# Patient Record
Sex: MALE | Race: BLACK OR AFRICAN AMERICAN | Employment: UNEMPLOYED | URBAN - METROPOLITAN AREA
[De-identification: names, ages, dates, MRNs, and addresses within clinical notes are randomized per-mention and may not be internally consistent; named-entity substitution may affect disease eponyms.]

---

## 2017-12-15 ENCOUNTER — HOSPITAL ENCOUNTER (EMERGENCY)
Age: 32
Discharge: HOME | End: 2017-12-16
Attending: EMERGENCY MEDICINE
Payer: SELF-PAY

## 2017-12-15 VITALS
SYSTOLIC BLOOD PRESSURE: 121 MMHG | OXYGEN SATURATION: 100 % | RESPIRATION RATE: 16 BRPM | HEIGHT: 71 IN | WEIGHT: 170 LBS | TEMPERATURE: 97.6 F | BODY MASS INDEX: 23.8 KG/M2 | DIASTOLIC BLOOD PRESSURE: 74 MMHG | HEART RATE: 65 BPM
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
## 2017-12-15 DIAGNOSIS — M54.2 NECK PAIN: ICD-10-CM

## 2017-12-15 PROCEDURE — 99283 EMERGENCY DEPT VISIT LOW MDM: CPT

## 2017-12-16 PROCEDURE — 74011250637 HC RX REV CODE- 250/637: Performed by: EMERGENCY MEDICINE

## 2017-12-16 RX ORDER — DIAZEPAM 5 MG/1
5-10 TABLET ORAL
Qty: 15 TAB | Refills: 0 | Status: SHIPPED | OUTPATIENT
Start: 2017-12-16 | End: 2019-03-11 | Stop reason: ALTCHOICE

## 2017-12-16 RX ORDER — IBUPROFEN 600 MG/1
600 TABLET ORAL
Qty: 30 TAB | Refills: 0 | Status: SHIPPED | OUTPATIENT
Start: 2017-12-16 | End: 2019-03-11 | Stop reason: ALTCHOICE

## 2017-12-16 RX ORDER — IBUPROFEN 600 MG/1
600 TABLET ORAL
Status: COMPLETED | OUTPATIENT
Start: 2017-12-16 | End: 2017-12-16

## 2017-12-16 RX ADMIN — IBUPROFEN 600 MG: 600 TABLET, FILM COATED ORAL at 00:43
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
## 2019-03-11 ENCOUNTER — APPOINTMENT (OUTPATIENT)
Dept: CT IMAGING | Age: 34
DRG: 043 | End: 2019-03-11
Attending: EMERGENCY MEDICINE
Payer: MEDICAID

## 2019-03-11 ENCOUNTER — HOSPITAL ENCOUNTER (INPATIENT)
Age: 34
LOS: 5 days | Discharge: HOME HEALTH | DRG: 043 | End: 2019-03-16
Attending: EMERGENCY MEDICINE | Admitting: FAMILY MEDICINE
Payer: MEDICAID

## 2019-03-11 DIAGNOSIS — R27.0 ATAXIA: Primary | ICD-10-CM

## 2019-03-11 PROBLEM — G45.9 TIA (TRANSIENT ISCHEMIC ATTACK): Status: ACTIVE | Noted: 2019-03-11

## 2019-03-11 LAB
ALBUMIN SERPL-MCNC: 4.3 G/DL (ref 3.4–5)
ALBUMIN/GLOB SERPL: 1.4 {RATIO} (ref 0.8–1.7)
ALP SERPL-CCNC: 58 U/L (ref 45–117)
ALT SERPL-CCNC: 30 U/L (ref 16–61)
ANION GAP SERPL CALC-SCNC: 3 MMOL/L (ref 3–18)
AST SERPL-CCNC: 13 U/L (ref 15–37)
BASOPHILS # BLD: 0 K/UL (ref 0–0.1)
BASOPHILS NFR BLD: 0 % (ref 0–2)
BILIRUB SERPL-MCNC: 0.5 MG/DL (ref 0.2–1)
BUN SERPL-MCNC: 13 MG/DL (ref 7–18)
BUN/CREAT SERPL: 13 (ref 12–20)
CALCIUM SERPL-MCNC: 9 MG/DL (ref 8.5–10.1)
CHLORIDE SERPL-SCNC: 105 MMOL/L (ref 100–108)
CO2 SERPL-SCNC: 32 MMOL/L (ref 21–32)
CREAT SERPL-MCNC: 1.02 MG/DL (ref 0.6–1.3)
DIFFERENTIAL METHOD BLD: ABNORMAL
EOSINOPHIL # BLD: 0 K/UL (ref 0–0.4)
EOSINOPHIL NFR BLD: 0 % (ref 0–5)
ERYTHROCYTE [DISTWIDTH] IN BLOOD BY AUTOMATED COUNT: 13.5 % (ref 11.6–14.5)
GLOBULIN SER CALC-MCNC: 3.1 G/DL (ref 2–4)
GLUCOSE SERPL-MCNC: 87 MG/DL (ref 74–99)
HCT VFR BLD AUTO: 38.4 % (ref 36–48)
HGB BLD-MCNC: 12.8 G/DL (ref 13–16)
LYMPHOCYTES # BLD: 0.9 K/UL (ref 0.9–3.6)
LYMPHOCYTES NFR BLD: 14 % (ref 21–52)
MAGNESIUM SERPL-MCNC: 2.4 MG/DL (ref 1.6–2.6)
MCH RBC QN AUTO: 30.5 PG (ref 24–34)
MCHC RBC AUTO-ENTMCNC: 33.3 G/DL (ref 31–37)
MCV RBC AUTO: 91.4 FL (ref 74–97)
MONOCYTES # BLD: 0.6 K/UL (ref 0.05–1.2)
MONOCYTES NFR BLD: 9 % (ref 3–10)
NEUTS SEG # BLD: 5.3 K/UL (ref 1.8–8)
NEUTS SEG NFR BLD: 77 % (ref 40–73)
PLATELET # BLD AUTO: 223 K/UL (ref 135–420)
PMV BLD AUTO: 9.4 FL (ref 9.2–11.8)
POTASSIUM SERPL-SCNC: 4.4 MMOL/L (ref 3.5–5.5)
PROT SERPL-MCNC: 7.4 G/DL (ref 6.4–8.2)
RBC # BLD AUTO: 4.2 M/UL (ref 4.7–5.5)
SODIUM SERPL-SCNC: 140 MMOL/L (ref 136–145)
TSH SERPL DL<=0.05 MIU/L-ACNC: 1.2 UIU/ML (ref 0.36–3.74)
VIT B12 SERPL-MCNC: 450 PG/ML (ref 211–911)
WBC # BLD AUTO: 6.9 K/UL (ref 4.6–13.2)

## 2019-03-11 PROCEDURE — 83036 HEMOGLOBIN GLYCOSYLATED A1C: CPT

## 2019-03-11 PROCEDURE — 93005 ELECTROCARDIOGRAM TRACING: CPT

## 2019-03-11 PROCEDURE — 74011250636 HC RX REV CODE- 250/636: Performed by: EMERGENCY MEDICINE

## 2019-03-11 PROCEDURE — 84481 FREE ASSAY (FT-3): CPT

## 2019-03-11 PROCEDURE — 84439 ASSAY OF FREE THYROXINE: CPT

## 2019-03-11 PROCEDURE — 99285 EMERGENCY DEPT VISIT HI MDM: CPT

## 2019-03-11 PROCEDURE — 80053 COMPREHEN METABOLIC PANEL: CPT

## 2019-03-11 PROCEDURE — 96360 HYDRATION IV INFUSION INIT: CPT

## 2019-03-11 PROCEDURE — 70450 CT HEAD/BRAIN W/O DYE: CPT

## 2019-03-11 PROCEDURE — 86038 ANTINUCLEAR ANTIBODIES: CPT

## 2019-03-11 PROCEDURE — 83735 ASSAY OF MAGNESIUM: CPT

## 2019-03-11 PROCEDURE — 84443 ASSAY THYROID STIM HORMONE: CPT

## 2019-03-11 PROCEDURE — 80061 LIPID PANEL: CPT

## 2019-03-11 PROCEDURE — 86141 C-REACTIVE PROTEIN HS: CPT

## 2019-03-11 PROCEDURE — 65270000029 HC RM PRIVATE

## 2019-03-11 PROCEDURE — 82607 VITAMIN B-12: CPT

## 2019-03-11 PROCEDURE — 85652 RBC SED RATE AUTOMATED: CPT

## 2019-03-11 PROCEDURE — 85025 COMPLETE CBC W/AUTO DIFF WBC: CPT

## 2019-03-11 RX ADMIN — SODIUM CHLORIDE 1000 ML: 900 INJECTION, SOLUTION INTRAVENOUS at 18:42

## 2019-03-11 NOTE — ED TRIAGE NOTES
Patient states he has had dizziness and lightheadedness for the past week, was seen at another hospital and given medication but has had no relief from the dizziness

## 2019-03-11 NOTE — ED PROVIDER NOTES
Soraida Gonzalez is a 26 y.o. Male with c/o continued intermittent dizziness, lightheadedness and occ off balance sensation for last 2-3 weeks. No syncope, head injury, cp, sob, edema, headaches, tinnitus, diarrhea, blood in stool. Was seen at clinic locally and rx meds which he cannot remember and states they are not working. No fcs. Sx worse with standing after sitting. Denies alcohol drugs, prior cardiac issues or other sig medical issues      The history is provided by the patient and medical records. History reviewed. No pertinent past medical history. History reviewed. No pertinent surgical history. History reviewed. No pertinent family history. Social History     Socioeconomic History    Marital status: SINGLE     Spouse name: Not on file    Number of children: Not on file    Years of education: Not on file    Highest education level: Not on file   Social Needs    Financial resource strain: Not on file    Food insecurity - worry: Not on file    Food insecurity - inability: Not on file    Transportation needs - medical: Not on file   [place] needs - non-medical: Not on file   Occupational History    Not on file   Tobacco Use    Smoking status: Never Smoker    Smokeless tobacco: Never Used   Substance and Sexual Activity    Alcohol use: Yes     Comment: occ    Drug use: No    Sexual activity: Not on file   Other Topics Concern    Not on file   Social History Narrative    Not on file         ALLERGIES: Patient has no known allergies. Review of Systems   Constitutional: Negative for activity change, appetite change, diaphoresis and fever. HENT: Negative for ear pain, sinus pressure, sore throat and tinnitus. Eyes: Negative for visual disturbance. Respiratory: Negative for shortness of breath. Cardiovascular: Negative for chest pain and palpitations. Gastrointestinal: Negative for abdominal pain. Endocrine: Negative for polyuria.    Genitourinary: Negative for difficulty urinating. Musculoskeletal: Negative for gait problem. Skin: Negative for rash. Allergic/Immunologic: Negative for immunocompromised state. Neurological: Positive for dizziness and light-headedness. Negative for seizures, syncope, speech difficulty, weakness and headaches. Psychiatric/Behavioral: Negative for sleep disturbance. Vitals:    03/11/19 1837   BP: 132/82   Pulse: 80   Resp: 16   Temp: 99.2 °F (37.3 °C)   SpO2: 100%   Weight: 65.8 kg (145 lb)   Height: 5' 7\" (1.702 m)            Physical Exam   Constitutional: He is oriented to person, place, and time. Non-toxic appearance. He does not have a sickly appearance. He does not appear ill. No distress. HENT:   Head: Normocephalic and atraumatic. Right Ear: External ear normal.   Left Ear: External ear normal.   Nose: Nose normal.   Mouth/Throat: Oropharynx is clear and moist. No oropharyngeal exudate. Eyes: Conjunctivae and EOM are normal. Pupils are equal, round, and reactive to light. No scleral icterus. Neck: Normal range of motion. Cardiovascular: Normal rate, regular rhythm, normal heart sounds and intact distal pulses. Pulmonary/Chest: Effort normal and breath sounds normal. No respiratory distress. Abdominal: Soft. There is no tenderness. Musculoskeletal: Normal range of motion. He exhibits no edema. Neurological: He is alert and oriented to person, place, and time. He displays no tremor. No cranial nerve deficit (3-12) or sensory deficit. He exhibits normal muscle tone. He displays no seizure activity. Coordination and gait abnormal.   Skin: Skin is warm and dry. Capillary refill takes less than 2 seconds. He is not diaphoretic. Psychiatric: His behavior is normal.   Nursing note and vitals reviewed.        [facility]       Procedures    Vitals:  Patient Vitals for the past 12 hrs:   Temp Pulse Resp BP SpO2   03/11/19 1837 99.2 °F (37.3 °C) 80 16 132/82 100 %         Medications ordered:   Medications   sodium chloride 0.9 % bolus infusion 1,000 mL (1,000 mL IntraVENous New Bag 3/11/19 2370)         Lab findings:  Recent Results (from the past 12 hour(s))   EKG, 12 LEAD, INITIAL    Collection Time: 03/11/19  7:36 PM   Result Value Ref Range    Ventricular Rate 69 BPM    Atrial Rate 69 BPM    P-R Interval 164 ms    QRS Duration 94 ms    Q-T Interval 356 ms    QTC Calculation (Bezet) 381 ms    Calculated P Axis 56 degrees    Calculated R Axis 74 degrees    Calculated T Axis 55 degrees    Diagnosis       Normal sinus rhythm  Early repolarization  Normal ECG  No previous ECGs available     METABOLIC PANEL, COMPREHENSIVE    Collection Time: 03/11/19  7:46 PM   Result Value Ref Range    Sodium 140 136 - 145 mmol/L    Potassium 4.4 3.5 - 5.5 mmol/L    Chloride 105 100 - 108 mmol/L    CO2 32 21 - 32 mmol/L    Anion gap 3 3.0 - 18 mmol/L    Glucose 87 74 - 99 mg/dL    BUN 13 7.0 - 18 MG/DL    Creatinine 1.02 0.6 - 1.3 MG/DL    BUN/Creatinine ratio 13 12 - 20      GFR est AA >60 >60 ml/min/1.73m2    GFR est non-AA >60 >60 ml/min/1.73m2    Calcium 9.0 8.5 - 10.1 MG/DL    Bilirubin, total 0.5 0.2 - 1.0 MG/DL    ALT (SGPT) 30 16 - 61 U/L    AST (SGOT) 13 (L) 15 - 37 U/L    Alk. phosphatase 58 45 - 117 U/L    Protein, total 7.4 6.4 - 8.2 g/dL    Albumin 4.3 3.4 - 5.0 g/dL    Globulin 3.1 2.0 - 4.0 g/dL    A-G Ratio 1.4 0.8 - 1.7     CBC WITH AUTOMATED DIFF    Collection Time: 03/11/19  7:46 PM   Result Value Ref Range    WBC 6.9 4.6 - 13.2 K/uL    RBC 4.20 (L) 4.70 - 5.50 M/uL    HGB 12.8 (L) 13.0 - 16.0 g/dL    HCT 38.4 36.0 - 48.0 %    MCV 91.4 74.0 - 97.0 FL    MCH 30.5 24.0 - 34.0 PG    MCHC 33.3 31.0 - 37.0 g/dL    RDW 13.5 11.6 - 14.5 %    PLATELET 853 109 - 638 K/uL    MPV 9.4 9.2 - 11.8 FL    NEUTROPHILS 77 (H) 40 - 73 %    LYMPHOCYTES 14 (L) 21 - 52 %    MONOCYTES 9 3 - 10 %    EOSINOPHILS 0 0 - 5 %    BASOPHILS 0 0 - 2 %    ABS. NEUTROPHILS 5.3 1.8 - 8.0 K/UL    ABS. LYMPHOCYTES 0.9 0.9 - 3.6 K/UL    ABS.  MONOCYTES 0.6 0.05 - 1.2 K/UL    ABS. EOSINOPHILS 0.0 0.0 - 0.4 K/UL    ABS. BASOPHILS 0.0 0.0 - 0.1 K/UL    DF AUTOMATED     MAGNESIUM    Collection Time: 03/11/19  7:46 PM   Result Value Ref Range    Magnesium 2.4 1.6 - 2.6 mg/dL       EKG interpretation by ED Physician:  Nsr. Early repol. No acute st tw changes indicative of ischemia  Rate 69, pr 164, qtc 381  No previous      X-Ray, CT or other radiology findings or impressions:  CT HEAD WO CONT    (Results Pending)   Preliminary  read as no acute intracranial abnormality. There are multiple areas of encephalomalacia in the corona radiata of the right frontal and left frontal parietal lobes likely chronic infarcts. No prior available for comparison. Progress notes, Consult notes or additional Procedure notes:   Seen by tele-neurologist.  Patient is very ataxic upon exam.  He recommends admission with MRI in the morning along with a B12 TSH and MOO levels drawn. I have discussed with the patient and his significant other at bedside regarding need for admission and is agreeable  I have consulted Dr. Troy Mora who is on-call for hospitalist and will admit. Reevaluation of patient:   Stable       Disposition:  Diagnosis:   1.  Ataxia        Disposition: admit    Follow-up Information    None              Medication List      You have not been prescribed any medications.     '

## 2019-03-12 ENCOUNTER — APPOINTMENT (OUTPATIENT)
Dept: MRI IMAGING | Age: 34
DRG: 043 | End: 2019-03-12
Attending: FAMILY MEDICINE
Payer: MEDICAID

## 2019-03-12 ENCOUNTER — APPOINTMENT (OUTPATIENT)
Dept: VASCULAR SURGERY | Age: 34
DRG: 043 | End: 2019-03-12
Attending: NURSE PRACTITIONER
Payer: MEDICAID

## 2019-03-12 ENCOUNTER — APPOINTMENT (OUTPATIENT)
Dept: MRI IMAGING | Age: 34
DRG: 043 | End: 2019-03-12
Attending: NURSE PRACTITIONER
Payer: MEDICAID

## 2019-03-12 ENCOUNTER — APPOINTMENT (OUTPATIENT)
Dept: NON INVASIVE DIAGNOSTICS | Age: 34
DRG: 043 | End: 2019-03-12
Attending: FAMILY MEDICINE
Payer: MEDICAID

## 2019-03-12 PROBLEM — G37.9 DEMYELINATING DISEASE OF CENTRAL NERVOUS SYSTEM (HCC): Status: ACTIVE | Noted: 2019-03-12

## 2019-03-12 LAB
APTT PPP: 27.8 SEC (ref 23–36.4)
ATRIAL RATE: 69 BPM
CALCULATED P AXIS, ECG09: 56 DEGREES
CALCULATED R AXIS, ECG10: 74 DEGREES
CALCULATED T AXIS, ECG11: 55 DEGREES
CHOLEST SERPL-MCNC: 167 MG/DL
DIAGNOSIS, 93000: NORMAL
ERYTHROCYTE [SEDIMENTATION RATE] IN BLOOD: 1 MM/HR (ref 0–15)
EST. AVERAGE GLUCOSE BLD GHB EST-MCNC: NORMAL MG/DL
GLUCOSE BLD STRIP.AUTO-MCNC: 118 MG/DL (ref 70–110)
GLUCOSE BLD STRIP.AUTO-MCNC: 140 MG/DL (ref 70–110)
GLUCOSE BLD STRIP.AUTO-MCNC: 61 MG/DL (ref 70–110)
GLUCOSE BLD STRIP.AUTO-MCNC: 64 MG/DL (ref 70–110)
GLUCOSE BLD STRIP.AUTO-MCNC: 66 MG/DL (ref 70–110)
GLUCOSE BLD STRIP.AUTO-MCNC: 74 MG/DL (ref 70–110)
GLUCOSE BLD STRIP.AUTO-MCNC: 92 MG/DL (ref 70–110)
GLUCOSE BLD STRIP.AUTO-MCNC: 97 MG/DL (ref 70–110)
HBA1C MFR BLD: 4.5 % (ref 4.2–5.6)
HDLC SERPL-MCNC: 56 MG/DL (ref 40–60)
HDLC SERPL: 3 {RATIO} (ref 0–5)
INR PPP: 1 (ref 0.8–1.2)
LDLC SERPL CALC-MCNC: 99 MG/DL (ref 0–100)
LEFT CCA DIST DIAS: 24.6 CM/S
LEFT CCA DIST SYS: 123.3 CM/S
LEFT CCA MID DIAS: 21.2 CM/S
LEFT CCA MID SYS: 140.3 CM/S
LEFT CCA PROX DIAS: 23.8 CM/S
LEFT CCA PROX SYS: 153.2 CM/S
LEFT ECA DIAS: 12.5 CM/S
LEFT ECA SYS: 93.6 CM/S
LEFT ICA DIST DIAS: 35.1 CM/S
LEFT ICA DIST SYS: 89.9 CM/S
LEFT ICA MID DIAS: 27.8 CM/S
LEFT ICA MID SYS: 86.2 CM/S
LEFT ICA PROX DIAS: 20.5 CM/S
LEFT ICA PROX SYS: 82.6 CM/S
LEFT ICA/CCA SYS: 0.6
LEFT SUBCLAVIAN DIAS: 0 CM/S
LEFT SUBCLAVIAN SYS: 153.3 CM/S
LEFT VERTEBRAL DIAS: 17.4 CM/S
LEFT VERTEBRAL SYS: 67.8 CM/S
LIPID PROFILE,FLP: NORMAL
P-R INTERVAL, ECG05: 164 MS
PROTHROMBIN TIME: 12.5 SEC (ref 11.5–15.2)
Q-T INTERVAL, ECG07: 356 MS
QRS DURATION, ECG06: 94 MS
QTC CALCULATION (BEZET), ECG08: 381 MS
RIGHT CCA DIST DIAS: 19.5 CM/S
RIGHT CCA DIST SYS: 135.8 CM/S
RIGHT CCA MID DIAS: 17.3 CM/S
RIGHT CCA MID SYS: 116.1 CM/S
RIGHT CCA PROX DIAS: 26.1 CM/S
RIGHT CCA PROX SYS: 168.8 CM/S
RIGHT ECA DIAS: 16.3 CM/S
RIGHT ECA SYS: 114.6 CM/S
RIGHT ICA DIST DIAS: 26.5 CM/S
RIGHT ICA DIST SYS: 76.9 CM/S
RIGHT ICA MID DIAS: 26.5 CM/S
RIGHT ICA MID SYS: 92.8 CM/S
RIGHT ICA PROX DIAS: 21.6 CM/S
RIGHT ICA PROX SYS: 81.8 CM/S
RIGHT ICA/CCA SYS: 0.6
RIGHT SUBCLAVIAN DIAS: 0 CM/S
RIGHT SUBCLAVIAN SYS: 143 CM/S
RIGHT VERTEBRAL DIAS: 13.4 CM/S
RIGHT VERTEBRAL SYS: 66.2 CM/S
T3FREE SERPL-MCNC: 2.4 PG/ML (ref 2.18–3.98)
T4 FREE SERPL-MCNC: 0.8 NG/DL (ref 0.7–1.5)
TRIGL SERPL-MCNC: 60 MG/DL (ref ?–150)
TSH SERPL DL<=0.05 MIU/L-ACNC: 0.91 UIU/ML (ref 0.36–3.74)
VENTRICULAR RATE, ECG03: 69 BPM
VLDLC SERPL CALC-MCNC: 12 MG/DL

## 2019-03-12 PROCEDURE — 85610 PROTHROMBIN TIME: CPT

## 2019-03-12 PROCEDURE — 97116 GAIT TRAINING THERAPY: CPT

## 2019-03-12 PROCEDURE — 82164 ANGIOTENSIN I ENZYME TEST: CPT

## 2019-03-12 PROCEDURE — 65660000000 HC RM CCU STEPDOWN

## 2019-03-12 PROCEDURE — 97165 OT EVAL LOW COMPLEX 30 MIN: CPT

## 2019-03-12 PROCEDURE — 86235 NUCLEAR ANTIGEN ANTIBODY: CPT

## 2019-03-12 PROCEDURE — 97162 PT EVAL MOD COMPLEX 30 MIN: CPT

## 2019-03-12 PROCEDURE — 86618 LYME DISEASE ANTIBODY: CPT

## 2019-03-12 PROCEDURE — 86780 TREPONEMA PALLIDUM: CPT

## 2019-03-12 PROCEDURE — 87389 HIV-1 AG W/HIV-1&-2 AB AG IA: CPT

## 2019-03-12 PROCEDURE — 77030032490 HC SLV COMPR SCD KNE COVD -B

## 2019-03-12 PROCEDURE — 72156 MRI NECK SPINE W/O & W/DYE: CPT

## 2019-03-12 PROCEDURE — 74011000258 HC RX REV CODE- 258: Performed by: HOSPITALIST

## 2019-03-12 PROCEDURE — 74011250636 HC RX REV CODE- 250/636: Performed by: FAMILY MEDICINE

## 2019-03-12 PROCEDURE — 84425 ASSAY OF VITAMIN B-1: CPT

## 2019-03-12 PROCEDURE — 74011000250 HC RX REV CODE- 250: Performed by: FAMILY MEDICINE

## 2019-03-12 PROCEDURE — 74011000258 HC RX REV CODE- 258: Performed by: FAMILY MEDICINE

## 2019-03-12 PROCEDURE — 74011636320 HC RX REV CODE- 636/320: Performed by: HOSPITALIST

## 2019-03-12 PROCEDURE — 82962 GLUCOSE BLOOD TEST: CPT

## 2019-03-12 PROCEDURE — A9575 INJ GADOTERATE MEGLUMI 0.1ML: HCPCS | Performed by: HOSPITALIST

## 2019-03-12 PROCEDURE — 92610 EVALUATE SWALLOWING FUNCTION: CPT

## 2019-03-12 PROCEDURE — 74011250636 HC RX REV CODE- 250/636: Performed by: PSYCHIATRY & NEUROLOGY

## 2019-03-12 PROCEDURE — 93880 EXTRACRANIAL BILAT STUDY: CPT

## 2019-03-12 PROCEDURE — 82525 ASSAY OF COPPER: CPT

## 2019-03-12 PROCEDURE — 36415 COLL VENOUS BLD VENIPUNCTURE: CPT

## 2019-03-12 PROCEDURE — 74011250637 HC RX REV CODE- 250/637: Performed by: FAMILY MEDICINE

## 2019-03-12 PROCEDURE — 70553 MRI BRAIN STEM W/O & W/DYE: CPT

## 2019-03-12 PROCEDURE — 82175 ASSAY OF ARSENIC: CPT

## 2019-03-12 PROCEDURE — 85730 THROMBOPLASTIN TIME PARTIAL: CPT

## 2019-03-12 PROCEDURE — 72157 MRI CHEST SPINE W/O & W/DYE: CPT

## 2019-03-12 RX ORDER — AMOXICILLIN 250 MG
2 CAPSULE ORAL
Status: DISCONTINUED | OUTPATIENT
Start: 2019-03-12 | End: 2019-03-16 | Stop reason: HOSPADM

## 2019-03-12 RX ORDER — PANTOPRAZOLE SODIUM 40 MG/1
40 TABLET, DELAYED RELEASE ORAL EVERY 12 HOURS
Status: DISCONTINUED | OUTPATIENT
Start: 2019-03-12 | End: 2019-03-12

## 2019-03-12 RX ORDER — DEXTROSE MONOHYDRATE AND SODIUM CHLORIDE 5; .9 G/100ML; G/100ML
0.75 INJECTION, SOLUTION INTRAVENOUS CONTINUOUS
Status: DISCONTINUED | OUTPATIENT
Start: 2019-03-12 | End: 2019-03-14

## 2019-03-12 RX ORDER — ACETAMINOPHEN 650 MG/1
650 SUPPOSITORY RECTAL
Status: DISCONTINUED | OUTPATIENT
Start: 2019-03-12 | End: 2019-03-16 | Stop reason: HOSPADM

## 2019-03-12 RX ORDER — ATORVASTATIN CALCIUM 40 MG/1
80 TABLET, FILM COATED ORAL
Status: DISCONTINUED | OUTPATIENT
Start: 2019-03-12 | End: 2019-03-13

## 2019-03-12 RX ORDER — ALBUTEROL SULFATE 0.83 MG/ML
2.5 SOLUTION RESPIRATORY (INHALATION)
Status: DISCONTINUED | OUTPATIENT
Start: 2019-03-12 | End: 2019-03-16 | Stop reason: HOSPADM

## 2019-03-12 RX ORDER — PANTOPRAZOLE SODIUM 40 MG/1
40 GRANULE, DELAYED RELEASE ORAL EVERY 12 HOURS
Status: DISCONTINUED | OUTPATIENT
Start: 2019-03-12 | End: 2019-03-12

## 2019-03-12 RX ORDER — ONDANSETRON 2 MG/ML
2 INJECTION INTRAMUSCULAR; INTRAVENOUS
Status: DISCONTINUED | OUTPATIENT
Start: 2019-03-12 | End: 2019-03-16 | Stop reason: HOSPADM

## 2019-03-12 RX ORDER — DEXTROSE MONOHYDRATE AND SODIUM CHLORIDE 5; .45 G/100ML; G/100ML
75 INJECTION, SOLUTION INTRAVENOUS CONTINUOUS
Status: DISCONTINUED | OUTPATIENT
Start: 2019-03-12 | End: 2019-03-16

## 2019-03-12 RX ORDER — ASPIRIN 325 MG
325 TABLET ORAL DAILY
Status: DISCONTINUED | OUTPATIENT
Start: 2019-03-12 | End: 2019-03-13

## 2019-03-12 RX ORDER — ACETAMINOPHEN 325 MG/1
650 TABLET ORAL
Status: DISCONTINUED | OUTPATIENT
Start: 2019-03-12 | End: 2019-03-16 | Stop reason: HOSPADM

## 2019-03-12 RX ORDER — PANTOPRAZOLE SODIUM 40 MG/1
40 TABLET, DELAYED RELEASE ORAL EVERY 12 HOURS
Status: DISCONTINUED | OUTPATIENT
Start: 2019-03-12 | End: 2019-03-16 | Stop reason: HOSPADM

## 2019-03-12 RX ORDER — ONDANSETRON 2 MG/ML
4 INJECTION INTRAMUSCULAR; INTRAVENOUS
Status: DISCONTINUED | OUTPATIENT
Start: 2019-03-12 | End: 2019-03-12

## 2019-03-12 RX ADMIN — ASPIRIN 325 MG ORAL TABLET 325 MG: 325 PILL ORAL at 09:16

## 2019-03-12 RX ADMIN — FOLIC ACID: 5 INJECTION, SOLUTION INTRAMUSCULAR; INTRAVENOUS; SUBCUTANEOUS at 17:39

## 2019-03-12 RX ADMIN — PANTOPRAZOLE SODIUM 40 MG: 40 TABLET, DELAYED RELEASE ORAL at 09:16

## 2019-03-12 RX ADMIN — ATORVASTATIN CALCIUM 80 MG: 40 TABLET, FILM COATED ORAL at 22:15

## 2019-03-12 RX ADMIN — PANTOPRAZOLE SODIUM 40 MG: 40 TABLET, DELAYED RELEASE ORAL at 01:32

## 2019-03-12 RX ADMIN — DEXTROSE MONOHYDRATE AND SODIUM CHLORIDE 75 ML/HR: 5; .45 INJECTION, SOLUTION INTRAVENOUS at 09:06

## 2019-03-12 RX ADMIN — PANTOPRAZOLE SODIUM 40 MG: 40 TABLET, DELAYED RELEASE ORAL at 22:16

## 2019-03-12 RX ADMIN — GADOTERATE MEGLUMINE 15 ML: 376.9 INJECTION INTRAVENOUS at 16:35

## 2019-03-12 RX ADMIN — DEXTROSE MONOHYDRATE AND SODIUM CHLORIDE 0.75 ML/KG/HR: 5; .9 INJECTION, SOLUTION INTRAVENOUS at 01:30

## 2019-03-12 RX ADMIN — SODIUM CHLORIDE 1000 MG: 900 INJECTION, SOLUTION INTRAVENOUS at 22:59

## 2019-03-12 RX ADMIN — SENNOSIDES AND DOCUSATE SODIUM 2 TABLET: 8.6; 5 TABLET ORAL at 01:30

## 2019-03-12 RX ADMIN — ATORVASTATIN CALCIUM 80 MG: 40 TABLET, FILM COATED ORAL at 01:30

## 2019-03-12 NOTE — PROGRESS NOTES
Problem: Falls - Risk of  Goal: *Absence of Falls  Document Basim Fall Risk and appropriate interventions in the flowsheet.   Outcome: Progressing Towards Goal  Fall Risk Interventions:                      History of Falls Interventions: Room close to nurse's station, Investigate reason for fall, Door open when patient unattended

## 2019-03-12 NOTE — ROUTINE PROCESS
Assume care of patient from [address]. Patient received in bed awake. Patient A&Ox4, denies pain and discomfort. No distress noted. Wound care completed on sacrum. Ileostomy emptied. Incontinent care completed. Prevolon boots present bilateral. SCD's in place bilateral. Was informed that patient will be transferred to SNF tomorrow. Frequently use items within reach. Bed locked in low position. Call bell within reach and patient verbalized understanding of use for assistance and needs.

## 2019-03-12 NOTE — PROGRESS NOTES
Problem: Dysphagia (Adult)  Goal: *Acute Goals and Plan of Care (Insert Text)  Patient will:  1. Participate in training and education related to continued aspiration risk, diet recs and compensatory strategies (goal met). Outcome: Resolved/Met Date Met: 03/12/19  Speech LAnguage Pathology bedside swallow evaluation AND DISCHARGE    Patient: Soraida Gonzalez (1 y.o. male)  Date: 3/12/2019  Primary Diagnosis: TIA (transient ischemic attack) [G45.9]  Ataxia [R27.0]  Ataxia [R27.0]  TIA (transient ischemic attack) [G45.9]       Precautions: N/A     PLOF: Independent  ASSESSMENT :  Clinical beside swallow eval completed per MD orders. Pt A&Ox4. Functional communication. Intelligibility >90%. Cognitive-linguistic function appears intact. OM examination revealed oral motor structures functional for mastication and deglutition. Presented with thin liquid, puree, and solid trials. Exhibited adequate bolus cohesion, manipulation and A-P transit. Further exhibited adequate swallow timing/reflex and hyolaryngeal excursion. Pt able to manipulate and clear with 0 clinical s/s aspiration and/or oropharyngeal dysphagia. Pt safe for regular solid, thin liquid diet. 0 formal ST needs for dysphagia indicated at this time. SLP educated pt on role of speech therapist in current setting with re: speech/swallow; verbalized comprehension. SLP available for re-evaluation if indicated by MD. Results d/w RN, Sameer Tim. Thank you for this referral.   Sky Anaya, SLP     PLAN :  Recommendations and Planned Interventions:  No formal ST needs ID'd for dysphagia. Eval only. Discharge Recommendations: None for ST     SUBJECTIVE:   Patient stated OK. OBJECTIVE:   History reviewed. No pertinent past medical history. History reviewed. No pertinent surgical history.   Prior Level of Function/Home Situation: Independent  Home Situation  Home Environment: Apartment  # Steps to Enter: 0  One/Two Story Residence: One story  Living Alone: No  Support Systems: Friends \ neighbors  Patient Expects to be Discharged to[de-identified] Apartment  Current DME Used/Available at Home: None  Diet prior to admission: regular/thin  Current Diet:  Regular/thin   Cognitive and Communication Status:  Neurologic State: Alert  Orientation Level: Oriented X4  Cognition: Follows commands  Perception: Appears intact  Perseveration: No perseveration noted  Safety/Judgement: Awareness of environment  Oral Assessment:  Oral Assessment  Labial: No impairment  Dentition: Limited;Natural  Oral Hygiene: good  Lingual: No impairment  Velum: No impairment  Mandible: No impairment  P.O. Trials:  Patient Position: [facility] 80  Vocal quality prior to P.O.: No impairment  Consistency Presented: Thin liquid; Solid  How Presented: Self-fed/presented; Successive swallows;Straw     Bolus Acceptance: No impairment  Bolus Formation/Control: No impairment     Propulsion: No impairment  Oral Residue: None  Initiation of Swallow: No impairment  Laryngeal Elevation: Functional  Aspiration Signs/Symptoms: None  Pharyngeal Phase Characteristics: No impairment, issues, or problems   Effective Modifications: None  Cues for Modifications: None       Oral Phase Severity: No impairment  Pharyngeal Phase Severity : No impairment    The severity rating is based on the following outcomes:  PRETTY Noms Swallow Level 7        PAIN:  Start of Eval: 0  End of Eval: 0     After evaluation:   []            Patient left in no apparent distress sitting up in chair  [x]            Patient left in no apparent distress in bed  [x]            Call bell left within reach  [x]            Nursing notified  []            Family present  []            Caregiver present  []            Bed alarm activated      COMMUNICATION/EDUCATION:   [x]            Aspiration precautions; swallow safety; compensatory techniques. []            Patient/family have participated as able in goal setting and plan of care.   []            Patient/family agree to work toward stated goals and plan of care. []            Patient understands intent and goals of therapy; neutral about participation. []            Patient unable to participate in goal setting/plan of care; educ ongoing with interdisciplinary staff  []         Posted safety precautions in patient's room.     Thank you for this referral.  Julienne Isidro, SLP  Time Calculation: 15 mins

## 2019-03-12 NOTE — ROUTINE PROCESS
Assume care of patient from [facility]. Patient received in bed awake with significant other at bedside. Patient alert and orientated to person, place, and situation but not time, denies pain and discomfort. No distress noted. Frequently use items within reach. Bed locked in low position. Call bell within reach and patient verbalized understanding of use for assistance and needs. Dual NIH completed.

## 2019-03-12 NOTE — PROGRESS NOTES
Problem: Mobility Impaired (Adult and Pediatric)  Goal: *Acute Goals and Plan of Care (Insert Text)  Physical Therapy Goals   Initiated 3/12/2019 and to be accomplished within 7 days. 1.  Patient will complete all bed mobility with independence in order to prepare for EOB/OOB activity. 2.  Patient will perform sit <> stand with independence in order to prepare for OOB/gait activity. 3.  Patient will perform bed to chair transfers with independence in order to promote mobility and encourage seated activity to progress towards their prior level of function. 4.  Patient will ambulate 500 feet with independence using LRAD in order to prepare for safe negotiation of their environment. 5.  Patient will ascend/descend 1 steps with S handrail use with independence in order to prepare for safe exit/entry into their home environment. Outcome: Progressing Towards Goal  PHYSICAL THERAPY: Initial Assessment   INPATIENT: Self-pay: Hospital Day: 2     Patient: Quintin Osuna (26 y.o. male)    Date: 3/12/2019  Primary Diagnosis: TIA (transient ischemic attack) [G45.9]  Ataxia [R27.0]  Ataxia [R27.0]  TIA (transient ischemic attack) [G45.9]   ,     Precautions:       PLOF: Independent     ASSESSMENT :  Patient supine in bed, agreeable to participation with PT. Patient reports that he lives with family in a single story apartment with 0 CHRISTOPHER. SBA for supine > sit. Good seated balance. B LE strength 5/5. Patient presents with dysdiadochokinesia. Sensation intact to L touch, denies any numbness/tingling in B feet. Supervision for sit > stand. Good static seated balance/fair dynamic balance. Close supervision for ambulation x 220 ft. Patient ambulated with decreased arm swing on R, asymmetrical swing on R, increased trunk sway to R, path deviations, and decreased trunk rotation. Patient returned to room and left supine with all needs within reach. No c/o pain. Recommend d/c home with home health or outpatient.       Patient presents with deficits in:  Bed Mobility, Transfers, Gait and Balance    Patient will benefit from skilled intervention to address the above impairments. Patients rehabilitation potential is considered to be Good  Factors which may influence rehabilitation potential include:   []         None noted  []         Mental ability/status  [x]         Medical condition  []         Home/family situation and support systems  []         Safety awareness  []         Pain tolerance/management  []         Other:      PLAN :   Recommendations and Planned Interventions:  [x]           Bed Mobility Training             [x]    Neuromuscular Re-Education  [x]           Transfer Training                   []    Orthotic/Prosthetic Training  [x]           Gait Training                          []    Modalities  [x]           Therapeutic Exercises          []    Edema Management/Control  [x]           Therapeutic Activities            [x]    Patient and Family Training/Education  []           Other (comment):      EDUCATION:   Education:  Patient was educated on the following topics: Purpose of PT, PT POC, gait training, safety. Verbalizes understanding   Barriers to Learning/Limitations: None  Compensate with: visual, verbal, tactile, kinesthetic cues/model    Recommendations for the next treatment session: Gait and dynamic balance training. Frequency/Duration: Patient will be followed by physical therapy 1-2 times per day/4-7 days per week to address goals. Discharge Recommendations: Home Health  Further Equipment Recommendations for Discharge: N/A  Factors which may impact discharge planning: N/A     SUBJECTIVE:   Patient stated Sometimes my walking is good, sometimes its bad.     OBJECTIVE DATA SUMMARY:   History reviewed. No pertinent past medical history. History reviewed. No pertinent surgical history.       Eval Complexity: History: MEDIUM  Complexity : 1-2 comorbidities / personal factors will impact the outcome/ POC Exam:HIGH Complexity : 4+ Standardized tests and measures addressing body structure, function, activity limitation and / or participation in recreation  Presentation: MEDIUM Complexity : Evolving with changing characteristics  Clinical Decision Making:Medium Complexity Supervision/SBA for bed mobiltiy, transfers, ambulation  Overall Complexity:MEDIUM    Prior Level of Function/Home Situation:   Home Situation  Home Environment: Apartment  # Steps to Enter: 0  One/Two Story Residence: One story  Living Alone: No  Support Systems: Friends \ neighbors  Patient Expects to be Discharged to[de-identified] Apartment  Current DME Used/Available at Home: None  Critical Behavior:  Neurologic State: Alert  Orientation Level: Oriented X4  Cognition: Follows commands     Psychosocial  Patient Behaviors: Calm; Cooperative  Purposeful Interaction: Yes    Manual Muscle Testing (LE)         R     L    Hip Flexion:   525 5/5  Knee EXT:   5/5 5/5  Knee FLEX:   5/5 5/5  Ankle DF:   5/5 5/5  _________________________________________________   Tone : Normal  Sensation: Grossly intact to L touch  Range Of Motion: B LE AROM WFL    Functional Mobility:      Functional Status      Indep   (I)   Mod I   Super-vision   Min A   Mod A   Max A   Total A   Assist x2 Verbal cues Additional time Not tested   Comments   Rolling []  []  [] []    []    []  []  [] [] [] [x]    Supine to sit []  []  [x]  SBA []  []  []  []  [] [] [] []    Sit to supine []  []  [x] []  []  []  []  [] [] [] []    Sit to stand []  []  [x] []  []  []  []  [] [] [] []    Stand to sit []  []  [x] []  []  []  []  [] [] [] []    Bed to chair transfers []  []  [] []  []  []  []  [] [] [] [x]        Balance    Good   Fair   Poor   Unable   Not tested   Comments   Sitting static [x]  []  []  []  []    Sitting dynamic [x]  []  []  []  []    Standing static [x]  []  []  []  []    Standing dynamic []  [x]  []  []  []        Mobility/Gait:   Level of Assistance: Close supervision  Assistive Device: None  Distance Ambulated: 220 feet     Left Lower Extremity: FWB   Right Lower Extremity: FWB  Base of Support: center of gravity altered  Speed/Shahnaz: fluctuations  Step Length: right shortened  Swing Pattern: right asymmetrical  Stance: WFL  Gait Abnormalities: altered arm swing, decreased step clearance, path deviations, step to gait and trunk sway increase, decreased trunk rotation     Pain:  None    Vital Signs  Temp: 97.4 °F (36.3 °C)     Pulse (Heart Rate): 65     BP: 124/75     Resp Rate: 16     O2 Sat (%): 99 %    Activity Tolerance:   Good    Please refer to the flowsheet for vital signs taken during this treatment. After treatment:   []         Patient left in no apparent distress sitting up in chair  [x]         Patient left in no apparent distress in bed  [x]         Call bell left within reach  []         Nursing notified  []         Caregiver present  []         Bed alarm activated    COMMUNICATION/EDUCATION:   [x]         Fall prevention education was provided and the patient/caregiver indicated understanding. [x]         Patient/family have participated as able in goal setting and plan of care. [x]         Patient/family agree to work toward stated goals and plan of care. []         Patient understands intent and goals of therapy, but is neutral about his/her participation. []         Patient is unable to participate in goal setting and plan of care.     Thank you for this referral.  Aneta Kelsey   Time Calculation: 18 mins

## 2019-03-12 NOTE — PROGRESS NOTES
Chart reviewed and pt verified demographics, except he says his phone number is now [phone]. He lives with His girl friend and contact Jonelle Harper [phone]. He has no PCP, appt made for pt with Dr Eusebia Landaverde on 3/25/19 at 0830. He has worked with PT/OT and they recommend Home Health PT/OT evaluation and treatment. Freedom of choice obtained for [facility] and referral placed in electronic chart, and called to home health. Pt uses no DME and was independent with ADL. His girlfriend is to drive him home at [place]. He may require help with any new medication costs if ordered. Reason for Admission:   TIA                   RRAT Score:       4   . Plan for utilizing home health:      Home Health PT/OT                    Likelihood of Readmission:  low                         Transition of Care Plan:      Home with [facility]               Patient out of room for test, I placed PCP appoinment page on bedside table. Marcial Albert

## 2019-03-12 NOTE — H&P
Internal Medicine History and Physical          Subjective     HPI: Darlin Leigh is a 26 y.o. male who presented to the ED with three weeks hx of dizziness that did not respond to apparently meclizine, associated with ataxia. No reported falls. No vertigo. No CV sx. No fever or signs of infection. No apparent weakness in LE. ED evaluation revealed abnormal CT with suspicion for MS. Tele-neurology consulted. Still ataxic. Will admit for further evaluation and treatment      PMHx:  History reviewed. No pertinent past medical history. PSurgHx:  History reviewed. No pertinent surgical history. SocialHx:  Social History     Socioeconomic History    Marital status: SINGLE     Spouse name: Not on file    Number of children: Not on file    Years of education: Not on file    Highest education level: Not on file   Social Needs    Financial resource strain: Not on file    Food insecurity - worry: Not on file    Food insecurity - inability: Not on file    Transportation needs - medical: Not on file   [employer] needs - non-medical: Not on file   Occupational History    Not on file   Tobacco Use    Smoking status: Never Smoker    Smokeless tobacco: Never Used   Substance and Sexual Activity    Alcohol use: Yes     Comment: occ    Drug use: No    Sexual activity: Not on file   Other Topics Concern    Not on file   Social History Narrative    Not on file       Allergies:  No Known Allergies    FamilyHx:  History reviewed. No pertinent family history.     None       Review of Systems:  CONST: fever(-),   chills(-),   fatigue(-),   malaise(-)  SKIN: itching(-),   rash(-)  EYES: vision - no change from baseline  ENT: ear pain(-),   nasal discharge(-),   sore throat(-),   voice change(-)  RESP: SOB(-),   cough(-),   hemoptysis(-)  CV: chest pain(-),   PND(-),   orthopnea(-),   exertional dyspnea(-),   palpitations(-), syncope(-)  GI: abd pain(-),   nausea(-),   vomiting(-),   diarrhea(-), melena(-),   hematemesis(-), hematochesia(-)  : dysuria(-),   frequency(-),   urgency(-),   hematuria(-)  MS: arthralgias(-),   myalgias(-)  NEURO: speech w/o change,   tremors(-),   seizures(-),   numbness(-),   Dizziness(+)    Objective      Visit Vitals  /83 (BP 1 Location: Right arm)   Pulse 62   Temp 98.4 °F (36.9 °C)   Resp 16   Ht 5' 7\" (1.702 m)   Wt 65.8 kg (145 lb)   SpO2 100%   BMI 22.71 kg/m²       Physical Exam:  CONST: NAD,   VSS reviewed  SKIN: rashes(-),   ulcers(-)  EYES: PERRL,   EOMI,   sclerae w/o jaundice  HENT: HEENT,   normal appearing nose and ears,   normal nasal mucosa and turbinates  NECK: supple,   no LA,   trachea is midline,   thyroid w/o goiter or palpable nodules  RESP: normal respiratory effort,   wheezes(-),   rales(-),   rhochi(-),   no consolidation on percussion  CHEST: normal axillae,   retractions(-),   use of accessory muscles(-)  CV: JVD(-),   carotid bruits(-),   RRR,   gallops(-),   murmurs(-),   edema LE(-),   abd bruits(-),   peripheral pulses normal  ABD: soft, tender(-),   distended(-),   HSM(-),   BS(+) in all quadrants,   peritoneal signs(-)  MS: NROM in all extremities,  clubbing(-),   peripheral cyanosis(-)  NEURO: speech normal, tremors(-),   CN II-XII(-),   lateralizing signs(-), ataxia,   PSYCH: AOC x 3,   appropriate affect,   non-suicidal      Laboratory Studies:  CMP:   Lab Results   Component Value Date/Time     03/11/2019 07:46 PM    K 4.4 03/11/2019 07:46 PM     03/11/2019 07:46 PM    CO2 32 03/11/2019 07:46 PM    AGAP 3 03/11/2019 07:46 PM    GLU 87 03/11/2019 07:46 PM    BUN 13 03/11/2019 07:46 PM    CREA 1.02 03/11/2019 07:46 PM    GFRAA >60 03/11/2019 07:46 PM    GFRNA >60 03/11/2019 07:46 PM    CA 9.0 03/11/2019 07:46 PM    MG 2.4 03/11/2019 07:46 PM    ALB 4.3 03/11/2019 07:46 PM    TP 7.4 03/11/2019 07:46 PM    GLOB 3.1 03/11/2019 07:46 PM    AGRAT 1.4 03/11/2019 07:46 PM    SGOT 13 (L) 03/11/2019 07:46 PM    ALT 30 03/11/2019 07:46 PM     CBC:   Lab Results   Component Value Date/Time    WBC 6.9 03/11/2019 07:46 PM    HGB 12.8 (L) 03/11/2019 07:46 PM    HCT 38.4 03/11/2019 07:46 PM     03/11/2019 07:46 PM     Liver Panel:   Lab Results   Component Value Date/Time    ALB 4.3 03/11/2019 07:46 PM    TP 7.4 03/11/2019 07:46 PM    GLOB 3.1 03/11/2019 07:46 PM    AGRAT 1.4 03/11/2019 07:46 PM    SGOT 13 (L) 03/11/2019 07:46 PM    ALT 30 03/11/2019 07:46 PM    AP 58 03/11/2019 07:46 PM       Imaging Reviewed:  No results found. Assessment/Plan     Active Hospital Problems    Diagnosis Date Noted    TIA (transient ischemic attack) 03/11/2019    Ataxia 03/11/2019   TIA (transient ischemic attack)  TIA/CVA protocol  Neurology consult  Possible new onset MS vs. TIA/CVA    Ataxia  Possible new onset MS based on CT findings as suggested by radiologist  Neurology consult  No steroids until further imaging completed and neurology evaluated        - Cont acceptable home medications for chronic conditions   - DVT protocol and GI prophylaxis    I have personally reviewed all pertinent labs, films and EKGs that have officially resulted. I reviewed available electronic documentation outlining the initial presentation as well as the emergency room physician's encounter. Total time spent with patient and chart review, orders and documentation - 45min out of which more than 50% spent face-to-face with patient.     Brea Mckeon MD  3/11/2019   10:19 PM      [address] [place] [address] [facility]

## 2019-03-12 NOTE — PROGRESS NOTES
Progress Note      Patient: Merly Horner               Sex: male          DOA: 3/11/2019       YOB: 1985      Age:  26 y.o.        LOS:  LOS: 1 day             CHIEF COMPLAINT:  Dizziness and Numbness      Subjective:     Pt still c/o balance problems and dizziness. Objective:      Visit Vitals  /75 (BP 1 Location: Right arm, BP Patient Position: Sitting)   Pulse 65   Temp 97.4 °F (36.3 °C)   Resp 16   Ht 5' 10\" (1.778 m)   Wt 65.8 kg (145 lb)   SpO2 99%   BMI 20.81 kg/m²       Physical Exam:  Physical Exam   Constitutional: He is oriented to person, place, and time and well-developed, well-nourished, and in no distress. HENT:   Head: Normocephalic and atraumatic. Eyes: Conjunctivae and EOM are normal. Pupils are equal, round, and reactive to light. Neck: Normal range of motion. Cardiovascular: Normal rate, regular rhythm and normal heart sounds. Pulmonary/Chest: Effort normal and breath sounds normal.   Abdominal: Soft. Bowel sounds are normal.   Musculoskeletal: Normal range of motion. Neurological: He is alert and oriented to person, place, and time. Skin: Skin is warm and dry. Nursing note and vitals reviewed. Lab/Data Reviewed:  BMP:   Lab Results   Component Value Date/Time     03/11/2019 07:46 PM    K 4.4 03/11/2019 07:46 PM     03/11/2019 07:46 PM    CO2 32 03/11/2019 07:46 PM    AGAP 3 03/11/2019 07:46 PM    GLU 87 03/11/2019 07:46 PM    BUN 13 03/11/2019 07:46 PM    CREA 1.02 03/11/2019 07:46 PM    GFRAA >60 03/11/2019 07:46 PM    GFRNA >60 03/11/2019 07:46 PM     CBC:   Lab Results   Component Value Date/Time    WBC 6.9 03/11/2019 07:46 PM    HGB 12.8 (L) 03/11/2019 07:46 PM    HCT 38.4 03/11/2019 07:46 PM     03/11/2019 07:46 PM       Imaging Reviewed:  Ct Head Wo Cont    Result Date: 3/12/2019  CT head without contrast INDICATION: Vertigo, dizziness and lightheadedness. COMPARISON: None.  TECHNIQUE: Axial CT imaging of the head was performed without intravenous contrast. One or more dose reduction techniques were used on this CT: automated exposure control, adjustment of the mAs and/or kVp according to patient size, and iterative reconstruction techniques. The specific techniques used on this CT exam have been documented in the patient's electronic medical record. Digital Imaging and Communications in Medicine (DICOM) format image data are available to nonaffiliated external healthcare facilities or entities on a secure, media free, reciprocally searchable basis with patient authorization for at least a 12-month period after this study. _______________ FINDINGS: BRAIN AND POSTERIOR FOSSA: There is mild generalized prominence of the sulci and ventricles for age. There are nonspecific periventricular and subcortical white matter hypodensities bilaterally. There is no intracranial hemorrhage, mass effect, or midline shift. EXTRA-AXIAL SPACES AND MENINGES: There are no abnormal extra-axial fluid collections. CALVARIUM: Intact. SINUSES: Clear. OTHER: None. _______________     IMPRESSION: 1. No evidence for acute cortical infarct, hemorrhage, or mass effect. CT can be negative in acute setting. 2. Mild generalized atrophy and nonspecific white matter hypodensities. Differential considerations include demyelinating process or small vessel ischemic changes. Clinical correlation and correlation with MRI should be considered for possible multiple sclerosis. A preliminary report provided by on call radiology resident. Assessment:     Principal Problem:    TIA (transient ischemic attack) (3/11/2019)    Active Problems:    Ataxia (3/11/2019)        PLAN:  · MRI pending  · Neuro consulted  · Cont acceptable home medications for chronic conditions  · DVT protocol    I have personally reviewed all pertinent labs and films that have officially resulted over the last 24 hours.  I have personally checked for all pending labs that are awaiting final results.       Signed By: Eirck Llanes MD     March 12, 2019

## 2019-03-12 NOTE — ROUTINE PROCESS
Assumed care of patient at 1925 report received from [facility] with SBAR, Intake & Output. Received patient in bed alert & oriented and in NAD denies nausea & pain. Girlfriend at the bedside. Dual NIH performed with APRIL Carrasquillo. Call bell within reach. 26 -  Received phone call from Dr. Felicita Jefferson for LP for tomorrow Patient NPO after MN. Informed patient. 2230 - Re-inserted new PIV # 20 G to left forearm attempted x 1 with blood return, pt tolerating venipuncture well.    0100 - Patient resting quietly and no distress noted. 0325 - Pt resting quietly.

## 2019-03-12 NOTE — ED NOTES
TRANSFER - ED to INPATIENT REPORT:    SBAR report made available to receiving floor on this patient being transferred to [facility] (2100)  for routine progression of care       Admitting diagnosis TIA (transient ischemic attack) [G45.9]  Ataxia [R27.0]  Ataxia [R27.0]  TIA (transient ischemic attack) [G45.9]    Information from the following report(s) SBAR was made available to receiving floor. Lines:   Peripheral IV 03/11/19 Left Antecubital (Active)   Site Assessment Clean, dry, & intact 3/11/2019  7:50 PM   Phlebitis Assessment 0 3/11/2019  7:50 PM   Infiltration Assessment 0 3/11/2019  7:50 PM   Dressing Status Clean, dry, & intact 3/11/2019  7:50 PM        Medication list unable to confirm    Opportunity for questions and clarification was provided.       Patient is oriented to time, place, person and situation Isolation precautions for NONE  Patient is  continent and ambulatory with assist     Valuables transported with patient     Patient transported with:   Registered Nurse    MAP (Monitor): 87 =Monitored (most recent)  Vitals w/ MEWS Score (last day)     Date/Time MEWS Score Pulse Resp Temp BP Level of Consciousness SpO2    03/11/19 2210  1  62  16  98.4 °F (36.9 °C)  107/83  Alert  100 %    03/11/19 2208  --  --  --  --  --  --  100 %    03/11/19 2207  --  --  --  --  107/83  --  --    03/11/19 1837  1  80  16  99.2 °F (37.3 °C)  132/82  Alert  100 %

## 2019-03-12 NOTE — PROGRESS NOTES
TRANSFER - IN REPORT:    SBAR report received from APRIL Briones(name) on Anuj Rank  being received from ED(unit) for routine progression of care      Report consisted of patients Situation, Background, Assessment and   Recommendations(SBAR). Information from the following report(s) SBAR, Kardex, Intake/Output, MAR and Recent Results was reviewed with the receiving nurse. Opportunity for questions and clarification was provided. Assessment completed upon patients arrival to unit and care. Received patient on stretcher with nurse accompanied to floor. Alert and oriented X 4. Denies pain or discomfort at present. Bed locked in lowest position. Call light within reach and understand to use for assistance and needs. 0300 Patient declined flu vaccine. 0500 Patient appears forgetful at time with delay responses. Reoriented patient at intervals. 0750 Bedside and Verbal shift change report given to Marion Hernandez RN (oncoming nurse) by Shannan Sifuentes RN (offgoing nurse). Report given with SBAR, Kardex, Intake/Output, MAR and Recent Results.

## 2019-03-12 NOTE — ROUTINE PROCESS
[phone]- Patient off floor to radiology for MRI. Patient awake and alert, no distress noted    1748- Patient back on floor. Patient awake and alert, 5 ps addressed, no distress noted.

## 2019-03-12 NOTE — PROGRESS NOTES
conducted an initial consultation and Spiritual Assessment for Joanne Sky, who is a 26 y. o.,male. Patients Primary Language is: [place]. According to the patients EMR Yarsani Affiliation is: No Sikh. The reason the Patient came to the hospital is:   Patient Active Problem List    Diagnosis Date Noted    TIA (transient ischemic attack) 03/11/2019    Ataxia 03/11/2019        The  provided the following Interventions:  Initiated a relationship of care and support with patient in room 2114 this morning  Listened empathically as patient talked about his being here and how much better he feels this morning. Patient said that he was miserable yeterday with the constant bathroom issues  Provided information about Spiritual Care Services. Offered prayer and assurance of continued prayers on patients behalf. The following outcomes were achieved:  Patient shared limited information about his medical narrative and spiritual journey/beliefs. Patient processed feeling about current hospitalization. Patient expressed gratitude for pastoral care visit. Assessment:  Patient does not have any Sabianism/cultural needs that will affect patients preferences in health care. There are no further spiritual or Sabianism issues which require Spiritual Care Services interventions at this time. Plan:  Chaplains will continue to follow and will provide pastoral care on an as needed/requested basis    . Robbie Jarrett   Spiritual Care   [phone]

## 2019-03-12 NOTE — PROGRESS NOTES
MRIs reviewed with Neuro-radiology. Very suggestive of active MS . Has old and new active lesions. Will set him for LP in the morning to check for routine testing and MS panel. Will start Solumedrol 1 gm IV every day x 5. He would eventually need aggressive disease modulating therapy with an MS center. D/w with his night RN and also I called him on the phone explained with him the findings and the plan for LP in the morning and Solumedrol to speed up the recovery from his current attack,.

## 2019-03-12 NOTE — PROGRESS NOTES
Problem: Falls - Risk of  Goal: *Absence of Falls  Document Basim Fall Risk and appropriate interventions in the flowsheet.   Outcome: Progressing Towards Goal  Fall Risk Interventions:  Mobility Interventions: Communicate number of staff needed for ambulation/transfer, Bed/chair exit alarm, Patient to call before getting OOB                   History of Falls Interventions: Bed/chair exit alarm, Investigate reason for fall, Room close to nurse's station, Door open when patient unattended

## 2019-03-12 NOTE — PROGRESS NOTES
NUTRITION  Patient/Family Education Record    FACTORS THAT MAY INFLUENCE PATIENTS ABILITY TO LEARN:   []   Language barrier    []   Cultural needs   []   Motivation    []   Cognitive limitation    []   Physical   []   Education   []   Physiological factors   []   Hearing/vision/speaking impairment   []   Anabaptism beliefs    []   Financial limitations    []  Other:   [x]   No barriers limiting ability to learn     Person Instructed:   [x]   Patient   []   Family   []  Other     Preference for Learning:   [x]   Verbal   [x]   Written   []  Demonstration     Patient educated on:   [] Cardiac/heart healthy diet  [] 2gm Sodium diet  [] Vitamin K regulated diet (coumadin)  [] Weight loss/portion control  [] High protein  [x] Other:General healthful diet    Goal:  Patient will demonstrate understanding of modified diet by maintaining  a general healthful diet    Outcome:   [x]  Patient verbalized understanding of education and willing to comply with recommendations.   []  Patient declined education  []  Patient needs follow up education; scheduled date for follow up:  [x]  Written information provided  []  RD contact information provided    [facility]

## 2019-03-12 NOTE — CONSULTS
Neurology Consult Note      Patient: Suzanna Baird MRN: [id number]  SSN: [id number]    YOB: 1985  Age: 26 y.o. Sex: male      Subjective:      Suzanna Baird is a 26 y.o.  male who is being seen for dizziness and ataxia. He had abrupt onset 2-3 weeks ago of sensation of spinning and difficulty walking. There was no progression of symptoms and denies illness prior to onset. Denies any past medical history but admits that he does not go to the doctor unless there is a problem. He is a  and around worksite dust and chemicals. History reviewed. No pertinent past medical history. History reviewed. No pertinent surgical history. History reviewed. No pertinent family history.   Social History     Tobacco Use    Smoking status: Never Smoker    Smokeless tobacco: Never Used   Substance Use Topics    Alcohol use: Yes     Comment: occ      Current Facility-Administered Medications   Medication Dose Route Frequency Provider Last Rate Last Dose    dextrose 5% and 0.9% NaCl infusion  0.75 mL/kg/hr IntraVENous CONTINUOUS Vince Benson MD 49.4 mL/hr at 03/12/19 0130 0.75 mL/kg/hr at 03/12/19 0130    ondansetron (ZOFRAN) injection 2 mg  2 mg IntraVENous Q6H PRN Vince Benson MD        aspirin tablet 325 mg  325 mg Oral DAILY Vince Benson MD   325 mg at 03/12/19 0916    atorvastatin (LIPITOR) tablet 80 mg  80 mg Oral QHS Vince Benson MD   80 mg at 03/12/19 0130    acetaminophen (TYLENOL) tablet 650 mg  650 mg Oral Q4H PRN Vince Benson MD        acetaminophen (TYLENOL) suppository 650 mg  650 mg Rectal Q4H PRN Vince Benson MD        senna-docusate (PERICOLACE) 8.6-50 mg per tablet 2 Tab  2 Tab Oral QHS Vince Benson MD   2 Tab at 03/12/19 0130    albuterol (PROVENTIL VENTOLIN) nebulizer solution 2.5 mg  2.5 mg Nebulization Q4H PRN Vince Benson MD        folic acid (FOLVITE) 1 mg, thiamine (B-1) 100 mg in 0.9% sodium chloride 50 mL ivpb IntraVENous ACD Vince Benson MD        acetaminophen (TYLENOL) tablet 650 mg  650 mg Oral Q4H PRN Vince Benson MD        pantoprazole (PROTONIX) tablet 40 mg  40 mg Oral Q12H Vince Benson MD   40 mg at 03/12/19 0916    dextrose 5 % - 0.45% NaCl infusion  75 mL/hr IntraVENous CONTINUOUS Katty Mathur MD 75 mL/hr at 03/12/19 0906 75 mL/hr at 03/12/19 0906        No Known Allergies    Labs:    Recent Results (from the past 24 hour(s))   EKG, 12 LEAD, INITIAL    Collection Time: 03/11/19  7:36 PM   Result Value Ref Range    Ventricular Rate 69 BPM    Atrial Rate 69 BPM    P-R Interval 164 ms    QRS Duration 94 ms    Q-T Interval 356 ms    QTC Calculation (Bezet) 381 ms    Calculated P Axis 56 degrees    Calculated R Axis 74 degrees    Calculated T Axis 55 degrees    Diagnosis       Normal sinus rhythm  Early repolarization  Normal ECG  No previous ECGs available  Confirmed by Oswaldo Briseno ([id number]) on 3/12/2019 70:77:59 AM     METABOLIC PANEL, COMPREHENSIVE    Collection Time: 03/11/19  7:46 PM   Result Value Ref Range    Sodium 140 136 - 145 mmol/L    Potassium 4.4 3.5 - 5.5 mmol/L    Chloride 105 100 - 108 mmol/L    CO2 32 21 - 32 mmol/L    Anion gap 3 3.0 - 18 mmol/L    Glucose 87 74 - 99 mg/dL    BUN 13 7.0 - 18 MG/DL    Creatinine 1.02 0.6 - 1.3 MG/DL    BUN/Creatinine ratio 13 12 - 20      GFR est AA >60 >60 ml/min/1.73m2    GFR est non-AA >60 >60 ml/min/1.73m2    Calcium 9.0 8.5 - 10.1 MG/DL    Bilirubin, total 0.5 0.2 - 1.0 MG/DL    ALT (SGPT) 30 16 - 61 U/L    AST (SGOT) 13 (L) 15 - 37 U/L    Alk.  phosphatase 58 45 - 117 U/L    Protein, total 7.4 6.4 - 8.2 g/dL    Albumin 4.3 3.4 - 5.0 g/dL    Globulin 3.1 2.0 - 4.0 g/dL    A-G Ratio 1.4 0.8 - 1.7     CBC WITH AUTOMATED DIFF    Collection Time: 03/11/19  7:46 PM   Result Value Ref Range    WBC 6.9 4.6 - 13.2 K/uL    RBC 4.20 (L) 4.70 - 5.50 M/uL    HGB 12.8 (L) 13.0 - 16.0 g/dL    HCT 38.4 36.0 - 48.0 %    MCV 91.4 74.0 - 97.0 FL    MCH 30.5 24.0 - 34.0 PG    MCHC 33.3 31.0 - 37.0 g/dL    RDW 13.5 11.6 - 14.5 %    PLATELET 878 793 - 304 K/uL    MPV 9.4 9.2 - 11.8 FL    NEUTROPHILS 77 (H) 40 - 73 %    LYMPHOCYTES 14 (L) 21 - 52 %    MONOCYTES 9 3 - 10 %    EOSINOPHILS 0 0 - 5 %    BASOPHILS 0 0 - 2 %    ABS. NEUTROPHILS 5.3 1.8 - 8.0 K/UL    ABS. LYMPHOCYTES 0.9 0.9 - 3.6 K/UL    ABS. MONOCYTES 0.6 0.05 - 1.2 K/UL    ABS. EOSINOPHILS 0.0 0.0 - 0.4 K/UL    ABS.  BASOPHILS 0.0 0.0 - 0.1 K/UL    DF AUTOMATED     MAGNESIUM    Collection Time: 03/11/19  7:46 PM   Result Value Ref Range    Magnesium 2.4 1.6 - 2.6 mg/dL   LIPID PANEL    Collection Time: 03/11/19  7:46 PM   Result Value Ref Range    LIPID PROFILE          Cholesterol, total 167 <200 MG/DL    Triglyceride 60 <150 MG/DL    HDL Cholesterol 56 40 - 60 MG/DL    LDL, calculated 99 0 - 100 MG/DL    VLDL, calculated 12 MG/DL    CHOL/HDL Ratio 3.0 0 - 5.0     HEMOGLOBIN A1C WITH EAG    Collection Time: 03/11/19  7:46 PM   Result Value Ref Range    Hemoglobin A1c 4.5 4.2 - 5.6 %    Est. average glucose Cannot be calculated mg/dL   SED RATE (ESR)    Collection Time: 03/11/19  7:46 PM   Result Value Ref Range    Sed rate, automated 1 0 - 15 mm/hr   TSH 3RD GENERATION    Collection Time: 03/11/19  7:46 PM   Result Value Ref Range    TSH 0.91 0.36 - 3.74 uIU/mL   T3, FREE    Collection Time: 03/11/19  7:46 PM   Result Value Ref Range    Triiodothyronine (T3), free 2.4 2.18 - 3.98 PG/ML   T4, FREE    Collection Time: 03/11/19  7:46 PM   Result Value Ref Range    T4, Free 0.8 0.7 - 1.5 NG/DL   VITAMIN B12    Collection Time: 03/11/19  9:38 PM   Result Value Ref Range    Vitamin B12 450 211 - 911 pg/mL   TSH 3RD GENERATION    Collection Time: 03/11/19  9:38 PM   Result Value Ref Range    TSH 1.20 0.36 - 3.74 uIU/mL   GLUCOSE, POC    Collection Time: 03/12/19 12:45 AM   Result Value Ref Range    Glucose (POC) 64 (L) 70 - 110 mg/dL   GLUCOSE, POC    Collection Time: 03/12/19  7:58 AM   Result Value Ref Range Glucose (POC) 61 (L) 70 - 110 mg/dL   GLUCOSE, POC    Collection Time: 03/12/19  8:11 AM   Result Value Ref Range    Glucose (POC) 74 70 - 110 mg/dL   GLUCOSE, POC    Collection Time: 03/12/19  8:29 AM   Result Value Ref Range    Glucose (POC) 118 (H) 70 - 110 mg/dL   GLUCOSE, POC    Collection Time: 03/12/19 11:30 AM   Result Value Ref Range    Glucose (POC) 97 70 - 110 mg/dL       Testing:  CT Results (most recent):  Results from Hospital Encounter encounter on 03/11/19   CT HEAD WO CONT    Narrative CT head without contrast    INDICATION: Vertigo, dizziness and lightheadedness. COMPARISON: None. TECHNIQUE: Axial CT imaging of the head was performed without intravenous  contrast. One or more dose reduction techniques were used on this CT: automated  exposure control, adjustment of the mAs and/or kVp according to patient size,  and iterative reconstruction techniques. The specific techniques used on this  CT exam have been documented in the patient's electronic medical record. Digital  Imaging and Communications in Medicine (DICOM) format image data are available  to nonaffiliated external healthcare facilities or entities on a secure, media  free, reciprocally searchable basis with patient authorization for at least a  12-month period after this study. _______________    FINDINGS:    BRAIN AND POSTERIOR FOSSA: There is mild generalized prominence of the sulci and  ventricles for age. There are nonspecific periventricular and subcortical white  matter hypodensities bilaterally. There is no intracranial hemorrhage, mass  effect, or midline shift. EXTRA-AXIAL SPACES AND MENINGES: There are no abnormal extra-axial fluid  collections. CALVARIUM: Intact. SINUSES: Clear. OTHER: None.    _______________      Impression IMPRESSION:    1. No evidence for acute cortical infarct, hemorrhage, or mass effect. CT can be  negative in acute setting.     2. Mild generalized atrophy and nonspecific white matter hypodensities. Differential considerations include demyelinating process or small vessel  ischemic changes. Clinical correlation and correlation with MRI should be  considered for possible multiple sclerosis. A preliminary report provided by on call radiology resident. MRI Results (most recent):  No results found for this or any previous visit. Review of Systems:    Y  N   Constitutional: [] [x] recent weight change  [] [x] fever  [] [x] sleep difficulties   ENT/Mouth:  [] [x] hearing loss  [] [x] swallowing problems  [] [x] slurred speech   Cardiovascular:  [] [x] chest pain   [] [x] palpitations    Respiratory: [] [x] cough with swallow  [] [] shortness of breath  [] [] sleep apnea  [] [] intubated   Gastrointestinal: [] [x] abdominal pain  [] [] nausea   Genitourinary: [] [x] frequent urination  [] [] incontinence    Musculoskeletal:   [] [x] joint pain  [] [x] muscle pain   Integument:   [] [] rash/itching   Neurological:  [x] [] dizziness/vertigo  [] [] sedation  [] [] confusion  [] [] agitation/combativeness  [] [x] loss of consciousness  [] [x] numbness/tingling sensation  [] [x] tremors  [] [x] weakness in limbs  [x] [] difficulty with balance  [] [x] frequent or recurring headaches  [] [x] memory loss   [] [] comatose  [] [x] seizures   Psychiatric:   [] [] depression  [] [] hallucinations   Endocrine: [] [] excessive thirst or urination   [] [] heat or cold intolerance   Hematologic/Lymphatic: [] [] bleeding tendency  [] [] enlarged lymph nodes         Objective:     Vitals:    03/12/19 0400 03/12/19 0600 03/12/19 0800 03/12/19 0950   BP: 117/76 113/67 115/70 124/75   Pulse: 88 72 70 65   Resp: 16 14 15 16   Temp: 97.8 °F (36.6 °C) 97.5 °F (36.4 °C) 97.6 °F (36.4 °C) 97.4 °F (36.3 °C)   SpO2: 100% 100% 100% 99%   Weight:       Height:            Physical Exam:   Appearance:  In no acute distress, well developed, well nourished, cooperative   Cardiovascular: Heart is regular rate and rhythm, peripheral edema is absent. No carotid bruits heard  Neurological examination:   Mental status examination: Alert and oriented times three. Normal speech, memory and language. Normal attention and fund of knowledge. Cranial nerves:   Optic nerve: vision intact bilaterally, peripheral vision is grossly normal to confrontation. Oculomotor, Trochlear and abducens nerves: papillary response to light is brisk. Saccadic intrusions with some nystagmus to lateral gazes. Trigeminal nerve: facial sensation normal bilaterally, normal movements of the jaw. Normal corneal reflexes. Facial nerve: No facial weakness was present. Vestibuloacoustic nerve: hearing is normal bilaterally. Glossopharyngeal and Vagus nerves: Soft palate elevation is normal.   Spinal accessory nerve: shoulder shrug and head turn strength is normal.   Hypoglossal nerve: tongue movements normal.   Motor exam: Strength was 5/5 in the upper and lower extremities except 4/5 to LLE and drift with LUE. Muscle tone was normal. No atrophy. No fasciculations. Sensory: Normal pinprick, light touch, position and vibration sensation. Coordination: Good finger-to-nose and heel to shin testing. Normal rapid alternating movements. Deep tendon reflexes: 2+ in bilateral biceps, triceps, brachioradialis, patellar, ankle reflexes. No Babinski responses. Gait: Widened base.  +rhomberg. Unable to toe or heel walk. Assessment/Plan:  Does have some focal neurological deficits with localized weakness along with gait disabilities. MRI brain with and without to help r/o stroke, ADEM, MS, or other structural abnormalities.   Will also test for heavy metal.    If MRI negative may need LP for VDRL, Lyme, HIV     Hospital Problems  Never Reviewed          Codes Class Noted POA    * (Principal) TIA (transient ischemic attack) ICD-10-CM: G45.9  ICD-9-CM: 435.9  3/11/2019 Yes        Ataxia ICD-10-CM: R27.0  ICD-9-CM: 781.3  3/11/2019 Yes                Signed By: Radha Reilly NP     March 12, 2019

## 2019-03-12 NOTE — PROGRESS NOTES
Problem: Self Care Deficits Care Plan (Adult)  Goal: *Acute Goals and Plan of Care (Insert Text)  Occupational Therapy Goals  Initiated 3/12/2019 within 7 day(s). 1.  Patient will perform grooming tasks while standing with supervision and fair dynamic standing balance. 2.  Patient will perform lower body dressing with modified independence and fair+ dynamic sitting balance. 3.  Patient will perform functional task in standing for 8 minutes with supervision and good safety awareness in prep for ADLs. 4.  Patient will perform toilet transfers with supervision. 5.  Patient will perform all aspects of toileting with supervision/set-up. 6.  Patient will participate in upper extremity therapeutic exercise/activities for 8 minutes with supervision/set-up to maintain BUE strength for functional transfers and ADLs. 7.  Patient will utilize energy conservation techniques during functional activities with minimal verbal cues. Outcome: Progressing Towards Goal  [facility]  Occupational THERAPY: Initial Assessment   INPATIENT: Self-pay: Hospital Day: 2     Patient: Maurilio Bryant (26 y.o. male)    Date: 3/12/2019  Primary Diagnosis: TIA (transient ischemic attack) [G45.9]  Ataxia [R27.0]  Ataxia [R27.0]  TIA (transient ischemic attack) [G45.9]   ,  ,   Precautions: Falls  PLOF: Pt was independent with basic self care tasks and functional mobility PTA. ASSESSMENT:   Based on the objective data described below, the patient presents with impairments with regard to bed mobility, functional transfers and independence in ADLs secondary to new onset of dizziness & ataxia. Pt supine with HOB elevated on arrival, no c/o pain or dizziness. Supervision & additional time for supine-->sit. Good, static sitting balance. Fair dynamic sitting balance during LB dressing. SBA/CGA to stand with fair balance; ataxic BLE during functional mobility with subsequent unsteadiness noted.  Pt maneuvered from EOB<-->sink in prep for ADLs. Good safety awareness noted, however, pt is not at baseline with regard to functional mobility. Pt will benefit from continued therapy during acute care stay to maximize independence in functional mobility & ADLs. Pt left supine with HOB elevated and needs within reach. Recommendations for the next treatment session: ADL training   Mr. Enrike Tran will benefit from skilled intervention to address the above impairments. His rehabilitation potential is considered to be Good. EDUCATION     Education:  Patient was educated on the following topics: role of OT and POC  Barriers to Learning/Limitations: None  Compensate with: visual, verbal, tactile, kinesthetic cues/model     PLAN OF CARE:  Problems:  Decreased ROM, Decreased strength affecting function, Decreased ADL/functioning of activities and Decreased transfer abilities    Recommendations and Planned Interventions:  [x]                  Self Care Training                   [x]         Therapeutic Activities  [x]                  Functional Mobility Training    []          Cognitive Retraining  [x]                  Therapeutic Exercises            [x]          Endurance Activities  [x]                  Balance Training                     []          Neuromuscular Re-ed   []                  Visual/Perceptual Training      [x]       Home Safety Training  [x]                  Patient Education                    [x]          Family Training/Education  []                  Other (comment):    Frequency/Duration: Patient will be followed by occupational therapy 3 times a week to address goals. Discharge Recommendations: Home Health (pending progress with therapy during hospital stay)    Further Equipment Recommendations for Discharge: shower chairSUBJECTIVE:  Patient stated: \"I'm okay. \"    OBJECTIVE/TREATMENT:   History reviewed. No pertinent past medical history. History reviewed. No pertinent surgical history.      Eval Complexity: History: LOW Complexity : Brief history review ; Examination: LOW Complexity : 1-3 performance deficits relating to physical, cognitive , or psychosocial skils that result in activity limitations and / or participation restrictions ; Decision Making:MEDIUM Complexity : Patient may present with comorbidities that affect occupational performnce.  Miniml to moderate modification of tasks or assistance (eg, physical or verbal ) with assesment(s) is necessary to enable patient to complete evaluation     Prior Level of Function/Home Situation:   Fully active; able to carry on all performance without restriction  Lives with Family  none reported    Cognitive/Behavioral Status:   Neurologic State: alert   Orientation: oriented to time, place, person and situation  Cognition:   appropriate decision making, appropriate for age attention/concentration, appropriate safety awareness and following commands  follows multi-step simple commands/direction   Safety/Judgement: Awareness of environment and Fall prevention    ROM: within normal limits (BUEs)  MMT: 5/5 (BUEs)  Coordination: BUEs WFL  Hand dominance:Right    Skin: Intact (BUEs)  Edema: None noted (BUEs)  Sensation: Intact (BUEs)    Vision/Perceptual: normal      Functional Status      Indep   Mod I   Sup. /  Set- Up    SBA   CGA   Min Assist   Mod Assist   Max assist   Total Assist   Assist x2    Additional Time   NT   Comments   Rolling []  []  [x]  []  []    []    []    []  []  []  []  []     Supine to sit []  []  [x]  []  []  []  []  []  []  []  []  []     Sit to supine []  []  [x]  []  []  []  []  []  []  []  []  []     Sit to stand []  []  []  []  [x]  []  []  []  []  []  []  []     Toilet Transfer []  []  []  []  [x]  []  []  []  []  []  []  []                     Feeding []  []  [x]  []  []  []  []  []  []  []  []  []     Grooming []  []  [x]  []  []  []  []  []  []  []  []  []     Bathing  []  []  []  [x]  []  []  []  []  []  []  []  []     UB Dressing  []  []  [x]  []  []  []  []  []  [] []  []  []     LB Dressing  []  []  []  [x]  [x]  []  []  []  []  []  []  []     Toileting []  []  [x]  []  []  []  []  []  []  []  []  []         Balance    Good   Fair   Poor   Unable   Comments   Sitting static [x]  []  []  []     Sitting dynamic []  [x]  []  []     Standing static []  [x]  []  []     Standing dynamic []  [x]  []  []       Pain:   Pre treatment: 0/10  Post treatment: 0/10  Scale: numeric    Activity tolerance:  fair    COMMUNICATION/EDUCATION: Pt educated on role of OT and POC; he verbalized understanding. [x]         Fall prevention education was provided and the patient/caregiver indicated understanding. [x]         Patient/family have participated as able in goal setting and plan of care. [x]         Patient/family agree to work toward stated goals and plan of care. []         Patient understands intent and goals of therapy, but is neutral about his/her participation     The patients plan of care was also discussed with: Physical Therapist, Certified Occupational Therapy Assistant and Registered Nurse.      · After treatment position/precautions:   o Supine in bed  o Call light within reach  o RN notified     Recommendations for nursing: up with assist x1    Thank you for this referral.  Maryam Torrez MS OTR/L  Time Calculation: 11 mins

## 2019-03-12 NOTE — PROGRESS NOTES
Nutrition initial assessment/Plan of care      RECOMMENDATIONS:   1. Regular Diet  2. Monitor labs, weight and PO intake  3. RD to follow     GOALS:   1. PO intake meets >75% of protein/calorie needs by 3/19  2. Weight Maintenance (+/- 1-2 lb) by 3/19       ASSESSMENT:   Wt status is classified as normal per BMI of 20.8. Adequate PO intake. Labs noted. BG range () over the past 24 hours. Lipid panel WNL. Nutrition recommendations listed. RD to follow. Nutrition Diagnoses:   None at this time. Nutrition Risk:  [] High  [] Moderate [x]  Low    SUBJECTIVE/OBJECTIVE:    Pt admitted TIA and ataxia. Pt seen in room after lunch; observed 90% of meal consumed. Denies having any food allergies or problems chewing/swallowing and stable weight PTA; -150 lb. Reports having a good appetite and consuming 2-3 meals per day at home. Noted lipid panel and A1c WNL. Discussed recommendations for a general healthful diet; handouts provided. Will monitor. Information Obtained from:    [x] Chart Review   [x] Patient   [] Family/Caregiver   [] Nurse/Physician   [] Interdisciplinary Meeting/Rounds      Diet: Regular Diet  Medications: [x] Reviewed   IV: D5 1/2 NS @75 mL/hr (306 kcal/day)   Allergies: [x] Reviewed   Encounter Diagnoses     ICD-10-CM ICD-9-CM   1. Ataxia R27.0 781.3     History reviewed. No pertinent past medical history.    Labs:    Lab Results   Component Value Date/Time    Sodium 140 03/11/2019 07:46 PM    Potassium 4.4 03/11/2019 07:46 PM    Chloride 105 03/11/2019 07:46 PM    CO2 32 03/11/2019 07:46 PM    Anion gap 3 03/11/2019 07:46 PM    Glucose 87 03/11/2019 07:46 PM    BUN 13 03/11/2019 07:46 PM    Creatinine 1.02 03/11/2019 07:46 PM    Calcium 9.0 03/11/2019 07:46 PM    Magnesium 2.4 03/11/2019 07:46 PM    Albumin 4.3 03/11/2019 07:46 PM     Lab Results   Component Value Date/Time    Cholesterol, total 167 03/11/2019 07:46 PM    HDL Cholesterol 56 03/11/2019 07:46 PM    LDL, calculated 99 03/11/2019 07:46 PM    VLDL, calculated 12 03/11/2019 07:46 PM    Triglyceride 60 03/11/2019 07:46 PM    CHOL/HDL Ratio 3.0 03/11/2019 07:46 PM       Anthropometrics: BMI (calculated): 20.8  Last 3 Recorded Weights in this Encounter    03/11/19 1837   Weight: 65.8 kg (145 lb)      Ht Readings from Last 1 Encounters:   03/12/19 5' 10\" (1.778 m)     Weight Metrics 3/11/2019 12/15/2017 12/15/2014   Weight 145 lb 170 lb 167 lb   BMI 20.81 kg/m2 23.71 kg/m2 23.96 kg/m2       Patient Vitals for the past 100 hrs:   % Diet Eaten   03/12/19 1119 100 %   03/12/19 0822 80 %       IBW: 166 lb %IBW: 87% UBW: 145-150 lb   [] Weight Loss [] Weight Gain [x] Weight Stable    Estimated Nutrition Needs: [x] MSJ  [] Other:  Calories: 2190-8426 kcal Based on:   [x] Actual BW    Protein:   66-75 g Based on:   [x] Actual BW    Fluid:       3352-7558 ml Based on:   [x] Actual BW      [x] No Cultural, Hoahaoism or ethnic dietary need identified.     [] Cultural, Hoahaoism and ethnic food preferences identified and addressed     Wt Status:  [x] Normal (18.6 - 24.9) [] Underweight (<18.5) [] Overweight (25 - 29.9) [] Mild Obesity (30 - 34.9)  [] Moderate Obesity (35 - 39.9) [] Morbid Obesity (40+)       Nutrition Problems Identified:   [] Suboptimal PO intake   [] Food Allergies  [] Difficulty chewing/swallowing/poor dentition  [] Constipation/Diarrhea   [] Nausea/Vomiting   [x] None  [] Other:     Plan:   [] Therapeutic Diet  []  Obtained/adjusted food preferences/tolerances and/or snacks options   []  Supplements added   [] Occupational therapy following for feeding techniques  []  HS snack added   []  Modify diet texture   []  Modify diet for food allergies   [x]  Educate patient   []  Assist with menu selection   [x]  Monitor PO intake on meal rounds   [x]  Continue inpatient monitoring and intervention   []  Participated in discharge planning/Interdisciplinary rounds/Team meetings   []  Other:     Education Needs:   [] Not appropriate for teaching at this time due to:   [x] Identified and addressed    Nutrition Monitoring and Evaluation:  [x] Continue ongoing monitoring and intervention  [] Patt Du

## 2019-03-13 ENCOUNTER — APPOINTMENT (OUTPATIENT)
Dept: NON INVASIVE DIAGNOSTICS | Age: 34
DRG: 043 | End: 2019-03-13
Attending: FAMILY MEDICINE
Payer: MEDICAID

## 2019-03-13 ENCOUNTER — HOME HEALTH ADMISSION (OUTPATIENT)
Dept: HOME HEALTH SERVICES | Facility: HOME HEALTH | Age: 34
End: 2019-03-13
Payer: MEDICAID

## 2019-03-13 ENCOUNTER — APPOINTMENT (OUTPATIENT)
Dept: GENERAL RADIOLOGY | Age: 34
DRG: 043 | End: 2019-03-13
Attending: PSYCHIATRY & NEUROLOGY
Payer: MEDICAID

## 2019-03-13 LAB
APPEARANCE CSF: CLEAR
ARSENIC BLD-MCNC: 4 UG/L (ref 2–23)
COLOR CSF: COLORLESS
CRYPTOC AG CSF QL LA: NEGATIVE
GLUCOSE BLD STRIP.AUTO-MCNC: 115 MG/DL (ref 70–110)
GLUCOSE BLD STRIP.AUTO-MCNC: 122 MG/DL (ref 70–110)
GLUCOSE BLD STRIP.AUTO-MCNC: 125 MG/DL (ref 70–110)
GLUCOSE BLD STRIP.AUTO-MCNC: 138 MG/DL (ref 70–110)
GLUCOSE CSF-MCNC: 84 MG/DL (ref 40–70)
HISPANIC, LDP2T: NO
HIV 1+2 AB+HIV1 P24 AG SERPL QL IA: NONREACTIVE
HIV12 RESULT COMMENT, HHIVC: NORMAL
LEAD BLD-MCNC: NORMAL UG/DL (ref 0–4)
MERCURY BLD-MCNC: NORMAL UG/L (ref 0–14.9)
PROT CSF-MCNC: 36 MG/DL (ref 15–45)
RACE, 017371: NORMAL
RBC # CSF: 0 /CU MM
SPECIMEN SOURCE: NORMAL
T PALLIDUM AB SER QL IA: NONREACTIVE
TEST PURPOSE, LDP4T: NORMAL
TUBE # CSF: 1
TUBE # CSF: 1
TUBE # CSF: 3
WBC # CSF: 3 /CU MM

## 2019-03-13 PROCEDURE — 74011250637 HC RX REV CODE- 250/637: Performed by: FAMILY MEDICINE

## 2019-03-13 PROCEDURE — 87070 CULTURE OTHR SPECIMN AEROBIC: CPT

## 2019-03-13 PROCEDURE — 82962 GLUCOSE BLOOD TEST: CPT

## 2019-03-13 PROCEDURE — 97535 SELF CARE MNGMENT TRAINING: CPT

## 2019-03-13 PROCEDURE — 86592 SYPHILIS TEST NON-TREP QUAL: CPT

## 2019-03-13 PROCEDURE — 82945 GLUCOSE OTHER FLUID: CPT

## 2019-03-13 PROCEDURE — 84157 ASSAY OF PROTEIN OTHER: CPT

## 2019-03-13 PROCEDURE — 74011250636 HC RX REV CODE- 250/636: Performed by: HOSPITALIST

## 2019-03-13 PROCEDURE — 93306 TTE W/DOPPLER COMPLETE: CPT

## 2019-03-13 PROCEDURE — 82042 OTHER SOURCE ALBUMIN QUAN EA: CPT

## 2019-03-13 PROCEDURE — 009U3ZZ DRAINAGE OF SPINAL CANAL, PERCUTANEOUS APPROACH: ICD-10-PCS | Performed by: RADIOLOGY

## 2019-03-13 PROCEDURE — 77003 FLUOROGUIDE FOR SPINE INJECT: CPT

## 2019-03-13 PROCEDURE — 97116 GAIT TRAINING THERAPY: CPT

## 2019-03-13 PROCEDURE — 74011000250 HC RX REV CODE- 250: Performed by: HOSPITALIST

## 2019-03-13 PROCEDURE — 87327 CRYPTOCOCCUS NEOFORM AG IA: CPT

## 2019-03-13 PROCEDURE — 74011000258 HC RX REV CODE- 258: Performed by: HOSPITALIST

## 2019-03-13 PROCEDURE — 74011250636 HC RX REV CODE- 250/636: Performed by: PSYCHIATRY & NEUROLOGY

## 2019-03-13 PROCEDURE — 77030021352 HC CBL LD SYS DISP COVD -B

## 2019-03-13 PROCEDURE — 87798 DETECT AGENT NOS DNA AMP: CPT

## 2019-03-13 PROCEDURE — 89050 BODY FLUID CELL COUNT: CPT

## 2019-03-13 PROCEDURE — 65660000000 HC RM CCU STEPDOWN

## 2019-03-13 RX ORDER — FOLIC ACID 1 MG/1
1 TABLET ORAL DAILY
Status: DISCONTINUED | OUTPATIENT
Start: 2019-03-14 | End: 2019-03-16 | Stop reason: HOSPADM

## 2019-03-13 RX ORDER — ASPIRIN 325 MG/1
100 TABLET, FILM COATED ORAL DAILY
Status: DISCONTINUED | OUTPATIENT
Start: 2019-03-14 | End: 2019-03-16 | Stop reason: HOSPADM

## 2019-03-13 RX ORDER — LIDOCAINE HYDROCHLORIDE 10 MG/ML
5 INJECTION, SOLUTION EPIDURAL; INFILTRATION; INTRACAUDAL; PERINEURAL
Status: COMPLETED | OUTPATIENT
Start: 2019-03-13 | End: 2019-03-13

## 2019-03-13 RX ADMIN — ACETAMINOPHEN 650 MG: 325 TABLET, FILM COATED ORAL at 22:10

## 2019-03-13 RX ADMIN — SENNOSIDES AND DOCUSATE SODIUM 2 TABLET: 8.6; 5 TABLET ORAL at 21:50

## 2019-03-13 RX ADMIN — DEXTROSE MONOHYDRATE AND SODIUM CHLORIDE 75 ML/HR: 5; .45 INJECTION, SOLUTION INTRAVENOUS at 07:40

## 2019-03-13 RX ADMIN — DEXTROSE MONOHYDRATE AND SODIUM CHLORIDE 75 ML/HR: 5; .45 INJECTION, SOLUTION INTRAVENOUS at 21:51

## 2019-03-13 RX ADMIN — SODIUM CHLORIDE 1000 MG: 900 INJECTION, SOLUTION INTRAVENOUS at 23:00

## 2019-03-13 RX ADMIN — FOLIC ACID: 5 INJECTION, SOLUTION INTRAMUSCULAR; INTRAVENOUS; SUBCUTANEOUS at 16:55

## 2019-03-13 RX ADMIN — PANTOPRAZOLE SODIUM 40 MG: 40 TABLET, DELAYED RELEASE ORAL at 21:50

## 2019-03-13 RX ADMIN — LIDOCAINE HYDROCHLORIDE 5 ML: 10 INJECTION, SOLUTION EPIDURAL; INFILTRATION; INTRACAUDAL; PERINEURAL at 13:37

## 2019-03-13 RX ADMIN — PANTOPRAZOLE SODIUM 40 MG: 40 TABLET, DELAYED RELEASE ORAL at 14:57

## 2019-03-13 NOTE — PROGRESS NOTES
Assumed patient care from Vega Uribe [facility]. Patient is awake, alert, oriented x4. Patient denies pain. Dual [facility] done with Vega Uribe RN. Bed is locked and in lowest position and call bell is within reach. Not in acute distress.

## 2019-03-13 NOTE — ROUTINE PROCESS
Bedside and Verbal shift change report given to Divya Licona RN (oncoming nurse) by Kirk Mendiola RN (offgoing nurse). Report included the following information SBAR, Kardex, Intake/Output, MAR and Recent Results. Dual [facility] completed.

## 2019-03-13 NOTE — PROGRESS NOTES
Neurology Progress Note      Patient: Maurilio Bryant MRN: [id number]  SSN: [id number]    YOB: 1985  Age: 26 y.o. Sex: male      Subjective:   Tearful this AM.  States he does not know what is going on. States vision less blurry and feels stronger. History reviewed. No pertinent past medical history. History reviewed. No pertinent surgical history. History reviewed. No pertinent family history.   Social History     Tobacco Use    Smoking status: Never Smoker    Smokeless tobacco: Never Used   Substance Use Topics    Alcohol use: Yes     Comment: occ      Current Facility-Administered Medications   Medication Dose Route Frequency Provider Last Rate Last Dose    dextrose 5% and 0.9% NaCl infusion  0.75 mL/kg/hr IntraVENous CONTINUOUS Vince Benson MD 49.4 mL/hr at 03/12/19 0130 0.75 mL/kg/hr at 03/12/19 0130    ondansetron (ZOFRAN) injection 2 mg  2 mg IntraVENous Q6H PRN Vince Benson MD        aspirin tablet 325 mg  325 mg Oral DAILY Vince Benson MD   325 mg at 03/12/19 0916    atorvastatin (LIPITOR) tablet 80 mg  80 mg Oral QHS Vince Benson MD   80 mg at 03/12/19 2215    acetaminophen (TYLENOL) tablet 650 mg  650 mg Oral Q4H PRN Vince Benson MD        acetaminophen (TYLENOL) suppository 650 mg  650 mg Rectal Q4H PRN Vince Benson MD        senna-docusate (PERICOLACE) 8.6-50 mg per tablet 2 Tab  2 Tab Oral QHS Vince Benson MD   2 Tab at 03/12/19 0130    albuterol (PROVENTIL VENTOLIN) nebulizer solution 2.5 mg  2.5 mg Nebulization Q4H PRN Vince Benson MD        folic acid (FOLVITE) 1 mg, thiamine (B-1) 100 mg in 0.9% sodium chloride 50 mL ivpb   IntraVENous ACD Vince Benson MD        acetaminophen (TYLENOL) tablet 650 mg  650 mg Oral Q4H PRN Vince Benson MD        pantoprazole (PROTONIX) tablet 40 mg  40 mg Oral Q12H Vince Benson MD   40 mg at 03/12/19 2216    dextrose 5 % - 0.45% NaCl infusion  75 mL/hr IntraVENous CONTINUOUS Richi Poor, MD 75 mL/hr at 03/12/19 0906 75 mL/hr at 03/12/19 0906    methylPREDNISolone ((Solu-MEDROL) 1,000 mg in 0.9% sodium chloride 250 mL IVPB  1,000 mg IntraVENous Q24H Cheryl Cooper MD   1,000 mg at 03/12/19 6493        No Known Allergies    Labs:    Recent Results (from the past 24 hour(s))   GLUCOSE, POC    Collection Time: 03/12/19 11:30 AM   Result Value Ref Range    Glucose (POC) 97 70 - 110 mg/dL   PROTHROMBIN TIME + INR    Collection Time: 03/12/19  1:24 PM   Result Value Ref Range    Prothrombin time 12.5 11.5 - 15.2 sec    INR 1.0 0.8 - 1.2     PTT    Collection Time: 03/12/19  1:24 PM   Result Value Ref Range    aPTT 27.8 23.0 - 36.4 SEC   DUPLEX CAROTID BILATERAL    Collection Time: 03/12/19  2:52 PM   Result Value Ref Range    Right cca dist sys 135.8 cm/s    Right CCA dist mane 19.5 cm/s    RIGHT COMMON CAROTID ARTERY MID S 116.10 cm/s    RIGHT COMMON CAROTID ARTERY MID D 17.30 cm/s    Right CCA prox sys 168.8 cm/s    Right CCA prox mane 26.1 cm/s    Right vertebral sys 66.2 cm/s    RIGHT VERTEBRAL ARTERY D 13.40 cm/s    Right eca sys 114.6 cm/s    RIGHT EXTERNAL CAROTID ARTERY D 16.30 cm/s    Right ICA dist sys 76.9 cm/s    Right ICA dist mnae 26.5 cm/s    Right ICA mid sys 92.8 cm/s    Right ICA mid mane 26.5 cm/s    Right ICA prox sys 81.8 cm/s    Right ICA prox mane 21.6 cm/s    Right subclavian sys 143.0 cm/s    RIGHT SUBCLAVIAN ARTERY D 0.00 cm/s    Right ICA/CCA sys 0.6     Left CCA dist sys 123.3 cm/s    Left CCA dist mane 24.6 cm/s    LEFT COMMON CAROTID ARTERY MID S 140.30 cm/s    LEFT COMMON CAROTID ARTERY MID D 21.20 cm/s    Left CCA prox sys 153.2 cm/s    Left CCA prox mane 23.8 cm/s    Left vertebral sys 67.8 cm/s    LEFT VERTEBRAL ARTERY D 17.40 cm/s    Left ECA sys 93.6 cm/s    LEFT EXTERNAL CAROTID ARTERY D 12.50 cm/s    Left ICA dist sys 89.9 cm/s    Left ICA dist mane 35.1 cm/s    Left ICA mid sys 86.2 cm/s    Left ICA mid mane 27.8 cm/s    Left ICA prox sys 82.6 cm/s    Left ICA prox mane 20.5 cm/s    Left subclavian sys 153.3 cm/s    LEFT SUBCLAVIAN ARTERY D 0.00 cm/s    Left ICA/CCA sys 0.60    GLUCOSE, POC    Collection Time: 03/12/19  5:41 PM   Result Value Ref Range    Glucose (POC) 66 (L) 70 - 110 mg/dL   GLUCOSE, POC    Collection Time: 03/12/19  5:55 PM   Result Value Ref Range    Glucose (POC) 92 70 - 110 mg/dL   GLUCOSE, POC    Collection Time: 03/12/19  9:14 PM   Result Value Ref Range    Glucose (POC) 140 (H) 70 - 110 mg/dL   GLUCOSE, POC    Collection Time: 03/13/19  7:46 AM   Result Value Ref Range    Glucose (POC) 125 (H) 70 - 110 mg/dL       Testing:  CT Results (most recent):  Results from Hospital Encounter encounter on 03/11/19   CT HEAD WO CONT         Impression IMPRESSION:    1. No evidence for acute cortical infarct, hemorrhage, or mass effect. CT can be  negative in acute setting. 2. Mild generalized atrophy and nonspecific white matter hypodensities. Differential considerations include demyelinating process or small vessel  ischemic changes. Clinical correlation and correlation with MRI should be  considered for possible multiple sclerosis. MRI Results (most recent):  Results from [place] encounter on 03/11/19   MRI [facility] SPINE W WO CONT         Impression IMPRESSION:  1. Enhancing intrinsic cord lesion T12 without cord enlargement. Given  additional findings and brain and cervical cord, findings likely related to area  of demyelination in this patient with likely multiple sclerosis. Enhancement  suggests more acute area of demyelination. 2. No other significant finding. MRI Cervical Spine     1. C2 intrinsic cord lesion with small associated enhancement without cord  enlargement, with several additional nonenhancing small cord lesions C4-C6. Given findings within the brain, findings likely related to areas of  demyelination and multiple sclerosis.  Enhancing C2 lesion suggesting more acute  area of demyelination.      2. Multilevel mild degenerative spondylosis and small disc protrusions as above  with no high-grade stenosis. MRI Brain  1. Extensive mostly supratentorial white matter lesions with a typical pattern  of demyelinating process such as multiple sclerosis. Many of these lesions show  associated enhancement suggesting more active areas of demyelination. Additional  involvement brainstem and mildly involving cerebellum, with single enhancing  lesion superior left cerebellum.     2. Many of these supratentorial lesions have associated T1   hypointensity.     Review of Systems:    Y  N   Constitutional: [] [x] recent weight change  [] [x] fever  [] [x] sleep difficulties   ENT/Mouth:  [] [x] hearing loss  [] [x] swallowing problems  [] [x] slurred speech   Cardiovascular:  [] [x] chest pain   [] [x] palpitations    Respiratory: [] [x] cough with swallow  [] [] shortness of breath  [] [] sleep apnea  [] [] intubated   Gastrointestinal: [] [x] abdominal pain  [] [] nausea   Genitourinary: [] [x] frequent urination  [] [] incontinence    Musculoskeletal:   [] [x] joint pain  [] [x] muscle pain   Integument:   [] [] rash/itching   Neurological:  [x] [] dizziness/vertigo  [] [] sedation  [] [] confusion  [] [] agitation/combativeness  [] [x] loss of consciousness  [] [x] numbness/tingling sensation  [] [x] tremors  [] [x] weakness in limbs  [x] [] difficulty with balance  [] [x] frequent or recurring headaches  [] [x] memory loss   [] [] comatose  [] [x] seizures   Psychiatric:   [] [] depression  [] [] hallucinations   Endocrine: [] [] excessive thirst or urination   [] [] heat or cold intolerance   Hematologic/Lymphatic: [] [] bleeding tendency  [] [] enlarged lymph nodes         Objective:     Vitals:    03/12/19 1748 03/12/19 2307 03/13/19 0223 03/13/19 0612   BP: 122/75 124/72 116/62 128/72   Pulse: 62 69 62 70   Resp: 16 16 16 16   Temp: 98.1 °F (36.7 °C) 98.8 °F (37.1 °C) 97.7 °F (36.5 °C) 97.6 °F (36.4 °C)   SpO2: 97% 98% 95% 96% Weight:       Height:            Physical Exam:   Appearance: Tearful, cooperative   Cardiovascular: Heart is regular rate and rhythm, peripheral edema is absent. No carotid bruits heard  Neurological examination:   Mental status examination: Alert and oriented times three. Normal speech, memory and language. Normal attention and fund of knowledge. Cranial nerves:   Optic nerve: vision intact bilaterally, peripheral vision is grossly normal to confrontation. Oculomotor, Trochlear and abducens nerves: papillary response to light is brisk. Saccadic intrusions with some nystagmus to lateral gazes. Trigeminal nerve: facial sensation normal bilaterally, normal movements of the jaw. Normal corneal reflexes. Facial nerve: No facial weakness was present. Vestibuloacoustic nerve: hearing is normal bilaterally. Spinal accessory nerve: shoulder shrug and head turn strength is normal.   Hypoglossal nerve: tongue movements normal.   Motor exam: Strength was 5/5 in the upper and lower extremities except 4+/5 to LLE and drift with LUE. Muscle tone was normal. No atrophy. No fasciculations. Coordination: Dysmetria BUE. Normal rapid alternating movements. Deep tendon reflexes: 2+ in bilateral biceps, triceps, brachioradialis. 3+ patellar. 2+ ankle reflexes. No Babinski responses. Gait: Widened base.  +rhomberg. Unable to toe or heel walk. Assessment/Plan:  MRI showing lesion along the callosal septal interface and multifocal involving the corpus callosum, deep white matter bilaterally, periventricular, occipital lobes, and left cerebellum that have various hyperintensities. Additionally MRI cervical shows intrinsic lesion at C2 that enhances and c4-6 that do not enhance, Thoracic shows enhancing at T12. All of this is consistent with demyelinating process that is acute and subacute. Probable MS but getting LP to rule out infectious process and help confirm MS. Solumedrol started last night. Continue Solumedrol 1 gm IV every day x 5. Will need DMT and follow-up in [address]. Discussed at length with the patient.        Hospital Problems  Never Reviewed          Codes Class Noted POA    Demyelinating disease of central nervous system ([facility] [address] ICD-10-CM: G37.9  ICD-9-CM: 341.9  3/12/2019 Yes        * (Principal) TIA (transient ischemic attack) ICD-10-CM: G45.9  ICD-9-CM: 435.9  3/11/2019 Yes        Ataxia ICD-10-CM: R27.0  ICD-9-CM: 781.3  3/11/2019 Yes                Signed By: Denae Cárdenas NP     March 13, 2019

## 2019-03-13 NOTE — PROGRESS NOTES
Pt in bed resting alert and oriented x4 denies pain at the moment. Assessement completed plan of care for the shift explained pt verbalized understanding. IVF infusing well, HOB elevated, bed low and in locked position. Call light and frequently used items within reach. [address] arrived at bedside    2300- IV line found infiltrated. New IV inserted on the right forearm gauge 18 attempt x 1 successfully. IVF infusing well.    0300- Pt sleeping. 0078- Received call from [place] to check on pt because heart rate was up to 140. Checked on pt found him out of bed standing using the urinal. Notified tele tech pt out of bed using urinal, she said heart rate back to normal. Pt back to bed made comfortable. Pt educated on the use of call light for safety . Pt verbalized understanding. No concerns voiced. 4095- Pt awake watching tv. No concerns, uneventful night. 1028- Pt given stroke education binder.

## 2019-03-13 NOTE — PROGRESS NOTES
Vascular & Interventional Radiology Brief Procedure Note    Radiologist: Magda Lubin MD    Assistant:  None    Pre-operative Diagnosis:  Ataxia with findings concerning for MS on recent MRI    Post-operative Diagnosis: Same as pre-op dx    Procedure(s) Performed:  Lumbar Puncture    Anesthesia:  Local    Findings:  Uneventful LP. L4-L5 level, 20g needle, 11 cc clear, colorless CSF. Complications: None    Estimated Blood Loss:  None. Tubes and Drains: None    Specimens: CSF taken to lab. Condition: Good    Disposition: Return to floor    Plan: Bedrest x 24 hrs.       Magda Lubin MD  [facility]

## 2019-03-13 NOTE — PROGRESS NOTES
Problem: Self Care Deficits Care Plan (Adult)  Goal: *Acute Goals and Plan of Care (Insert Text)  Occupational Therapy Goals  Initiated 3/12/2019 within 7 day(s). 1.  Patient will perform grooming tasks while standing with supervision and fair dynamic standing balance. 2.  Patient will perform lower body dressing with modified independence and fair+ dynamic sitting balance. 3.  Patient will perform functional task in standing for 8 minutes with supervision and good safety awareness in prep for ADLs. 4.  Patient will perform toilet transfers with supervision. 5.  Patient will perform all aspects of toileting with supervision/set-up. 6.  Patient will participate in upper extremity therapeutic exercise/activities for 8 minutes with supervision/set-up to maintain BUE strength for functional transfers and ADLs. 7.  Patient will utilize energy conservation techniques during functional activities with minimal verbal cues. Outcome: Progressing Towards Goal  Occupational Therapy TREATMENT    Patient: Diana Nguyen (89 y.o. male)  Date: 3/13/2019  Diagnosis: TIA (transient ischemic attack) [G45.9]  Ataxia [R27.0]  Ataxia [R27.0]  TIA (transient ischemic attack) [G45.9] Demyelinating disease of central nervous system [facility])      Precautions:    Chart, occupational therapy assessment, plan of care, and goals were reviewed. PLOF: Independent    ASSESSMENT:  Pt seated at EOB upon entry. Pt demonstrates energy conservation techniques w/LB dressing task, donning/doffing socks. Pt tolerates standing sinkside performing ADL grooming tasks. No LOB, no c/o pain. Transport arrived for LP procedure.    EDUCATION Pt educated on importance OOB  Progression toward goals:  [x]          Improving appropriately and progressing toward goals  []          Improving slowly and progressing toward goals  []          Not making progress toward goals and plan of care will be adjusted     PLAN:  Patient continues to benefit from skilled intervention to address the above impairments. Continue treatment per established plan of care. Discharge Recommendations:  Home Health  Further Equipment Recommendations for Discharge:  shower chair     SUBJECTIVE:   Patient stated I'm ready to go back to bed.     OBJECTIVE DATA SUMMARY:       Cognitive/Behavioral Status:  Neurologic State: Alert  Orientation Level: Oriented X4  Cognition: Follows commands  Safety/Judgement: Awareness of environment  Functional Mobility and Transfers for ADLs:   Bed Mobility:  Sit to Supine: Supervision   Transfers:   Bathroom Mobility: Supervision/set up  Balance:  Sitting: Intact  Standing: Intact  ADL Intervention:  Grooming  Brushing Teeth: Supervision/set-up    Lower Body Dressing Assistance  Socks: Supervision/set-up  Leg Crossed Method Used: Yes  Position Performed: Seated edge of bed  Cues: Don;Doff    Pain:  Pre Treatment:0  Post Treatment:0  Pain Scale 1: Numeric (0 - 10)  Pain Intensity 1: 0    Activity Tolerance:    Good    Please refer to the flowsheet for vital signs taken during this treatment.   After treatment:   [x]  Patient leaving w/transport for LP  []  Patient left in no apparent distress in bed  []  Call bell left within reach  [x]  Nursing notified  []  Caregiver present  []  Bed alarm activated    HALLE Bryson  Time Calculation: 11 mins

## 2019-03-13 NOTE — PROGRESS NOTES
Progress Note      Patient: Preethi Estevez               Sex: male          DOA: 3/11/2019       YOB: 1985      Age:  26 y.o.        LOS:  LOS: 2 days             CHIEF COMPLAINT:  Dizziness and Numbness      Subjective:     MRI shows demyelinating lesions in brain, c-spine and t-spine, per Neuro very likely MS. Started on solumedrol last night. Pt and mother with many questions , discussed at length    Objective:      Visit Vitals  /70 (BP 1 Location: Left arm, BP Patient Position: At rest)   Pulse 66   Temp 97.6 °F (36.4 °C)   Resp 16   Ht 5' 10\" (1.778 m)   Wt 65.8 kg (145 lb)   SpO2 97%   BMI 20.81 kg/m²       Physical Exam:  Physical Exam   Constitutional: He is oriented to person, place, and time and well-developed, well-nourished, and in no distress. HENT:   Head: Normocephalic and atraumatic. Eyes: Conjunctivae and EOM are normal. Pupils are equal, round, and reactive to light. Neck: Normal range of motion. Cardiovascular: Normal rate, regular rhythm and normal heart sounds. Pulmonary/Chest: Effort normal and breath sounds normal.   Abdominal: Soft. Bowel sounds are normal.   Musculoskeletal: Normal range of motion. Neurological: He is alert and oriented to person, place, and time. Skin: Skin is warm and dry. Nursing note and vitals reviewed. Lab/Data Reviewed:  BMP:   No results found for: NA, K, CL, CO2, AGAP, GLU, BUN, CREA, GFRAA, GFRNA  CBC:   No results found for: WBC, HGB, HGBEXT, HCT, HCTEXT, PLT, PLTEXT, HGBEXT, HCTEXT, PLTEXT    Imaging Reviewed:  [address] Wo Cont    Result Date: 3/12/2019  EXAM: MRI brain without and with gadolinium CLINICAL INDICATION/HISTORY: TIA   > Additional: Ataxia COMPARISON: None. > Reference Exam: Correlation CT head 3/11/2019 TECHNIQUE: Sagittal FLAIRT1, T2 FLAIR, axial T1, T2, FLAIR, susceptibility weighted, and diffusion sequences obtained of the brain.  Following gadolinium administration, axial and coronal T1 sequences obtained. Imaging performed on wide bore Discovery AV247k GEM suite 3T magnet at [facility]. _______________ FINDINGS: Diffusion:  Multifocal areas of periventricular and deep white matter increased diffusion signal, with small focus involving left corona radiata which appears to have associated low ADC map. No other evidence of restricted diffusion signal. Brain parenchyma: There is extensive abnormal FLAIR hyperintensity callosal septal interface and multifocal involving the corpus callosum. There is extensive confluent and multifocal increased T2 and FLAIR signal periventricular and deep cerebral white matter with numerous perpendicular oriented radiating periventricular lesions. Additional areas of subcortical white matter FLAIR hyperintensity most pronounced bilateral parietal lobes left greater than right. Additional subcortical lesions involving occipital lobes. Many of these lesions have associated low T1 signal and many of these lesions have associated enhancement including numerous periventricular lesions and subcortical lesions. There is enhancing lesion superior left cerebellum and small with no other infratentorial enhancing lesions. Additional lesion involving medial right basal ganglia, medial left basal ganglia, left midbrain, lateral right upper syed, and left cerebellar deep white matter. There is no evidence of significant mass effect or volume loss. No evidence of hemorrhage. Ventricles and sulci:  Normal.  No significant atrophy given age. Extra-axial:  No extra-axial fluid collection or mass is noted. Contrast:As above. Brain vasculature:  No vascular abnormality is appreciated on this routine brain MR examination. Craniocervical junction:  Normal. Skull base, extracranial and calvarium:  Orbits paranasal sinuses, IACs unremarkable. Minimal increased T2 signal bilateral mastoids without coalescence. Partial empty sella. No skull abnormality. , _______________     IMPRESSION: 1. Extensive mostly supratentorial white matter lesions with a typical pattern of demyelinating process such as multiple sclerosis. Many of these lesions show associated enhancement suggesting more active areas of demyelination. Additional involvement brainstem and mildly involving cerebellum, with single enhancing lesion superior left cerebellum. 2. Many of these supratentorial lesions have associated T1 hypointensity. Mri Cerv Spine W Wo Cont    Result Date: 3/12/2019  EXAM: MRI cervical spine with gadolinium CLINICAL INDICATION/HISTORY: ataxia   > Additional: None. COMPARISON: Correlation brain MRI same day   > Reference Exam: None. TECHNIQUE: Sagittal FLAIRT1, FSE proton density and T2, and STIR sequences, and axial spin echo T1, T2 and volume 3-D T2*GRE sequences were obtained of the cervical spine without gadolinium. Following gadolinium administration, sagittal and axial T1 sequences obtained. _______________ FINDINGS: There is slight degenerative retrolisthesis C4/5 with otherwise normal alignment with normal vertebral body height with no evidence of fracture. No abnormal marrow signal. Visualized base of brain and soft tissues of neck unremarkable. There is a longitudinally oriented lesion within the cord at the C2 level with small dorsal bandlike associated enhancement without cord enlargement. Several additional small foci of T2 and STIR cord hyperintensity noted C4 and C5 and C6 levels with no evidence of cord enlargement or other cord enhancement. C2/3 level: Unremarkable. C3/4 level: Unremarkable. C4/5 level: Degenerative disc changes with minimal disc bulge with slight cord flattening but no high-grade stenosis. C5/6 level: Mild degenerative spondylosis without stenosis. C6/7 level: Small broad right posterior and foraminal disc protrusion with mild right foraminal stenosis with no high-grade canal stenosis. C7/T1 level: Unremarkable. Contrast:As above. _______________     IMPRESSION: 1.  C2 intrinsic cord lesion with small associated enhancement without cord enlargement, with several additional nonenhancing small cord lesions C4-C6. Given findings within the brain, findings likely related to areas of demyelination and multiple sclerosis. Enhancing C2 lesion suggesting more acute area of demyelination. 2. Multilevel mild degenerative spondylosis and small disc protrusions as above with no high-grade stenosis. Mri Thorac Spine W Wo Cont    Result Date: 3/12/2019  EXAM: Thoracic spine MRI with gadolinium CLINICAL INDICATION/HISTORY: T/L-spine trauma, significant injury suspected, neurologic deficits   > Additional: Ataxia COMPARISON: Correlation brain and cervical spine MRI same day   > Reference Exam: None. TECHNIQUE: Sagittal spin echo T1, STEPAN T2 and proton density sequences, sagittal STIR sequences, and selected angled axial sequences through the discs with spin echo T1 and T2 sequences were obtained of the thoracic spine. Post gadolinium sagittal and axial T1 weighted sequences were also obtained. _______________ FINDINGS: There is normal alignment with normal vertebral body height with no evidence of fracture or abnormal marrow signal. No thoracic disc abnormality or stenosis. There is longitudinally oriented cord lesion involving the T12 cord eccentrically posteriorly with associated enhancement without significant cord enlargement. No other definite intrinsic cord lesion. _______________     IMPRESSION: 1. Enhancing intrinsic cord lesion T12 without cord enlargement. Given additional findings and brain and cervical cord, findings likely related to area of demyelination in this patient with likely multiple sclerosis. Enhancement suggests more acute area of demyelination. 2. No other significant finding. Assessment:     Principal Problem:    Demyelinating disease of central nervous system ([facility] [address] (3/12/2019)    Active Problems:    Ataxia (3/11/2019)        PLAN:  · MRI c/w MS. IV solumedrol per Neuro. LP today. · Appreciate Neuro assistance  · Cont acceptable home medications for chronic conditions  · DVT protocol    I have personally reviewed all pertinent labs and films that have officially resulted over the last 24 hours. I have personally checked for all pending labs that are awaiting final results.       Signed By: Mary Cavazos MD     March 13, 2019

## 2019-03-13 NOTE — ROUTINE PROCESS
Bedside and Verbal shift change report given to Ninoska Pablo RN (oncoming nurse) by Melvin Hutchison RN ( off going Nurse ) inclusive of SBAR and plan of care, Intake & Output.

## 2019-03-13 NOTE — PROGRESS NOTES
Problem: Mobility Impaired (Adult and Pediatric)  Goal: *Acute Goals and Plan of Care (Insert Text)  Physical Therapy Goals   Initiated 3/12/2019 and to be accomplished within 7 days. 1.  Patient will complete all bed mobility with independence in order to prepare for EOB/OOB activity. 2.  Patient will perform sit <> stand with independence in order to prepare for OOB/gait activity. 3.  Patient will perform bed to chair transfers with independence in order to promote mobility and encourage seated activity to progress towards their prior level of function. 4.  Patient will ambulate 500 feet with independence using LRAD in order to prepare for safe negotiation of their environment. 5.  Patient will ascend/descend 1 steps with S handrail use with independence in order to prepare for safe exit/entry into their home environment. Outcome: Progressing Towards Goal    PHYSICAL THERAPY: Daily TREATMENT Note   INPATIENT: Medicaid: Hospital Day: 3     Patient: Sajan Mercado (26 y.o. male)    Date: 3/13/2019  Primary Diagnosis: TIA (transient ischemic attack) [G45.9]  Ataxia [R27.0]  Ataxia [R27.0]  TIA (transient ischemic attack) [G45.9]   ,  ,   Precautions:      Chart, physical therapy assessment, plan of care and goals were reviewed. PLOF:Independent    ASSESSMENT:  Pt pleasant, cooperative mod I for bed mobility, transfers, gait CGA X 200 ft with verbal cues for pacing. Pt amb with accelerated pace, path deviations and decreased step clearance on R, instructed with verbal cues for pacing and safety, educated on possible use of AD such as SPC for safety at home. Progression toward goals:      [x]  Improving appropriately and progressing toward goals      []  Improving slowly and progressing toward goals      [] Not making progress toward goals and plan of care will be adjusted     PLAN:  Patient continues to benefit from skilled intervention to address the above impairments.   Continue treatment per established plan of care. EDUCATION:   Education:  Patient was educated on the following topics: pacing, safety    Barriers to Learning/Limitations: None  Compensate with: visual, verbal, tactile, kinesthetic cues/model    Discharge Recommendations:  home health or outpatient  Further Equipment Recommendations for Discharge:  straight cane  Factors which may impact discharge planning: testing outcomes     SUBJECTIVE:   Patient stated I'm frustrated, its a lot right now.     OBJECTIVE DATA SUMMARY:   Critical Behavior:  Neurologic State: Alert  Orientation Level: Oriented X4  Cognition: Follows commands  Safety/Judgement: Awareness of environment      Functional Mobility:      Functional Status      Indep   (I)   Mod I   Super-vision   Min A   Mod A   Max A   Total A   Assist x2 Verbal cues Additional time Not tested   Comments   Rolling []  [x]  [] []    []    []  []  [] [] [] []    Supine to sit []  [x]  [] []  []  []  []  [] [] [] []    Sit to supine []  [x]  [] []  []  []  []  [] [] [] []    Sit to stand []  [x]  [] []  []  []  []  [] [] [] []    Stand to sit []  [x]  [] []  []  []  []  [] [] [] []    Bed to chair transfers []  []  [x]CGA []  []  []  []  [] [x] [] []        Balance    Good   Fair   Poor   Unable   Not tested   Comments   Sitting static [x]  []  []  []  []    Sitting dynamic [x]  []  []  []  []    Standing static [x]  []  []  []  []    Standing dynamic [x]  []  []  []  []      Mobility/Gait:    Level of Assistance: Contact guard assistance  Assistive Device: none  Distance Ambulated: 200 feet       Base of Support: narrowed  Speed/Shahnaz: accelerated and fluctuations  Step Length: WFL  Swing Pattern: WFL  Stance: WFL  Gait Abnormalities: path deviations    Stairs:   Level of Assistance: Not assessed at this time      Vital Signs  Temp: 97.6 °F (36.4 °C)     Pulse (Heart Rate): 70     BP: 128/72     Resp Rate: 16     O2 Sat (%): 96 %  Pain:  Pre treatment pain level:0  Post treatment pain level:0  Pain Scale 1: Numeric (0 - 10)  Pain Intensity 1: 0              Activity Tolerance:   Good      After treatment:   []  Patient left in no apparent distress sitting up in chair  [x]  Patient left in no apparent distress in bed  [x]  Call bell left within reach  [x]  Nursing notified  []  Caregiver present  []  Bed alarm activated      Ren Self PTA   Time Calculation: 14 mins

## 2019-03-13 NOTE — PROGRESS NOTES
Problem: Falls - Risk of  Goal: *Absence of Falls  Document Basim Fall Risk and appropriate interventions in the flowsheet.   Outcome: Progressing Towards Goal  Fall Risk Interventions:  Mobility Interventions: Communicate number of staff needed for ambulation/transfer, Patient to call before getting OOB, PT Consult for mobility concerns, Strengthening exercises (ROM-active/passive)         Medication Interventions: Assess postural VS orthostatic hypotension, Evaluate medications/consider consulting pharmacy, Patient to call before getting OOB, Teach patient to arise slowly         History of Falls Interventions: Evaluate medications/consider consulting pharmacy, Room close to nurse's station, Door open when patient unattended

## 2019-03-13 NOTE — PROGRESS NOTES
200- Answered a call from pharmacy, pharmacist said that he has discontinued he old order of solumedrol and will send  1  Gram in 250 ML NS bag  IV to be infused in 1 hr. Med scheduled for 2300. Primary nurse Nicolle informed.

## 2019-03-14 LAB
ACE SERPL-CCNC: 29 U/L (ref 14–82)
ANA TITR SER IF: NEGATIVE {TITER}
B BURGDOR IGG+IGM SER-ACNC: <0.91 ISR (ref 0–0.9)
COPPER SERPL-MCNC: 101 UG/DL (ref 72–166)
CRP SERPL HS-MCNC: 0.47 MG/L (ref 0–3)
ECHO LV INTERNAL DIMENSION DIASTOLIC: 4.14 CM (ref 4.2–5.9)
ECHO LV INTERNAL DIMENSION SYSTOLIC: 2.6 CM
ECHO LV IVSD: 0.99 CM (ref 0.6–1)
ECHO LV MASS 2D: 142.7 G (ref 88–224)
ECHO LV MASS INDEX 2D: 78.4 G/M2 (ref 49–115)
ECHO LV POSTERIOR WALL DIASTOLIC: 0.92 CM (ref 0.6–1)
ECHO MV A VELOCITY: 74.13 CM/S
ECHO MV AREA PHT: 3.1 CM2
ECHO MV E DECELERATION TIME (DT): 245.5 MS
ECHO MV E VELOCITY: 92.62 CM/S
ECHO MV E/A RATIO: 1.25
ECHO MV PRESSURE HALF TIME (PHT): 71.2 MS
ENA SS-A AB SER-ACNC: <0.2 AI (ref 0–0.9)
ENA SS-B AB SER-ACNC: <0.2 AI (ref 0–0.9)
PLEASE NOTE, 734348: NORMAL
REAGIN AB CSF QL: NON REACTIVE

## 2019-03-14 PROCEDURE — 74011250637 HC RX REV CODE- 250/637: Performed by: FAMILY MEDICINE

## 2019-03-14 PROCEDURE — 74011250637 HC RX REV CODE- 250/637: Performed by: HOSPITALIST

## 2019-03-14 PROCEDURE — 74011250636 HC RX REV CODE- 250/636: Performed by: NURSE PRACTITIONER

## 2019-03-14 PROCEDURE — 65660000000 HC RM CCU STEPDOWN

## 2019-03-14 RX ADMIN — ACETAMINOPHEN 650 MG: 325 TABLET, FILM COATED ORAL at 20:30

## 2019-03-14 RX ADMIN — PANTOPRAZOLE SODIUM 40 MG: 40 TABLET, DELAYED RELEASE ORAL at 22:12

## 2019-03-14 RX ADMIN — Medication 100 MG: at 08:25

## 2019-03-14 RX ADMIN — SODIUM CHLORIDE 1000 MG: 900 INJECTION, SOLUTION INTRAVENOUS at 22:12

## 2019-03-14 RX ADMIN — FOLIC ACID 1 MG: 1 TABLET ORAL at 08:25

## 2019-03-14 RX ADMIN — PANTOPRAZOLE SODIUM 40 MG: 40 TABLET, DELAYED RELEASE ORAL at 08:24

## 2019-03-14 RX ADMIN — ACETAMINOPHEN 650 MG: 325 TABLET, FILM COATED ORAL at 09:06

## 2019-03-14 NOTE — PROGRESS NOTES
conducted an initial consultation and Spiritual Assessment for Omid Turk, who is a 26 y. o.,male. Patients Primary Language is: [place]. According to the patients EMR Islam Affiliation is: No Rastafarian. The reason the Patient came to the hospital is:   Patient Active Problem List    Diagnosis Date Noted    Demyelinating disease of central nervous system ([facility] [address] 03/12/2019    TIA (transient ischemic attack) 03/11/2019    Ataxia 03/11/2019        The  provided the following Interventions:  Initiated a relationship of care and support. Explored issues of vinicio, spirituality and/or Protestant needs while hospitalized. Listened empathically. Provided chaplaincy education. Provided information about Spiritual Care Services. Offered prayer and assurance of continued prayers on patient's behalf. Chart reviewed. The following outcomes were achieved:  Patient shared some information about their medical narrative and spiritual journey/beliefs. Patient processed feeling about current hospitalization. Patient expressed gratitude for the 's visit. Assessment:  Patient did not indicate any spiritual or Protestant issues which require Spiritual Care Services interventions at this time. Patient does not have any Protestant/cultural needs that will affect patients preferences in health care. Plan:  Chaplains will continue to follow and will provide pastoral care on an as needed or requested basis.  recommends bedside caregivers page  on duty if patient shows signs of acute spiritual or emotional distress.     [address] [facility]   Staff [address] [facility] [place]   [phone]

## 2019-03-14 NOTE — PROGRESS NOTES
Progress Note      Patient: Maurilio Bryant               Sex: male          DOA: 3/11/2019       YOB: 1985      Age:  26 y.o.        LOS:  LOS: 3 days             CHIEF COMPLAINT:  Dizziness and Numbness      Subjective:     Pt c/o back pain s/p LP and HA. He said dizziness has resolved. Objective:      Visit Vitals  /75 (BP 1 Location: Left arm, BP Patient Position: At rest)   Pulse 99   Temp 97.4 °F (36.3 °C)   Resp 16   Ht 5' 10\" (1.778 m)   Wt 65.8 kg (145 lb)   SpO2 100%   BMI 20.81 kg/m²       Physical Exam:  Physical Exam   Constitutional: He is oriented to person, place, and time and well-developed, well-nourished, and in no distress. HENT:   Head: Normocephalic and atraumatic. Eyes: Conjunctivae and EOM are normal. Pupils are equal, round, and reactive to light. Neck: Normal range of motion. Cardiovascular: Normal rate, regular rhythm and normal heart sounds. Pulmonary/Chest: Effort normal and breath sounds normal.   Abdominal: Soft. Bowel sounds are normal.   Musculoskeletal: Normal range of motion. Neurological: He is alert and oriented to person, place, and time. Skin: Skin is warm and dry. Nursing note and vitals reviewed. Lab/Data Reviewed:  BMP:   No results found for: NA, K, CL, CO2, AGAP, GLU, BUN, CREA, GFRAA, GFRNA  CBC:   No results found for: WBC, HGB, HGBEXT, HCT, HCTEXT, PLT, PLTEXT, HGBEXT, HCTEXT, PLTEXT    Imaging Reviewed:  Xr Spinal Punc Lumb Dx    Result Date: 3/13/2019  FLUOROSCOPIC GUIDED LUMBAR PUNCTURE: CLINICAL INDICATION: Ataxia, with abnormal findings concerning for multiple sclerosis on prior brain MRI COMPARISON: Brain MRI and cervical spine MRI 3/12/2019 After the procedure as well as its risks, benefits and alternatives was explained to the patient, and all questions answered, written informed consent was obtained. A timeout was performed at 1:32 PM to confirm correct patient, procedure, positioning, and precautions.  Examination performed with standard aseptic technique. Using fluoroscopy for guidance a lumbar puncture was performed at the L3-L4 level with a 20 gauge spinal needle after local anesthesia. CSF was clear and colorless. CSF was withdrawn for the requested laboratory evaluation. Total of 11 mL of CSF was obtained. Standard post procedure pause. Patient tolerated the procedure well and there were no immediate complications. GUIDANCE: Fluoroscopy guidance was used to position (and confirm the position of) the needle. Image(s) saved in PACS: Fluoroscopy Radiation dose (reference air kerma): 3.8 mGy     Impression: =========== Successful fluoroscopic guided diagnostic lumbar puncture. Assessment:     Principal Problem:    Demyelinating disease of central nervous system ([facility] [address] (3/12/2019)    Active Problems:    Ataxia (3/11/2019)        PLAN:  · MRI c/w MS. IV solumedrol for 5 days per Neuro. · F/u CSF results  · Appreciate Neuro assistance  · Cont acceptable home medications for chronic conditions  · DVT protocol    I have personally reviewed all pertinent labs and films that have officially resulted over the last 24 hours. I have personally checked for all pending labs that are awaiting final results.       Signed By: Juan C Marion MD     March 14, 2019

## 2019-03-14 NOTE — PROGRESS NOTES
Problem: Falls - Risk of  Goal: *Absence of Falls  Document Basim Fall Risk and appropriate interventions in the flowsheet. Outcome: Progressing Towards Goal  Fall Risk Interventions:  Mobility Interventions: Patient to call before getting OOB         Medication Interventions: Patient to call before getting OOB    Elimination Interventions: Call light in reach, Patient to call for help with toileting needs, Toileting schedule/hourly rounds, Urinal in reach    History of Falls Interventions:  Investigate reason for fall

## 2019-03-14 NOTE — PROGRESS NOTES
Pt on PT hold today; pt s/p lumbar puncture 3/13, per physician documentation pt on 24 hour bedrest. Will resume with POC as appropriate.  Marcy Lara, PTA

## 2019-03-14 NOTE — PROGRESS NOTES
Problem: Falls - Risk of  Goal: *Absence of Falls  Document Basim Fall Risk and appropriate interventions in the flowsheet.   Outcome: Progressing Towards Goal  Fall Risk Interventions:  Mobility Interventions: Patient to call before getting OOB         Medication Interventions: Patient to call before getting OOB    Elimination Interventions: Call light in reach    History of Falls Interventions: Door open when patient unattended, Investigate reason for fall

## 2019-03-14 NOTE — PROGRESS NOTES
Patient received in bed awake. Patient alert and oriented X4, denies pain and discomfort. Patient resting quietly. Frequent use items within reach. Bed locked in low position. Call bell within reach and patient verbalized understanding of use for assistance and needs. Dual NIHSS and skin assessment conducted with Fabienne Councilman, RN. Skin is intact, no skin breakdown noted.

## 2019-03-14 NOTE — ROUTINE PROCESS
Bedside and Verbal shift change report given to [address] (oncoming nurse) by Jason West RN   (offgoing nurse). Report included the following information SBAR, Kardex, MAR and Recent Results.

## 2019-03-14 NOTE — PROGRESS NOTES
Neurology Progress Note      Patient: Apple Brown MRN: [id number]  SSN: [id number]    YOB: 1985  Age: 26 y.o. Sex: male      Subjective:   States doing better. Asking about LP results. States vision less blurry and feels stronger. History reviewed. No pertinent past medical history. History reviewed. No pertinent surgical history. History reviewed. No pertinent family history.   Social History     Tobacco Use    Smoking status: Never Smoker    Smokeless tobacco: Never Used   Substance Use Topics    Alcohol use: Yes     Comment: occ      Current Facility-Administered Medications   Medication Dose Route Frequency Provider Last Rate Last Dose    folic acid (FOLVITE) tablet 1 mg  1 mg Oral DAILY Parish Cardenas MD   1 mg at 03/14/19 0825    thiamine mononitrate (B-1) tablet 100 mg  100 mg Oral DAILY Parish Cardenas MD   100 mg at 03/14/19 0825    dextrose 5% and 0.9% NaCl infusion  0.75 mL/kg/hr IntraVENous CONTINUOUS Vince Benson MD 49.4 mL/hr at 03/12/19 0130 0.75 mL/kg/hr at 03/12/19 0130    ondansetron (ZOFRAN) injection 2 mg  2 mg IntraVENous Q6H PRN Vince Benson MD        acetaminophen (TYLENOL) tablet 650 mg  650 mg Oral Q4H PRN Vince Benson MD        acetaminophen (TYLENOL) suppository 650 mg  650 mg Rectal Q4H PRN Vince Benson MD        senna-docusate (PERICOLACE) 8.6-50 mg per tablet 2 Tab  2 Tab Oral QHS Vince Benson MD   2 Tab at 03/13/19 2150    albuterol (PROVENTIL VENTOLIN) nebulizer solution 2.5 mg  2.5 mg Nebulization Q4H PRN Vince Benson MD        acetaminophen (TYLENOL) tablet 650 mg  650 mg Oral Q4H PRN Vince Benson MD   650 mg at 03/14/19 0906    pantoprazole (PROTONIX) tablet 40 mg  40 mg Oral Q12H Vince Benson MD   40 mg at 03/14/19 0824    dextrose 5 % - 0.45% NaCl infusion  75 mL/hr IntraVENous CONTINUOUS Parish Cardenas MD 75 mL/hr at 03/13/19 2151 75 mL/hr at 03/13/19 2151    methylPREDNISolone ((Solu-MEDROL) 1,000 mg in 0.9% sodium chloride 250 mL IVPB  1,000 mg IntraVENous Q24H Sherie Vance MD   1,000 mg at 03/13/19 2300        No Known Allergies    Labs:    Recent Results (from the past 24 hour(s))   PROTEIN, CSF    Collection Time: 03/13/19  1:30 PM   Result Value Ref Range    Tube No. 1      Protein,CSF 36 15 - 45 MG/DL   GLUCOSE, CSF    Collection Time: 03/13/19  2:00 PM   Result Value Ref Range    Tube No. 1      Glucose,CSF 84 (H) 40 - 70 MG/DL   CULTURE, CSF W GRAM STAIN    Collection Time: 03/13/19  2:00 PM   Result Value Ref Range    Special Requests: TUBE 2, CULTURE AND SENSITIVTY AND GRAM STAIN. GRAM STAIN NO WBC'S SEEN      GRAM STAIN NO ORGANISMS SEEN      Culture result: PENDING    CRYPTOCOCCAL AG, CSF W/REFLEX TITER    Collection Time: 03/13/19  2:00 PM   Result Value Ref Range    Cryptococcus Ag, CSF NEGATIVE      MULTIPLE SCLEROSIS PANEL    Collection Time: 03/13/19  2:00 PM   Result Value Ref Range    IgG, Quant, CSF PENDING mg/dL    Albumin, CSF PENDING mg/dL    Albumin, serum 4.5 3.5 - 5.5 g/dL    Immunoglobulin G, Qt. 1,357 700 - 1,600 mg/dL    IgG/Alb ratio, CSF PENDING     CSF IgG Index PENDING     IgG Synthesis Rate, CSF PENDING mg/day    Oligoclonal Bands PENDING     CSF/Serum Alb.  Index PENDING    GLUCOSE, POC    Collection Time: 03/13/19  4:35 PM   Result Value Ref Range    Glucose (POC) 138 (H) 70 - 110 mg/dL   ECHO ADULT COMPLETE    Collection Time: 03/13/19  7:33 PM   Result Value Ref Range    LVIDd 4.14 (A) 4.2 - 5.9 cm    LVPWd 0.92 0.6 - 1.0 cm    LVIDs 2.60 cm    IVSd 0.99 0.6 - 1.0 cm    MVA (PHT) 3.1 cm2    MV A [place] 74.13 cm/s    MV E [place] 92.62 cm/s    MV E/A 1.25     LV Mass .7 88 - 224 g    LV Mass AL Index 78.4 49 - 115 g/m2    Mitral Valve E Wave Deceleration Time 245.5 ms    Mitral Valve Pressure Half-time 71.2 ms   GLUCOSE, POC    Collection Time: 03/13/19  9:38 PM   Result Value Ref Range    Glucose (POC) 122 (H) 70 - 110 mg/dL       Testing:  CT Results (most recent):  Results from [place] encounter on 03/11/19   CT HEAD WO CONT         Impression IMPRESSION:    1. No evidence for acute cortical infarct, hemorrhage, or mass effect. CT can be  negative in acute setting. 2. Mild generalized atrophy and nonspecific white matter hypodensities. Differential considerations include demyelinating process or small vessel  ischemic changes. Clinical correlation and correlation with MRI should be  considered for possible multiple sclerosis. MRI Results (most recent):  Results from [place] encounter on 03/11/19   MRI [facility] SPINE W WO CONT         Impression IMPRESSION:  1. Enhancing intrinsic cord lesion T12 without cord enlargement. Given  additional findings and brain and cervical cord, findings likely related to area  of demyelination in this patient with likely multiple sclerosis. Enhancement  suggests more acute area of demyelination. 2. No other significant finding. MRI Cervical Spine     1. C2 intrinsic cord lesion with small associated enhancement without cord  enlargement, with several additional nonenhancing small cord lesions C4-C6. Given findings within the brain, findings likely related to areas of  demyelination and multiple sclerosis. Enhancing C2 lesion suggesting more acute  area of demyelination.      2. Multilevel mild degenerative spondylosis and small disc protrusions as above  with no high-grade stenosis. MRI Brain  1. Extensive mostly supratentorial white matter lesions with a typical pattern  of demyelinating process such as multiple sclerosis. Many of these lesions show  associated enhancement suggesting more active areas of demyelination. Additional  involvement brainstem and mildly involving cerebellum, with single enhancing  lesion superior left cerebellum.     2. Many of these supratentorial lesions have associated T1   hypointensity.     Review of Systems:    Y  N   Constitutional: [] [x] recent weight change  [] [x] fever  [] [x] sleep difficulties   ENT/Mouth:  [] [x] hearing loss  [] [x] swallowing problems  [] [x] slurred speech   Cardiovascular:  [] [x] chest pain   [] [x] palpitations    Respiratory: [] [x] cough with swallow  [] [] shortness of breath  [] [] sleep apnea  [] [] intubated   Gastrointestinal: [] [x] abdominal pain  [] [] nausea   Genitourinary: [] [x] frequent urination  [] [] incontinence    Musculoskeletal:   [] [x] joint pain  [] [x] muscle pain   Integument:   [] [] rash/itching   Neurological:  [x] [] dizziness/vertigo  [] [] sedation  [] [] confusion  [] [] agitation/combativeness  [] [x] loss of consciousness  [] [x] numbness/tingling sensation  [] [x] tremors  [] [x] weakness in limbs  [x] [] difficulty with balance  [] [x] frequent or recurring headaches  [] [x] memory loss   [] [] comatose  [] [x] seizures   Psychiatric:   [] [] depression  [] [] hallucinations   Endocrine: [] [] excessive thirst or urination   [] [] heat or cold intolerance   Hematologic/Lymphatic: [] [] bleeding tendency  [] [] enlarged lymph nodes         Objective:     Vitals:    03/13/19 2210 03/14/19 0239 03/14/19 0642 03/14/19 1042   BP: 143/66 104/51 127/75 128/73   Pulse: 69 74 99 72   Resp: 16 16 16 14   Temp: 97.6 °F (36.4 °C) 97.9 °F (36.6 °C) 97.4 °F (36.3 °C) 98.2 °F (36.8 °C)   SpO2: 96% 92% 100% 97%   Weight:       Height:            Physical Exam:   Appearance: Alert, cooperative  Neurological examination:   Mental status examination: Alert and oriented times three. Normal speech, memory and language. Normal attention and fund of knowledge. Cranial nerves:   Optic nerve: vision intact bilaterally, peripheral vision is grossly normal to confrontation. Oculomotor, Trochlear and abducens nerves: papillary response to light is brisk. Saccadic intrusions with some nystagmus to lateral gazes. Motor exam: Strength was 5/5 in the upper and lower extremities except 4+/5 to LLE and drift with LUE. Muscle tone was normal. No atrophy. No fasciculations. Coordination: Dysmetria BUE. Normal rapid alternating movements. Gait: Widened base.  +rhomberg. Unable to toe or heel walk. Assessment/Plan:  MRI showing lesion along the callosal septal interface and multifocal involving the corpus callosum, deep white matter bilaterally, periventricular, occipital lobes, and left cerebellum that have various hyperintensities. Additionally MRI cervical shows intrinsic lesion at C2 that enhances and c4-6 that do not enhance, Thoracic shows enhancing at T12. All of this is consistent with demyelinating process that is acute and subacute. LP to rule out infectious process and help confirm MS. MS panel, ACE, CMV, Lyme, HASV by PCR, Sjogren's, syhpilis, VDRL, and varicella pending. Copper, heavy metal, and thiamine normal.      Solumedrol IV every day x 5. Ends Saturday. Will need DMT and follow-up in [address]. Is going to need Medicaid. CM to see pt. Discussed at length with the patient.        Hospital Problems  Never Reviewed          Codes Class Noted POA    * (Principal) [facility] ICD-10-CM: G37.9  ICD-9-CM: 341.9  3/12/2019 Yes        Ataxia ICD-10-CM: R27.0  ICD-9-CM: 781.3  3/11/2019 Yes                Signed By: Vin Miller NP     March 14, 2019

## 2019-03-14 NOTE — PROGRESS NOTES
Bedside and Verbal shift change report given to Javi Alvarez RN  RN (oncoming nurse) by Kian Ramirez RN (offgoing nurse). Report included the following information SBAR, Kardex, Intake/Output, MAR and Recent Results.

## 2019-03-15 LAB
ALB CSF/SERPL: 3 {RATIO} (ref 0–8)
ALBUMIN CSF-MCNC: 15 MG/DL (ref 11–48)
ALBUMIN SERPL-MCNC: 4.5 G/DL (ref 3.5–5.5)
IGG CSF-MCNC: 5.3 MG/DL (ref 0–8.6)
IGG SERPL-MCNC: 1357 MG/DL (ref 700–1600)
IGG SYNTH RATE SER+CSF CALC-MRATE: 11.1 MG/DAY
IGG/ALB CLEAR SER+CSF-RTO: 1.2 (ref 0–0.7)
IGG/ALB CSF: 0.35 {RATIO} (ref 0–0.25)
OLIGOCLONAL BANDS.IT SER+CSF QL: ABNORMAL

## 2019-03-15 PROCEDURE — 74011250637 HC RX REV CODE- 250/637: Performed by: HOSPITALIST

## 2019-03-15 PROCEDURE — 97535 SELF CARE MNGMENT TRAINING: CPT

## 2019-03-15 PROCEDURE — 74011250637 HC RX REV CODE- 250/637: Performed by: FAMILY MEDICINE

## 2019-03-15 PROCEDURE — 65660000000 HC RM CCU STEPDOWN

## 2019-03-15 PROCEDURE — 74011250636 HC RX REV CODE- 250/636: Performed by: NURSE PRACTITIONER

## 2019-03-15 PROCEDURE — 97116 GAIT TRAINING THERAPY: CPT

## 2019-03-15 PROCEDURE — 74011250636 HC RX REV CODE- 250/636: Performed by: FAMILY MEDICINE

## 2019-03-15 RX ORDER — KETOROLAC TROMETHAMINE 15 MG/ML
15 INJECTION, SOLUTION INTRAMUSCULAR; INTRAVENOUS ONCE
Status: COMPLETED | OUTPATIENT
Start: 2019-03-15 | End: 2019-03-15

## 2019-03-15 RX ADMIN — PANTOPRAZOLE SODIUM 40 MG: 40 TABLET, DELAYED RELEASE ORAL at 22:29

## 2019-03-15 RX ADMIN — FOLIC ACID 1 MG: 1 TABLET ORAL at 08:23

## 2019-03-15 RX ADMIN — SENNOSIDES AND DOCUSATE SODIUM 2 TABLET: 8.6; 5 TABLET ORAL at 22:29

## 2019-03-15 RX ADMIN — SODIUM CHLORIDE 1000 MG: 900 INJECTION, SOLUTION INTRAVENOUS at 22:30

## 2019-03-15 RX ADMIN — ACETAMINOPHEN 650 MG: 325 TABLET, FILM COATED ORAL at 12:22

## 2019-03-15 RX ADMIN — KETOROLAC TROMETHAMINE 15 MG: 15 INJECTION, SOLUTION INTRAMUSCULAR; INTRAVENOUS at 03:16

## 2019-03-15 RX ADMIN — ONDANSETRON 2 MG: 2 INJECTION INTRAMUSCULAR; INTRAVENOUS at 23:44

## 2019-03-15 RX ADMIN — PANTOPRAZOLE SODIUM 40 MG: 40 TABLET, DELAYED RELEASE ORAL at 08:23

## 2019-03-15 RX ADMIN — ACETAMINOPHEN 650 MG: 325 TABLET, FILM COATED ORAL at 08:23

## 2019-03-15 RX ADMIN — Medication 100 MG: at 08:23

## 2019-03-15 NOTE — PROGRESS NOTES
Problem: Falls - Risk of  Goal: *Absence of Falls  Document Basim Fall Risk and appropriate interventions in the flowsheet.   Outcome: Progressing Towards Goal  Fall Risk Interventions:  Mobility Interventions: Patient to call before getting OOB         Medication Interventions: Patient to call before getting OOB    Elimination Interventions: Call light in reach    History of Falls Interventions: Door open when patient unattended

## 2019-03-15 NOTE — PROGRESS NOTES
1920 - Assumed pt care from [facility]. Pt in bed, alert and oriented x 4. Not in any form of distress. Denies pain. Frequent use items and call bell within reach. Verbalized understanding to call for assistance. Bed locked in lowest position. 9755 - Bedside and Verbal shift change report given to Sixto RN (oncoming nurse) by Arian Paul RN (offgoing nurse). Report included the following information SBAR, Kardex, Intake/Output, MAR and Recent Results.

## 2019-03-15 NOTE — PROGRESS NOTES
Neurology Progress Note      Patient: Andressa Heaton MRN: [id number]  SSN: [id number]    YOB: 1985  Age: 26 y.o. Sex: male      Subjective:   States doing better. Asking about LP results. Has some complaints of posterior headache. Not associated with activity and denies currently. History reviewed. No pertinent past medical history. History reviewed. No pertinent surgical history. History reviewed. No pertinent family history.   Social History     Tobacco Use    Smoking status: Never Smoker    Smokeless tobacco: Never Used   Substance Use Topics    Alcohol use: Yes     Comment: occ      Current Facility-Administered Medications   Medication Dose Route Frequency Provider Last Rate Last Dose    methylPREDNISolone ((Solu-MEDROL) 1,000 mg in 0.9% sodium chloride 250 mL IVPB  1,000 mg IntraVENous Q24H Erin Richardson NP   1,000 mg at 95/63/31 5302    folic acid (FOLVITE) tablet 1 mg  1 mg Oral DAILY Crystal Freitas MD   1 mg at 03/15/19 6409    thiamine mononitrate (B-1) tablet 100 mg  100 mg Oral DAILY Crystal Freitas MD   100 mg at 03/15/19 0823    ondansetron (ZOFRAN) injection 2 mg  2 mg IntraVENous Q6H PRN Vince Benson MD        acetaminophen (TYLENOL) tablet 650 mg  650 mg Oral Q4H PRN Vince Benson MD        acetaminophen (TYLENOL) suppository 650 mg  650 mg Rectal Q4H PRN Vince Benson MD        senna-docusate (PERICOLACE) 8.6-50 mg per tablet 2 Tab  2 Tab Oral QHS Vince Benson MD   2 Tab at 03/13/19 2150    albuterol (PROVENTIL VENTOLIN) nebulizer solution 2.5 mg  2.5 mg Nebulization Q4H PRN Vince Benson MD        acetaminophen (TYLENOL) tablet 650 mg  650 mg Oral Q4H PRN Vince Benson MD   650 mg at 03/15/19 1222    pantoprazole (PROTONIX) tablet 40 mg  40 mg Oral Q12H Vince Benson MD   40 mg at 03/15/19 0823    dextrose 5 % - 0.45% NaCl infusion  75 mL/hr IntraVENous CONTINUOUS Crystal Freitas MD 75 mL/hr at 03/13/19 2151 75 mL/hr at 03/13/19 2151        No Known Allergies    Labs:    No results found for this or any previous visit (from the past 24 hour(s)). Testing:  CT Results (most recent):  Results from Hospital Encounter encounter on 03/11/19   CT HEAD WO CONT         Impression IMPRESSION:    1. No evidence for acute cortical infarct, hemorrhage, or mass effect. CT can be  negative in acute setting. 2. Mild generalized atrophy and nonspecific white matter hypodensities. Differential considerations include demyelinating process or small vessel  ischemic changes. Clinical correlation and correlation with MRI should be  considered for possible multiple sclerosis. MRI Results (most recent):  Results from [place] encounter on 03/11/19   MRI [facility] SPINE W WO CONT         Impression IMPRESSION:  1. Enhancing intrinsic cord lesion T12 without cord enlargement. Given  additional findings and brain and cervical cord, findings likely related to area  of demyelination in this patient with likely multiple sclerosis. Enhancement  suggests more acute area of demyelination. 2. No other significant finding. MRI Cervical Spine     1. C2 intrinsic cord lesion with small associated enhancement without cord  enlargement, with several additional nonenhancing small cord lesions C4-C6. Given findings within the brain, findings likely related to areas of  demyelination and multiple sclerosis. Enhancing C2 lesion suggesting more acute  area of demyelination.      2. Multilevel mild degenerative spondylosis and small disc protrusions as above  with no high-grade stenosis. MRI Brain  1. Extensive mostly supratentorial white matter lesions with a typical pattern  of demyelinating process such as multiple sclerosis. Many of these lesions show  associated enhancement suggesting more active areas of demyelination.  Additional  involvement brainstem and mildly involving cerebellum, with single enhancing  lesion superior left cerebellum.     2. Many of these supratentorial lesions have associated T1   hypointensity. Review of Systems:    Y  N   Constitutional: [] [x] recent weight change  [] [x] fever  [] [x] sleep difficulties   ENT/Mouth:  [] [x] hearing loss  [] [x] swallowing problems  [] [x] slurred speech   Cardiovascular:  [] [x] chest pain   [] [x] palpitations    Respiratory: [] [x] cough with swallow  [] [] shortness of breath  [] [] sleep apnea  [] [] intubated   Gastrointestinal: [] [x] abdominal pain  [] [] nausea   Genitourinary: [] [x] frequent urination  [] [] incontinence    Musculoskeletal:   [] [x] joint pain  [] [x] muscle pain   Integument:   [] [] rash/itching   Neurological:  [x] [] dizziness/vertigo  [] [] sedation  [] [] confusion  [] [] agitation/combativeness  [] [x] loss of consciousness  [] [x] numbness/tingling sensation  [] [x] tremors  [] [x] weakness in limbs  [x] [] difficulty with balance  [] [x] frequent or recurring headaches  [] [x] memory loss   [] [] comatose  [] [x] seizures   Psychiatric:   [] [] depression  [] [] hallucinations   Endocrine: [] [] excessive thirst or urination   [] [] heat or cold intolerance   Hematologic/Lymphatic: [] [] bleeding tendency  [] [] enlarged lymph nodes         Objective:     Vitals:    03/14/19 2152 03/15/19 0150 03/15/19 0550 03/15/19 0952   BP: 139/80 123/75 123/73 143/73   Pulse: 72 72 73 70   Resp: 16 16 16 15   Temp: 98.8 °F (37.1 °C) 98.2 °F (36.8 °C) 98.4 °F (36.9 °C) 98 °F (36.7 °C)   SpO2: 97% 95% 98% 97%   Weight:       Height:            Physical Exam:   Appearance: Alert, cooperative  Neurological examination:   Mental status examination: Alert and oriented times three. Normal speech, memory and language. Normal attention but decrease fund of knowledge. Cranial nerves:   Optic nerve: vision intact bilaterally, peripheral vision is grossly normal to confrontation. Oculomotor, Trochlear and abducens nerves: papillary response to light is brisk. Saccadic intrusions with some nystagmus to lateral gazes. Motor exam: Strength was 5/5. Muscle tone was normal. No atrophy. No fasciculations. Coordination: Dysmetria BUE. L[place]ito [place] Assessment/Plan:  MRI showing lesion along the callosal septal interface and multifocal involving the corpus callosum, deep white matter bilaterally, periventricular, occipital lobes, and left cerebellum that have various hyperintensities. Additionally MRI cervical shows intrinsic lesion at C2 that enhances and c4-6 that do not enhance, Thoracic shows enhancing at T12. All of this is consistent with demyelinating process that is acute and subacute. LP to rule out infectious process and help confirm MS. MS panel, CMV, HSV by PCR, VDRL, and varicella pending. Copper, heavy metal, and thiamine normal.  ACE normal, Lyme negative, Sjogren's normal, HIV, VDRL, and syphilis negative. Some of the MS panel back with increase in IgG ratio consistent with MS. Solumedrol IV every day x 5. Ends Saturday. Can be DCD home with aid per PT/OT recommendations. Has initial appointment with Dr. Jena Sexton 3/20/19 at [address]. Is going to need Medicaid. Appreciate CM help in getting appt and applications in. Discussed at length with the patient, father, mother, and brother. Al questions answered.          Hospital Problems  Never Reviewed          Codes Class Noted POA    * (Principal) Demyelinating disease of central nervous system ([facility] ICD-10-CM: G37.9  ICD-9-CM: 341.9  3/12/2019 Yes        Ataxia ICD-10-CM: R27.0  ICD-9-CM: 781.3  3/11/2019 Yes                Signed By: Migel Wilson NP     March 15, 2019

## 2019-03-15 NOTE — PROGRESS NOTES
Received call back from Lissa Zurita @ [facility], states pt has appointment with Dr. Kyung Nicole March 20,2019 @ [phone]. Provided pt with written paper with above appointment & address & phone# of practice, verbalized understanding. Lucinda WhitakerRN,ext [phone].

## 2019-03-15 NOTE — PROGRESS NOTES
Progress Note      Patient: Wilton Banuelos               Sex: male          DOA: 3/11/2019       YOB: 1985      Age:  26 y.o.        LOS:  LOS: 4 days             CHIEF COMPLAINT:  Dizziness and Numbness      Subjective:     Pt anxious about new diagnosis of MS. He said his symptoms are improving. Objective:      Visit Vitals  /73 (BP 1 Location: Right arm, BP Patient Position: At rest)   Pulse 70   Temp 98 °F (36.7 °C)   Resp 15   Ht 5' 10\" (1.778 m)   Wt 65.8 kg (145 lb)   SpO2 97%   BMI 20.81 kg/m²       Physical Exam:  Physical Exam   Constitutional: He is oriented to person, place, and time and well-developed, well-nourished, and in no distress. HENT:   Head: Normocephalic and atraumatic. Eyes: Conjunctivae and EOM are normal. Pupils are equal, round, and reactive to light. Neck: Normal range of motion. Cardiovascular: Normal rate, regular rhythm and normal heart sounds. Pulmonary/Chest: Effort normal and breath sounds normal.   Abdominal: Soft. Bowel sounds are normal.   Musculoskeletal: Normal range of motion. Neurological: He is alert and oriented to person, place, and time. Skin: Skin is warm and dry. Nursing note and vitals reviewed. Lab/Data Reviewed:  BMP:   No results found for: NA, K, CL, CO2, AGAP, GLU, BUN, CREA, GFRAA, GFRNA  CBC:   No results found for: WBC, HGB, HGBEXT, HCT, HCTEXT, PLT, PLTEXT, HGBEXT, HCTEXT, PLTEXT    Imaging Reviewed:  No results found. Assessment:     Principal Problem:    Demyelinating disease of central nervous system ([facility] (3/12/2019)    Active Problems:    Ataxia (3/11/2019)        PLAN:  · MRI c/w MS. IV solumedrol for 5 days per Neuro. Likely d/c Saturday, per Neuro trying to get him an yokasta with MS clinic in 2 weeks with Dr. Stella Thakur to set him for DMT.    · F/u CSF results - MS panel is positive  · Appreciate Neuro assistance  · Cont acceptable home medications for chronic conditions  · DVT protocol    I have personally reviewed all pertinent labs and films that have officially resulted over the last 24 hours. I have personally checked for all pending labs that are awaiting final results.       Signed By: Beatriz Esparza MD     March 15, 2019

## 2019-03-15 NOTE — PROGRESS NOTES
Bedside shift change report given to Katia Kilgore RN (oncoming nurse) by Danie Collado RN (offgoing nurse). Report included the following information SBAR and Kardex. 1838: Called Dr. Socorro Robertson to D/C [facility] Q4H, telephone order [facility].

## 2019-03-15 NOTE — PROGRESS NOTES
Chart reviewed. Plan remains home with his sig other when medically stable. Called [facility], requested appointment for pt in [address] & made them aware that pt is CHENG pending. States will call me back with appointment, states to make pt aware that they only accept straight CHENG or [place] CHENG. Met with pt & his sig other & made them aware of above, verbalized understanding. Will cont to follow. Lucinda Whitaker RN,ext [phone].

## 2019-03-15 NOTE — PROGRESS NOTES
Problem: Falls - Risk of  Goal: *Absence of Falls  Document Basim Fall Risk and appropriate interventions in the flowsheet. Outcome: Progressing Towards Goal  Fall Risk Interventions:  Mobility Interventions: Communicate number of staff needed for ambulation/transfer, Patient to call before getting OOB         Medication Interventions: Evaluate medications/consider consulting pharmacy, Patient to call before getting OOB, Teach patient to arise slowly    Elimination Interventions: Elevated toilet seat, Call light in reach, Patient to call for help with toileting needs, Toileting schedule/hourly rounds    History of Falls Interventions:  Investigate reason for fall, Door open when patient unattended

## 2019-03-15 NOTE — PROGRESS NOTES
Problem: Self Care Deficits Care Plan (Adult)  Goal: *Acute Goals and Plan of Care (Insert Text)  Occupational Therapy Goals  Initiated 3/12/2019 within 7 day(s). 1.  Patient will perform grooming tasks while standing with supervision and fair dynamic standing balance. 2.  Patient will perform lower body dressing with modified independence and fair+ dynamic sitting balance. 3.  Patient will perform functional task in standing for 8 minutes with supervision and good safety awareness in prep for ADLs. 4.  Patient will perform toilet transfers with supervision. 5.  Patient will perform all aspects of toileting with supervision/set-up. 6.  Patient will participate in upper extremity therapeutic exercise/activities for 8 minutes with supervision/set-up to maintain BUE strength for functional transfers and ADLs. 7.  Patient will utilize energy conservation techniques during functional activities with minimal verbal cues. Outcome: Progressing Towards Goal  Occupational Therapy TREATMENT    Patient: Lei Langley (60 y.o. male)  Date: 3/15/2019  Diagnosis: TIA (transient ischemic attack) [G45.9]  Ataxia [R27.0]  Ataxia [R27.0]  TIA (transient ischemic attack) [G45.9] Demyelinating disease of central nervous system [facility])      Precautions:    Chart, occupational therapy assessment, plan of care, and goals were reviewed. PLOF: Independent    ASSESSMENT:  Pt seen for ADL retraining w/emphasis on energy conservation techniques. Pt requires increase time and set-up w/ADL at EOB. Pt demonstrates energy conservation techniques w/LB dressing ADLs. Pt requires SBA w/fucntional mobility to bathroom and tolerates standing to void w/no LOB.   EDUCATION Pt educated on energy conservation/pacing techniques w/ADLs  Progression toward goals:  [x]          Improving appropriately and progressing toward goals  []          Improving slowly and progressing toward goals  []          Not making progress toward goals and plan of care will be adjusted     PLAN:  Patient continues to benefit from skilled intervention to address the above impairments. Continue treatment per established plan of care. Discharge Recommendations:  Home Health for safety check  Further Equipment Recommendations for Discharge:  shower chair     SUBJECTIVE:   Patient stated I live with my girlfriend.     OBJECTIVE DATA SUMMARY:       Cognitive/Behavioral Status:  Neurologic State: Alert  Orientation Level: Oriented X4  Cognition: Follows commands  Safety/Judgement: Awareness of environment  Functional Mobility and Transfers for ADLs:   Bed Mobility:  Sit to Supine: Modified independent   Transfers:  Sit to Stand: Modified independent   Bathroom Mobility: Supervision/set up  Balance:  Sitting: Intact  Standing: Impaired; Without support  ADL Intervention:  Grooming  Washing Face: Supervision/set-up  Washing Hands: Supervision/set-up  Brushing Teeth: Supervision/set-up    Upper Body Bathing  Bathing Assistance: Supervision  Position Performed: Seated edge of bed    Lower Body Bathing  Perineal  : Supervision/set-up  Position Performed: Standing    Upper [address]: Supervision/ set-up    Lower Body Dressing Assistance  Underpants: Supervision/set-up  Pants With Elastic Waist: Supervision/set-up  Leg Crossed Method Used: Yes  Position Performed: Seated edge of bed  Cues: Don;Doff    Toileting  Toileting Assistance: Supervision  Clothing Management: Supervision/set-up    Pain:  Pre Treatment:0  Post Treatment:0    Activity Tolerance:    Good    Please refer to the flowsheet for vital signs taken during this treatment.   After treatment:   []  Patient left in no apparent distress sitting up in chair  [x]  Patient left in no apparent distress seated EOB  [x]  Call bell left within reach  []  Nursing notified  []  Caregiver present  []  Bed alarm activated    HALLE Bryson  Time Calculation: 30 mins

## 2019-03-15 NOTE — PROGRESS NOTES
Bedside shift change report given to Rudolph Dill RN (oncoming nurse) by Cee Guthrie RN (offgoing nurse). Report included the following information SBAR, Kardex, MAR, Accordion and Recent Results.

## 2019-03-15 NOTE — PROGRESS NOTES
Problem: Mobility Impaired (Adult and Pediatric)  Goal: *Acute Goals and Plan of Care (Insert Text)  Physical Therapy Goals   Initiated 3/12/2019 and to be accomplished within 7 days. 1.  Patient will complete all bed mobility with independence in order to prepare for EOB/OOB activity. 2.  Patient will perform sit <> stand with independence in order to prepare for OOB/gait activity. 3.  Patient will perform bed to chair transfers with independence in order to promote mobility and encourage seated activity to progress towards their prior level of function. 4.  Patient will ambulate 500 feet with independence using LRAD in order to prepare for safe negotiation of their environment. 5.  Patient will ascend/descend 1 steps with S handrail use with independence in order to prepare for safe exit/entry into their home environment. Outcome: Progressing Towards Goal    PHYSICAL THERAPY: Daily TREATMENT Note   INPATIENT: Medicaid: Hospital Day: 5     Patient: Tasha Groves (26 y.o. male)    Date: 3/15/2019  Primary Diagnosis: TIA (transient ischemic attack) [G45.9]  Ataxia [R27.0]  Ataxia [R27.0]  TIA (transient ischemic attack) [G45.9]   ,  ,   Precautions:      Chart, physical therapy assessment, plan of care and goals were reviewed. PLOF:Independent    ASSESSMENT:  Pt very  Motivated, somewhat lacking insight into deficits and requiring cues for pacing and safety. Pt educated on benefit of AD for balance at home, safety and pacing with transfers and gait. Pt verbalizes that he is not aware that he is off balance despite need for CGA assist for balance correction and occasional stepping strategy and path deviations. Pt with improved balance and gait when he slows down and is cued to be more attentive and deliberate with task.     Progression toward goals:      [x]  Improving appropriately and progressing toward goals      []  Improving slowly and progressing toward goals      [] Not making progress toward goals and plan of care will be adjusted     PLAN:  Patient continues to benefit from skilled intervention to address the above impairments. Continue treatment per established plan of care. EDUCATION:   Education:  Patient was educated on the following topics: pacing   Barriers to Learning/Limitations: None  Compensate with: visual, verbal, tactile, kinesthetic cues/model    Discharge Recommendations:  Home Health and Outpatient  Further Equipment Recommendations for Discharge:  straight cane  Factors which may impact discharge planning: none     SUBJECTIVE:   Patient stated I dont know why I am having this problem.     OBJECTIVE DATA SUMMARY:   Critical Behavior:  Neurologic State: Alert  Orientation Level: Oriented X4  Cognition: Follows commands  Safety/Judgement: Awareness of environment      Functional Mobility:      Functional Status      Indep   (I)   Mod I   Super-vision   Min A   Mod A   Max A   Total A   Assist x2 Verbal cues Additional time Not tested   Comments   Rolling []  [x]  [] []    []    []  []  [] [] [] []    Supine to sit []  [x]  [] []  []  []  []  [] [] [] []    Sit to supine []  [x]  [] []  []  []  []  [] [] [] []    Sit to stand []  [x]  [] []  []  []  []  [] [x] [] [] Cues for pacing   Stand to sit []  [x]  [] []  []  []  []  [] [x] [] []    Bed to chair transfers []  []  [] []  []  []  []  [] [] [] [x]        Balance    Good   Fair   Poor   Unable   Not tested   Comments   Sitting static [x]  []  []  []  []    Sitting dynamic [x]  []  []  []  []    Standing static []  [x]  []  []  []    Standing dynamic []  [x]  []  []  []      Mobility/Gait:   Level of Assistance: Contact guard assistance  Assistive Device: none  Distance Ambulated: 405 feet       Base of Support: center of gravity altered  Speed/Shahnaz: fluctuations  Step Length: erratic  Swing Pattern: right asymmetrical  Stance: time  Gait Abnormalities: decreased step clearance and path deviations    Stairs:   Level of Assistance: Not assessed at this time        Vital Signs  Temp: 98 °F (36.7 °C)     Pulse (Heart Rate): 70     BP: 143/73     Resp Rate: 15     O2 Sat (%): 97 %  Pain:  Pre treatment pain level:4  Post treatment pain level:4  Pain Scale 1: Numeric (0 - 10)  Pain Intensity 1: 4  Pain Location 1: Head  Pain Orientation 1: Anterior  Pain Description 1: Aching  Pain Intervention(s) 1: Medication (see MAR)  Activity Tolerance:   Good      After treatment:   []  Patient left in no apparent distress sitting up in chair  [x]  Patient left in no apparent distress in bed  [x]  Call bell left within reach  [x]  Nursing notified  [x]  Caregiver present  []  Bed alarm activated      Iris Petmarcia, PTA   Time Calculation: 19 mins

## 2019-03-16 VITALS
BODY MASS INDEX: 20.76 KG/M2 | DIASTOLIC BLOOD PRESSURE: 74 MMHG | SYSTOLIC BLOOD PRESSURE: 138 MMHG | RESPIRATION RATE: 17 BRPM | HEART RATE: 69 BPM | TEMPERATURE: 97.9 F | WEIGHT: 145 LBS | HEIGHT: 70 IN | OXYGEN SATURATION: 98 %

## 2019-03-16 PROBLEM — G35 MULTIPLE SCLEROSIS (HCC): Status: ACTIVE | Noted: 2019-03-12

## 2019-03-16 LAB
CMV DNA SERPL NAA+PROBE-ACNC: NEGATIVE IU/ML
CMV DNA SERPL NAA+PROBE-LOG IU: NORMAL LOG10 IU/ML
GLUCOSE BLD STRIP.AUTO-MCNC: 119 MG/DL (ref 70–110)
VIT B1 BLD-SCNC: 112.5 NMOL/L (ref 66.5–200)

## 2019-03-16 PROCEDURE — 74011250637 HC RX REV CODE- 250/637: Performed by: FAMILY MEDICINE

## 2019-03-16 PROCEDURE — 82962 GLUCOSE BLOOD TEST: CPT

## 2019-03-16 PROCEDURE — 74011000258 HC RX REV CODE- 258: Performed by: HOSPITALIST

## 2019-03-16 PROCEDURE — 74011250637 HC RX REV CODE- 250/637: Performed by: HOSPITALIST

## 2019-03-16 PROCEDURE — 74011250636 HC RX REV CODE- 250/636: Performed by: HOSPITALIST

## 2019-03-16 RX ADMIN — FOLIC ACID 1 MG: 1 TABLET ORAL at 09:50

## 2019-03-16 RX ADMIN — METHYLPREDNISOLONE SODIUM SUCCINATE 1000 MG: 1 INJECTION, POWDER, LYOPHILIZED, FOR SOLUTION INTRAMUSCULAR; INTRAVENOUS at 16:02

## 2019-03-16 RX ADMIN — PANTOPRAZOLE SODIUM 40 MG: 40 TABLET, DELAYED RELEASE ORAL at 09:50

## 2019-03-16 RX ADMIN — DEXTROSE MONOHYDRATE AND SODIUM CHLORIDE 75 ML/HR: 5; .45 INJECTION, SOLUTION INTRAVENOUS at 05:50

## 2019-03-16 RX ADMIN — Medication 100 MG: at 09:50

## 2019-03-16 NOTE — PROGRESS NOTES
Been discharged today. Emilieyung Jaylon feels stronger after the Solumedrol therapy. So to Followed with our MS clinic next week as planned.

## 2019-03-16 NOTE — PROGRESS NOTES
Patient received in bed awake. Patient A&Ox4, denies pain and discomfort. No distress noted. Frequently use items within reach. Bed locked in low position. Call bell within reach and Patient verbalized understanding of use for assistance and needs. 12- Dr. Corine Leon at [address] station reminded this nurse that Patient to be discharged after evening dose of SoluMedrol given. V.O. to stop IVF (RBV). Patient made aware of discharge oders after IV med; voiced understanding. 36-  Dr. Corine Leon at [facility] station V.O. received to dc telemetry (RBV). 1334- Patient fully dressed said \"the doctor said I can go home. \" Patient's parents and brother at bedside; Patient gave this nurse permission to speak about his care. Patient made aware that it was the neurologist that visited him but that Dr. Corine Leon said discharged this evening after dose of SoluMedrol given; voiced understanding. Patient's parents and family members leaving bedside; to family lounge . 1340- Patient was asked if he would like to sit in family lounge; agreed. Patient ambulating carrying cane; noted lower ext weakness. [id number]- Pt discharge home; no distress noted, accompanied by Yuri Cuadra CNA; Father, Mother and brother via of w/c to front entrance to car. Pt has discharge instructions and belongings. Patient and family voiced understanding of discharge instructions.

## 2019-03-16 NOTE — PROGRESS NOTES
FOC completed for [facility]. referreal in epic. No shower chair available in closet, straight cane provided, as recommended by therapy. Pt uninsured. Plan home with hh.    Care Management Interventions  PCP Verified by CM: No  Palliative Care Criteria Met (RRAT>21 & CHF Dx)?: No  Mode of Transport at Discharge: Other (see comment)(girlfriend)  Transition of Care Consult (CM Consult): Discharge Planning  MyChart Signup: No  Discharge Durable Medical Equipment: No  Physical Therapy Consult: Yes  Occupational Therapy Consult: Yes  Speech Therapy Consult: Yes  Current Support Network:  Other(lives with grifriend)  Confirm Follow Up Transport: Friends  Plan discussed with Pt/Family/Caregiver: Yes  Discharge Location  Discharge Placement: Home with home health

## 2019-03-16 NOTE — DISCHARGE INSTRUCTIONS
DISCHARGE SUMMARY from Nurse    PATIENT INSTRUCTIONS:      What to do at Home:  * Activity: Activity as tolerated     * Diet: Regular Diet     * Follow-up: Neurology on 3/20. PCP in 1 week. * Straight cane provided, as recommended by therapy    * Please obtain shower chair, as recommended by therapy (May   from Equipment supply store or [address] [place] [address]   . * Recommended activity: see above    * If you experience any of the following symptoms as listed in your teaching for MS (Multiple Sclerosis) under \"When Should You Call For Help?\", please follow up with your Primary Care Physician and/or call 911. *  Please give a list of your current medications to your Primary Care Provider. *  Please update this list whenever your medications are discontinued, doses are      changed, or new medications (including over-the-counter products) are added. *  Please carry medication information at all times in case of emergency situations. These are general instructions for a healthy lifestyle:    No smoking/ No tobacco products/ Avoid exposure to second hand smoke  Surgeon General's Warning:  Quitting smoking now greatly reduces serious risk to your health. Obesity, smoking, and sedentary lifestyle greatly increases your risk for illness    A healthy diet, regular physical exercise & weight monitoring are important for maintaining a healthy lifestyle    You may be retaining fluid if you have a history of heart failure or if you experience any of the following symptoms:  Weight gain of 3 pounds or more overnight or 5 pounds in a week, increased swelling in our hands or feet or shortness of breath while lying flat in bed. Please call your doctor as soon as you notice any of these symptoms; do not wait until your next office visit.     Recognize signs and symptoms of STROKE:    F-face looks uneven    A-arms unable to move or move unevenly    S-speech slurred or non-existent    T-time-call 911 as soon as signs and symptoms begin-DO NOT go       Back to bed or wait to see if you get better-TIME IS BRAIN. Warning Signs of HEART ATTACK     Call 911 if you have these symptoms:   Chest discomfort. Most heart attacks involve discomfort in the center of the chest that lasts more than a few minutes, or that goes away and comes back. It can feel like uncomfortable pressure, squeezing, fullness, or pain.  Discomfort in other areas of the upper body. Symptoms can include pain or discomfort in one or both arms, the back, neck, jaw, or stomach.  Shortness of breath with or without chest discomfort.  Other signs may include breaking out in a cold sweat, nausea, or lightheadedness. Don't wait more than five minutes to call 911 - MINUTES MATTER! Fast action can save your life. Calling 911 is almost always the fastest way to get lifesaving treatment. Emergency Medical Services staff can begin treatment when they arrive -- up to an hour sooner than if someone gets to the hospital by car. Patient armband removed and shredded    The discharge information has been reviewed with the patient. The patient verbalized understanding. Discharge medications reviewed with the patient and appropriate educational materials and side effects teaching were provided.   ___________________________________________________________________________________________________________________________________

## 2019-03-16 NOTE — PROGRESS NOTES
8969 Assumed patient care. Received report from Jenaro Alonzo RN(offgoing nurse). Report included SBAR, Kardex, and MAR. Patient resting in bed, in no signs of pain or distress. Call light and possessions within reach. Bed in lowest setting.      2335: Patient had an episode of emesis; states he believes it is something he ate (had a large [employer] meal earlier in shift), VS stable, BG WDL (114), administered zofran and provided popsicle     0155: states nausea is resolved

## 2019-03-16 NOTE — DISCHARGE SUMMARY
Discharge Summary    Patient: Andressa Heaton               Sex: male          DOA: 3/11/2019       YOB: 1985      Age:  26 y.o.        LOS:  LOS: 5 days                Admit Date: 3/11/2019    Discharge Date: 3/16/2019    Admission Diagnoses: Ataxia [R27.0]      Discharge Diagnoses:    Problem List as of 3/16/2019 Never Reviewed          Codes Class Noted - Resolved    * (Principal) [facility] ICD-10-CM: Jessica Simple  ICD-9-CM: 340  3/12/2019 - Present        TIA (transient ischemic attack) ICD-10-CM: G45.9  ICD-9-CM: 435.9  3/11/2019 - Present        Ataxia ICD-10-CM: R27.0  ICD-9-CM: 781.3  3/11/2019 - Present              Discharge Condition:  Improved    HPI:  Andressa Heaton is a 26 y.o. male who presented to the ED with three weeks hx of dizziness that did not respond to apparently meclizine, associated with ataxia. No reported falls. No vertigo. No CV sx. No fever or signs of infection. No apparent weakness in LE. ED evaluation revealed abnormal CT with suspicion for MS. Tele-neurology consulted. Still ataxic. Will admit for further evaluation and treatment     Hospital Course:  Pt underwent MRI brain which showed extensive white matter lesions with a typical pattern of demyelinating process such as multiple sclerosis. There were also several lesions in the cervical spine and also thoracic spine. Neurology was consulted and pt was started on solumedrol 1000mg daily for 5 days. Pt improved symptomatically with improvement in balance and dizziness. LP was done and Multiple Sclerosis Panel on CSF was positive (see full result below). He was discharged with follow-up with Dr. Sulema [facility]iron [facility] on 3/20/19 at 11:15 to establish care for MS and to start on disease modifying therapy. Patient and family's questions were answered by primary team as well as Neurology. PT/OT recommended home health, which was set up by the .  He was discharged with a straight cane per PT recs.      Consults:    Neurology    Labs:  Labs: Results:       Chemistry No results for input(s): GLU, NA, K, CL, CO2, BUN, CREA, CA, AGAP, BUCR, TBIL, GPT, AP, TP, ALB, GLOB, AGRAT in the last 72 hours. CBC w/Diff No results for input(s): WBC, RBC, HGB, HCT, PLT, GRANS, LYMPH, EOS, HGBEXT, HCTEXT, PLTEXT in the last 72 hours. Cardiac Enzymes No results for input(s): CPK, CKND1, DANA in the last 72 hours. No lab exists for component: CKRMB, TROIP   Coagulation No results for input(s): PTP, INR, APTT in the last 72 hours. No lab exists for component: INREXT    Lipid Panel Lab Results   Component Value Date/Time    Cholesterol, total 167 03/11/2019 07:46 PM    HDL Cholesterol 56 03/11/2019 07:46 PM    LDL, calculated 99 03/11/2019 07:46 PM    VLDL, calculated 12 03/11/2019 07:46 PM    Triglyceride 60 03/11/2019 07:46 PM    CHOL/HDL Ratio 3.0 03/11/2019 07:46 PM      BNP No results for input(s): BNPP in the last 72 hours. Liver Enzymes No results for input(s): TP, ALB, TBIL, AP, SGOT, GPT in the last 72 hours. No lab exists for component: DBIL   Thyroid Studies Lab Results   Component Value Date/Time    TSH 1.20 03/11/2019 09:38 PM            Multiple Sclerosis Panel:  IgG, Quant, CSF   Date Value Ref Range Status   03/13/2019 5.3 0.0 - 8.6 mg/dL Final     Albumin, CSF   Date Value Ref Range Status   03/13/2019 15 11 - 48 mg/dL Final     Albumin, serum   Date Value Ref Range Status   03/13/2019 4.5 3.5 - 5.5 g/dL Final     Comment:     (NOTE)  Performed At: [facility]  [employer] [address] [id number]  Lincoln Bernabe MD PF:[id number]       Immunoglobulin G, Qt.    Date Value Ref Range Status   03/13/2019 1,357 700 - 1,600 mg/dL Final     Comment:     (NOTE)  Performed At: [facility] [id number]  Lincoln Bernabe MD WQ:[id number]       IgG/Alb ratio, CSF   Date Value Ref Range Status   03/13/2019 0.35 (H) 0.00 - 0.25   Final     CSF IgG Index Date Value Ref Range Status   03/13/2019 1.2 (H) 0.0 - 0.7   Final     IgG Synthesis Rate, CSF   Date Value Ref Range Status   03/13/2019 11.1 (H) NEG 9.9 TO +3.3 mg/day Final     Oligoclonal Bands   Date Value Ref Range Status   03/13/2019 Comment   Final     Comment:     (NOTE)  Seven (7) oligoclonal bands were observed in the CSF, which were not  detected in the serum sample. Interpretation:  Criteria for Positivity: Four (4) or more oligoclonal bands  observed  only in the CSF have been shown to be most consistent with  MS  using our method. [Dia AS, Noel EL, Serafin PARSONS, and  Alan JA: Cerebrospinal Fluid Oligoclonal Bands in the  Diagnosis  of Multiple Sclerosis. Am J Clin Pathol 120(5):672-675,  2003]. Oligoclonal bands that are present only in the CSF have been  associated with a variety of inflammatory brain diseases such as  multiple sclerosis (MS), subacute encephalitis, neurosyphilis,  etc.  Increased IgG in the CSF is not specific for MS, but is an  indication  of chronic neural inflammation. Clinical correlation  indicated. Approximately 2-3% of clinically confirmed MS patients show little  or no evidence of oligoclonal bands in the CSF; however  oligoclonal  bands may develop as the disease progresses. Oligoclonal Banding testing performed using Isoelectric Focusing  (IEF) and immunoblotting methodology. Performed At: [address] [id number]  Ely Vera MD MW:[id number]       CSF/Serum Alb.  Index   Date Value Ref Range Status   03/13/2019 3 0 - 8   Final     Comment:     (NOTE)          Relationship to blood-brain barrier:           Consistent with an intact barrier        <9           Slight impairment                   9 -  14           Moderate impairment                14 -  30           Severe impairment                  30 - 100           Complete breakdown                     >100  Performed At: [facility]  [address] [place], [place] [id number]  Minh Hooks MD MR:[id number]         Significant Diagnostic Studies:   Xr Spinal Punc Lumb Dx    Result Date: 3/13/2019  FLUOROSCOPIC GUIDED LUMBAR PUNCTURE: CLINICAL INDICATION: Ataxia, with abnormal findings concerning for multiple sclerosis on prior brain MRI COMPARISON: Brain MRI and cervical spine MRI 3/12/2019 After the procedure as well as its risks, benefits and alternatives was explained to the patient, and all questions answered, written informed consent was obtained. A timeout was performed at 1:32 PM to confirm correct patient, procedure, positioning, and precautions. Examination performed with standard aseptic technique. Using fluoroscopy for guidance a lumbar puncture was performed at the L3-L4 level with a 20 gauge spinal needle after local anesthesia. CSF was clear and colorless. CSF was withdrawn for the requested laboratory evaluation. Total of 11 mL of CSF was obtained. Standard post procedure pause. Patient tolerated the procedure well and there were no immediate complications. GUIDANCE: Fluoroscopy guidance was used to position (and confirm the position of) the needle. Image(s) saved in PACS: Fluoroscopy Radiation dose (reference air kerma): 3.8 mGy     Impression: =========== Successful fluoroscopic guided diagnostic lumbar puncture. Mri Brain W Wo Cont    Result Date: 3/12/2019  EXAM: MRI brain without and with gadolinium CLINICAL INDICATION/HISTORY: TIA   > Additional: Ataxia COMPARISON: None. > Reference Exam: Correlation CT head 3/11/2019 TECHNIQUE: Sagittal FLAIRT1, T2 FLAIR, axial T1, T2, FLAIR, susceptibility weighted, and diffusion sequences obtained of the brain. Following gadolinium administration, axial and coronal T1 sequences obtained.  Imaging performed on wide bore Discovery [id number] [place] suite 3T magnet at [facility]. _______________ FINDINGS: Diffusion:  Multifocal areas of periventricular and deep white matter increased diffusion signal, with small focus involving left corona radiata which appears to have associated low ADC map. No other evidence of restricted diffusion signal. Brain parenchyma: There is extensive abnormal FLAIR hyperintensity callosal septal interface and multifocal involving the corpus callosum. There is extensive confluent and multifocal increased T2 and FLAIR signal periventricular and deep cerebral white matter with numerous perpendicular oriented radiating periventricular lesions. Additional areas of subcortical white matter FLAIR hyperintensity most pronounced bilateral parietal lobes left greater than right. Additional subcortical lesions involving occipital lobes. Many of these lesions have associated low T1 signal and many of these lesions have associated enhancement including numerous periventricular lesions and subcortical lesions. There is enhancing lesion superior left cerebellum and small with no other infratentorial enhancing lesions. Additional lesion involving medial right basal ganglia, medial left basal ganglia, left midbrain, lateral right upper syed, and left cerebellar deep white matter. There is no evidence of significant mass effect or volume loss. No evidence of hemorrhage. Ventricles and sulci:  Normal.  No significant atrophy given age. Extra-axial:  No extra-axial fluid collection or mass is noted. Contrast:As above. Brain vasculature:  No vascular abnormality is appreciated on this routine brain MR examination. Craniocervical junction:  Normal. Skull base, extracranial and calvarium:  Orbits paranasal sinuses, IACs unremarkable. Minimal increased T2 signal bilateral mastoids without coalescence. Partial empty sella. No skull abnormality. , _______________     IMPRESSION: 1. Extensive mostly supratentorial white matter lesions with a typical pattern of demyelinating process such as multiple sclerosis.  Many of these lesions show associated enhancement suggesting more active areas of demyelination. Additional involvement brainstem and mildly involving cerebellum, with single enhancing lesion superior left cerebellum. 2. Many of these supratentorial lesions have associated T1 hypointensity. Mri Cerv Spine W Wo Cont    Result Date: 3/12/2019  EXAM: MRI cervical spine with gadolinium CLINICAL INDICATION/HISTORY: ataxia   > Additional: None. COMPARISON: Correlation brain MRI same day   > Reference Exam: None. TECHNIQUE: Sagittal FLAIRT1, FSE proton density and T2, and STIR sequences, and axial spin echo T1, T2 and volume 3-D T2*GRE sequences were obtained of the cervical spine without gadolinium. Following gadolinium administration, sagittal and axial T1 sequences obtained. _______________ FINDINGS: There is slight degenerative retrolisthesis C4/5 with otherwise normal alignment with normal vertebral body height with no evidence of fracture. No abnormal marrow signal. Visualized base of brain and soft tissues of neck unremarkable. There is a longitudinally oriented lesion within the cord at the C2 level with small dorsal bandlike associated enhancement without cord enlargement. Several additional small foci of T2 and STIR cord hyperintensity noted C4 and C5 and C6 levels with no evidence of cord enlargement or other cord enhancement. C2/3 level: Unremarkable. C3/4 level: Unremarkable. C4/5 level: Degenerative disc changes with minimal disc bulge with slight cord flattening but no high-grade stenosis. C5/6 level: Mild degenerative spondylosis without stenosis. C6/7 level: Small broad right posterior and foraminal disc protrusion with mild right foraminal stenosis with no high-grade canal stenosis. C7/T1 level: Unremarkable. Contrast:As above. _______________     IMPRESSION: 1. C2 intrinsic cord lesion with small associated enhancement without cord enlargement, with several additional nonenhancing small cord lesions C4-C6.  Given findings within the brain, findings likely related to areas of demyelination and multiple sclerosis. Enhancing C2 lesion suggesting more acute area of demyelination. 2. Multilevel mild degenerative spondylosis and small disc protrusions as above with no high-grade stenosis. Mri Thorac Spine W Wo Cont    Result Date: 3/12/2019  EXAM: Thoracic spine MRI with gadolinium CLINICAL INDICATION/HISTORY: T/L-spine trauma, significant injury suspected, neurologic deficits   > Additional: Ataxia COMPARISON: Correlation brain and cervical spine MRI same day   > Reference Exam: None. TECHNIQUE: Sagittal spin echo T1, STEPAN T2 and proton density sequences, sagittal STIR sequences, and selected angled axial sequences through the discs with spin echo T1 and T2 sequences were obtained of the thoracic spine. Post gadolinium sagittal and axial T1 weighted sequences were also obtained. _______________ FINDINGS: There is normal alignment with normal vertebral body height with no evidence of fracture or abnormal marrow signal. No thoracic disc abnormality or stenosis. There is longitudinally oriented cord lesion involving the T12 cord eccentrically posteriorly with associated enhancement without significant cord enlargement. No other definite intrinsic cord lesion. _______________     IMPRESSION: 1. Enhancing intrinsic cord lesion T12 without cord enlargement. Given additional findings and brain and cervical cord, findings likely related to area of demyelination in this patient with likely multiple sclerosis. Enhancement suggests more acute area of demyelination. 2. No other significant finding. Ct Head Wo Cont    Result Date: 3/12/2019  CT head without contrast INDICATION: Vertigo, dizziness and lightheadedness. COMPARISON: None.  TECHNIQUE: Axial CT imaging of the head was performed without intravenous contrast. One or more dose reduction techniques were used on this CT: automated exposure control, adjustment of the mAs and/or kVp according to patient size, and iterative reconstruction techniques. The specific techniques used on this CT exam have been documented in the patient's electronic medical record. Digital Imaging and Communications in Medicine (DICOM) format image data are available to nonaffiliated external healthcare facilities or entities on a secure, media free, reciprocally searchable basis with patient authorization for at least a 12-month period after this study. _______________ FINDINGS: BRAIN AND POSTERIOR FOSSA: There is mild generalized prominence of the sulci and ventricles for age. There are nonspecific periventricular and subcortical white matter hypodensities bilaterally. There is no intracranial hemorrhage, mass effect, or midline shift. EXTRA-AXIAL SPACES AND MENINGES: There are no abnormal extra-axial fluid collections. CALVARIUM: Intact. SINUSES: Clear. OTHER: None. _______________     IMPRESSION: 1. No evidence for acute cortical infarct, hemorrhage, or mass effect. CT can be negative in acute setting. 2. Mild generalized atrophy and nonspecific white matter hypodensities. Differential considerations include demyelinating process or small vessel ischemic changes. Clinical correlation and correlation with MRI should be considered for possible multiple sclerosis. A preliminary report provided by on call radiology resident. Discharge Medications:     Current Discharge Medication List          Activity: Activity as tolerated    Diet: Regular Diet    Follow-up: Neurology on 3/20. PCP in 1 week.          Total time spent including time spent on final examination and discharge discussion, discharge documentation and records reviewed and medication reconciliation: > 30 minutes    Matt Posada MD  [facility]

## 2019-03-16 NOTE — PROGRESS NOTES
0720: Bedside and Verbal shift change report given to Susan Inman RN (oncoming nurse) by uRdolph Dill RN (offgoing nurse). Report included the following information SBAR, Kardex and MAR.

## 2019-03-16 NOTE — PROGRESS NOTES
Problem: Falls - Risk of  Goal: *Absence of Falls  Document Basim Fall Risk and appropriate interventions in the flowsheet. Outcome: Progressing Towards Goal  Fall Risk Interventions:  Mobility Interventions: Communicate number of staff needed for ambulation/transfer         Medication Interventions: Evaluate medications/consider consulting pharmacy    Elimination Interventions: Elevated toilet seat    History of Falls Interventions:  Investigate reason for fall

## 2019-03-18 ENCOUNTER — HOME CARE VISIT (OUTPATIENT)
Dept: SCHEDULING | Facility: HOME HEALTH | Age: 34
End: 2019-03-18
Payer: MEDICAID

## 2019-03-18 VITALS
TEMPERATURE: 96.5 F | DIASTOLIC BLOOD PRESSURE: 62 MMHG | SYSTOLIC BLOOD PRESSURE: 120 MMHG | OXYGEN SATURATION: 95 % | HEART RATE: 65 BPM

## 2019-03-18 LAB
BACTERIA SPEC CULT: NORMAL
GRAM STN SPEC: NORMAL
GRAM STN SPEC: NORMAL
SERVICE CMNT-IMP: NORMAL

## 2019-03-18 PROCEDURE — 400013 HH SOC

## 2019-03-18 PROCEDURE — G0151 HHCP-SERV OF PT,EA 15 MIN: HCPCS

## 2019-03-19 ENCOUNTER — HOME CARE VISIT (OUTPATIENT)
Dept: SCHEDULING | Facility: HOME HEALTH | Age: 34
End: 2019-03-19
Payer: MEDICAID

## 2019-03-19 VITALS
OXYGEN SATURATION: 98 % | DIASTOLIC BLOOD PRESSURE: 83 MMHG | HEART RATE: 72 BPM | TEMPERATURE: 97.2 F | SYSTOLIC BLOOD PRESSURE: 142 MMHG

## 2019-03-19 LAB
SPECIMEN SOURCE: NORMAL
VZV DNA SPEC QL NAA+PROBE: NEGATIVE

## 2019-03-19 PROCEDURE — G0157 HHC PT ASSISTANT EA 15: HCPCS

## 2019-03-20 ENCOUNTER — TELEPHONE (OUTPATIENT)
Dept: INTERNAL MEDICINE CLINIC | Age: 34
End: 2019-03-20

## 2019-03-20 NOTE — LETTER
3/22/2019 11:50 AM 
 
Mr. Carrillo [place] [address] [facility] [id number] Dear Shelly Harley: 
 
I hope this letter finds you well. I am a Licensed Practical Nurse with [facility] and I have attempted to contact you by phone, but was unsuccessful. Your good health is important to us. As always, our goal is to be your partner in life-long wellness. Please contact our office at your earliest convenience. If you have any questions, please do not hesitate to give us a call at the number listed above. Sincerely, [facility] [place], N

## 2019-03-20 NOTE — TELEPHONE ENCOUNTER
Please call pt to set up a 30 minute appt. For [facility] f/u from 3/11/19-3/16/19 for ataxia.

## 2019-03-21 ENCOUNTER — HOME CARE VISIT (OUTPATIENT)
Dept: SCHEDULING | Facility: HOME HEALTH | Age: 34
End: 2019-03-21
Payer: MEDICAID

## 2019-03-21 VITALS
OXYGEN SATURATION: 97 % | TEMPERATURE: 98.2 F | SYSTOLIC BLOOD PRESSURE: 128 MMHG | DIASTOLIC BLOOD PRESSURE: 64 MMHG | HEART RATE: 72 BPM

## 2019-03-21 PROCEDURE — G0157 HHC PT ASSISTANT EA 15: HCPCS

## 2019-03-22 ENCOUNTER — HOME CARE VISIT (OUTPATIENT)
Dept: SCHEDULING | Facility: HOME HEALTH | Age: 34
End: 2019-03-22
Payer: MEDICAID

## 2019-03-22 PROCEDURE — G0152 HHCP-SERV OF OT,EA 15 MIN: HCPCS

## 2019-03-22 NOTE — TELEPHONE ENCOUNTER
Attempted to contact pt at  number, person answered phone and states that number for pt in chart is incorrect. I have been unable to reach this patient by phone. A letter is being sent to the last known home address. Encounter will be closed.

## 2019-03-24 VITALS
OXYGEN SATURATION: 96 % | TEMPERATURE: 97.7 F | SYSTOLIC BLOOD PRESSURE: 138 MMHG | DIASTOLIC BLOOD PRESSURE: 76 MMHG | HEART RATE: 64 BPM

## 2019-03-25 ENCOUNTER — HOME CARE VISIT (OUTPATIENT)
Dept: HOME HEALTH SERVICES | Facility: HOME HEALTH | Age: 34
End: 2019-03-25
Payer: MEDICAID

## 2019-03-26 ENCOUNTER — HOME CARE VISIT (OUTPATIENT)
Dept: HOME HEALTH SERVICES | Facility: HOME HEALTH | Age: 34
End: 2019-03-26
Payer: MEDICAID

## 2019-03-28 ENCOUNTER — HOME CARE VISIT (OUTPATIENT)
Dept: HOME HEALTH SERVICES | Facility: HOME HEALTH | Age: 34
End: 2019-03-28
Payer: MEDICAID

## 2019-04-04 ENCOUNTER — OFFICE VISIT (OUTPATIENT)
Dept: FAMILY MEDICINE CLINIC | Age: 34
End: 2019-04-04

## 2019-04-04 VITALS
HEIGHT: 72 IN | WEIGHT: 141 LBS | BODY MASS INDEX: 19.1 KG/M2 | DIASTOLIC BLOOD PRESSURE: 62 MMHG | HEART RATE: 76 BPM | OXYGEN SATURATION: 98 % | SYSTOLIC BLOOD PRESSURE: 119 MMHG | TEMPERATURE: 97.9 F | RESPIRATION RATE: 16 BRPM

## 2019-04-04 DIAGNOSIS — R27.0 ATAXIA: ICD-10-CM

## 2019-04-04 DIAGNOSIS — G45.9 TIA (TRANSIENT ISCHEMIC ATTACK): ICD-10-CM

## 2019-04-04 DIAGNOSIS — G35 MULTIPLE SCLEROSIS (HCC): Primary | ICD-10-CM

## 2019-04-04 NOTE — PROGRESS NOTES
1. Have you been to the ER, urgent care clinic since your last visit? Hospitalized since your last visit? Yes  [facility] for MS    2. Have you seen or consulted any other health care providers outside of the [address] [place] [address] since your last visit? Include any pap smears or colon screening.  NO      Chief Complaint   Patient presents with   [facility] Follow Up     MS     Visit Vitals  /62 (BP 1 Location: Left arm, BP Patient Position: At rest)   Pulse 76   Temp 97.9 °F (36.6 °C)   Resp 16   Ht 6' (1.829 m)   Wt 141 lb (64 kg)   SpO2 98%   BMI 19.12 kg/m²

## 2019-04-04 NOTE — PATIENT INSTRUCTIONS
Multiple Sclerosis (MS): Care Instructions  Your Care Instructions  Multiple sclerosis, also called MS, is a disease that can affect the brain, spinal cord, and nerves to the eyes. MS can cause problems with muscle control and strength, vision, balance, feeling, and thinking. Whatever your symptoms are, taking medicine correctly and following your doctor's advice for home care can help you maintain your quality of life. Follow-up care is a key part of your treatment and safety. Be sure to make and go to all appointments, and call your doctor if you are having problems. It's also a good idea to know your test results and keep a list of the medicines you take. How can you care for yourself at home? General care  · Take your medicines exactly as prescribed. Call your doctor if you think you are having a problem with your medicine. · Use a cane, walker, or scooter if your doctor suggests it. · Keep doing your normal activities as much as you can. · If you have problems urinating, press or tap your bladder area to help start urine flow. If you have trouble controlling your urine, plan your fluid intake and activities so that a toilet will be available when you need it. · Spend time with family and friends. Join a support group for people with MS if you want extra help. · Depression is common with this condition. Tell your doctor if you have trouble sleeping, are eating too much or are not hungry, or feel sad or tearful all the time. Depression can be treated with medicine and counseling. Diet and exercise  · Eat a balanced diet. · If you have problems swallowing, change how and what you eat:  ? Try thick drinks, such as milk shakes. They are easier to swallow than other fluids. ? Do not eat foods that crumble easily. These can cause choking. ? Use a  to prepare food. Soft foods need less chewing. ? Eat small meals often so that you do not get tired from eating larger meals.   · Get exercise on most days. Work with your doctor to set up a program of walking, swimming, or other exercise that you are able to do. A physical therapist can teach you exercises if you cannot walk but can move your limbs and trunk. Or you can do exercises to help with coordination and balance. You can help improve muscle stiffness by doing exercises while lying in certain positions. When should you call for help? Call your doctor now or seek immediate medical care if:    · You have a change in symptoms.     · You fall or have another injury.     · You have symptoms of a urinary infection. For example:  ? You have blood or pus in your urine. ? You have pain in your back just below your rib cage. This is called flank pain. ? You have a fever, chills, or body aches. ? It hurts to urinate. ? You have groin or belly pain.    Watch closely for changes in your health, and be sure to contact your doctor if:    · You want more information about MS or medicines.     · You have questions about alternative treatments. Do not use any other treatments without talking to your doctor first.   Where can you learn more? Go to http://tomeka-darlin.info/. Enter L593 in the search box to learn more about \"Multiple Sclerosis (MS): Care Instructions. \"  Current as of: Aide 3, 2018  Content Version: 11.9  © 6667-0640 [employer]. Care instructions adapted under license by [employer] (which disclaims liability or warranty for this information). If you have questions about a medical condition or this instruction, always ask your healthcare professional. [place] [address] any warranty or liability for your use of this information.

## 2019-04-04 NOTE — PROGRESS NOTES
Pt is a 26y.o. year old male who presents for transition of care. Hospitalization summary; Went to ED due to difficulty remembering things and balance disturbance  On 3/11 admitied and diagnosed with MS  Copied from chart:  Pt is a 25 yo Male with PMHx of recently diagnosed MS who was previously admitted to [facility] ED on 3/11/19 for dizziness and ataxia where he was subsequently found to have Multiple sclerosis. PEr initial work-up, his MRI head and cervical/thoracic spine showed multiple lesions and contrast enhancing lesions. His CSF studies had 7 OCB. Neurology was consulted and he was given solumedrol 1g daily x5 days with some improvement in dizziness and was eventually discharged home with PT and cane. He then followed-up with his OP neurologist, Dr. Donn Cornejo and he continued OP infusion of solumedrol where he had some N/V. It was felt that patient's response to steroids were inadequate, so his OP neurologist recommended admission to initiate plasmapheresis.    Hospital Course:  Pt was admitted for MS exacerbation with gait ataxia, R visual field cut, MRI brain, cervical/thoracic spine with multiple enhancing and nonenhancing lesions, including enhancing lesions in left cerebellum and cord at C2 and T12. Newly dx MS this month, OP neurologist, Dr. Donn oCrnejo. Has not started DMT. Neurology was consulted, was placed on Solumedrol with no improvement of gait ataxia. Plasmapheresis every other day for at least 5 days planned started on 3/26, completed 5 rounds of therapy. Had assistance of vascular for line placement. Had an issue(pulled out ) with the line and had to have the line re-replaced before the last treatment. He did well post therapy and was seen by PT who recommended outpatient therapy. Patient already has a cane hence was discharged after discussion with his fiancée about patient's care. They were educated to follow-up with his outpatient neurologist as soon as possible.        Test results:  CT HEAD W/O CONTRAST (03/28/2019 9:38 PM EDT)  CT HEAD W/O CONTRAST (03/28/2019 9:38 PM EDT)   Impressions Performed At   1. Multiple foci of periventricular and subcortical white matter hypoattenuation which are nonspecific based on this single study and lack of available prior imaging however could be compatible with provided history of multiple sclerosis.    2. No definite acute intracranial process.         CHEST PORTABLE (03/25/2019 3:22 PM EDT)  CHEST PORTABLE (03/25/2019 3:22 PM EDT)   Impressions Performed At       1.  No acute cardiopulmonary disease. No pneumothorax.                ROS:  Review of Systems   Constitutional: Positive for weight loss. Due to everything going on   HENT: Negative. Last Dental exam:   Eyes: Negative. Last eye exam:   Respiratory: Negative. Cardiovascular: Negative. Gastrointestinal: Negative. Genitourinary: Negative. No problems with genitalia   Musculoskeletal: Negative. Skin: Negative. Small bumps on arms develop when went into hospial at [place]   Neurological: Positive for dizziness. Dizzness:   Difficulty expressing what he wants to say     Endo/Heme/Allergies: Negative for polydipsia. Psychiatric/Behavioral: Positive for memory loss.        Date admitted: 3/25/19     Date discharged:4/3/19    Date of face to face: 4/4/19    Medication reconciliation performed: Yes    Medications added/changed/discontinued: none    Review discharge instructions: Yes    Need for follow up on in hospital testing: No    Need for follow up with specialist: Yes  Name of specialist: Neurologist, Dr. Sergey Beal    Does patient have help at home: Yes  Name: serenity    Barriers to obtaining medications: No    Patient/family educated on cause of hospitalization: Yes    Patient/family able to repeat back cause of hospitalization, disease process, medication changes, and when to seek help: Yes    Patient Past Records were reviewed: yes    Vitals:    04/04/19 1030   BP: 119/62   Pulse: 76   Resp: 16   Temp: 97.9 °F (36.6 °C)   SpO2: 98%   Weight: 141 lb (64 kg)   Height: 6' (1.829 m)      Body mass index is 19.12 kg/m². Physical assessment:  Physical Exam   Constitutional: He is oriented to person, place, and time and well-developed, well-nourished, and in no distress. Vital signs are normal.   Fiance in room with him helping to provide information   HENT:   Head: Normocephalic and atraumatic. Right Ear: Hearing, tympanic membrane, external ear and ear canal normal.   Left Ear: Hearing, tympanic membrane, external ear and ear canal normal.   Nose: Nose normal.   Mouth/Throat: Oropharynx is clear and moist and mucous membranes are normal.   Eyes: Pupils are equal, round, and reactive to light. Conjunctivae and EOM are normal.   Neck: Trachea normal and normal range of motion. Cardiovascular: Normal rate, regular rhythm, S1 normal and S2 normal.   Pulmonary/Chest: Effort normal and breath sounds normal.   Abdominal: Soft. Normal appearance, normal aorta and bowel sounds are normal. There is no tenderness. Musculoskeletal:   Ataxic gait, does not bend knees when walking  Can stand on his toes but needs assistance with balance  Abnormal rhomberg   Lymphadenopathy:        Head (right side): No submental, no submandibular, no tonsillar, no preauricular, no posterior auricular and no occipital adenopathy present. Head (left side): No submental, no submandibular, no tonsillar, no preauricular, no posterior auricular and no occipital adenopathy present. Neurological: He is alert and oriented to person, place, and time. He displays abnormal speech and abnormal reflex. He displays no weakness and facial symmetry. No cranial nerve deficit. He has an abnormal Romberg Test. Gait abnormal.   Reflex Scores:       Tricep reflexes are 2+ on the right side and 2+ on the left side.        Bicep reflexes are 2+ on the right side and 2+ on the left side.       Brachioradialis reflexes are 2+ on the right side and 2+ on the left side. Patellar reflexes are 4+ on the right side and 4+ on the left side. Achilles reflexes are 3+ on the right side and 3+ on the left side. Skin: Skin is warm, dry and intact. Psychiatric: Mood and judgment normal. His affect is blunt. He exhibits abnormal new learning ability.    Has difficulty finding his words at times           Patient Active Problem List   Diagnosis Code    TIA (transient ischemic attack) G45.9    Ataxia R27.0    Multiple sclerosis ([facility] G35       Past Medical History:   Diagnosis Date    Multiple sclerosis ([facility]           Social History     Socioeconomic History    Marital status: SINGLE     Spouse name: Not on file    Number of children: Not on file    Years of education: Not on file    Highest education level: Not on file   Occupational History    Not on file   Social Needs    Financial resource strain: Not on file    Food insecurity:     Worry: Not on file     Inability: Not on file    Transportation needs:     Medical: Not on file     Non-medical: Not on file   Tobacco Use    Smoking status: Never Smoker    Smokeless tobacco: Never Used   Substance and Sexual Activity    Alcohol use: Yes     Comment: occ    Drug use: No    Sexual activity: Not on file   Lifestyle    Physical activity:     Days per week: Not on file     Minutes per session: Not on file    Stress: Not on file   Relationships    Social connections:     Talks on phone: Not on file     Gets together: Not on file     Attends [employer] service: Not on file     Active member of club or organization: Not on file     Attends meetings of clubs or organizations: Not on file     Relationship status: Not on file    Intimate partner violence:     Fear of current or ex partner: Not on file     Emotionally abused: Not on file     Physically abused: Not on file     Forced sexual activity: Not on file   Other Topics Concern  Not on file   Social History Narrative    Not on file     Family History   Problem Relation Age of Onset    No Known Problems Mother     No Known Problems Father     No Known Problems Sister            Social History     Tobacco Use   Smoking Status Never Smoker   Smokeless Tobacco Never Used       No Known Allergies    Assessment/ Plan:   Diagnoses and all orders for this visit:    1. Multiple sclerosis ([address]  -     [address] [place] [address]    2. Ataxia  -     REFERRAL TO HOME HEALTH    3. TIA (transient ischemic attack)  -     [address] [place] [address]    -I feel mr. Whitney Barakat can benefit from physical therapy, his ability to leave the house is limited due to transportation  -information on transportation assistance give to patient and fiance. -Most of his care will be managed by his neurologist    Follow-up and Dispositions    · Return in about 1 year (around 4/4/2020) for Annual physical, 30 minutes. I have discussed the diagnosis with the patient and the intended plan as seen in the above orders. The patient has received an After-Visit Summary and questions were answered concerning future plans. Medication Side Effects and Warnings were discussed with patient: yes      Return to clinic if sxs persist, to ER if sxs worsen    Patient verbalized understanding of above instructions. AVS printed and given to pt. Jessie Gallo, AGNP-BC  [address] [facility] [place]   [address] [facility] [address]  [address]

## 2019-04-06 ENCOUNTER — HOME CARE VISIT (OUTPATIENT)
Dept: SCHEDULING | Facility: HOME HEALTH | Age: 34
End: 2019-04-06
Payer: MEDICAID

## 2019-04-06 VITALS
HEART RATE: 70 BPM | TEMPERATURE: 97.3 F | RESPIRATION RATE: 17 BRPM | SYSTOLIC BLOOD PRESSURE: 110 MMHG | DIASTOLIC BLOOD PRESSURE: 70 MMHG

## 2019-04-06 PROCEDURE — G0151 HHCP-SERV OF PT,EA 15 MIN: HCPCS

## 2019-04-08 ENCOUNTER — HOME CARE VISIT (OUTPATIENT)
Dept: HOME HEALTH SERVICES | Facility: HOME HEALTH | Age: 34
End: 2019-04-08
Payer: MEDICAID

## 2019-04-09 ENCOUNTER — HOME CARE VISIT (OUTPATIENT)
Dept: SCHEDULING | Facility: HOME HEALTH | Age: 34
End: 2019-04-09
Payer: MEDICAID

## 2019-04-09 PROCEDURE — G0157 HHC PT ASSISTANT EA 15: HCPCS

## 2019-04-10 ENCOUNTER — TELEPHONE (OUTPATIENT)
Dept: INTERNAL MEDICINE CLINIC | Age: 34
End: 2019-04-10

## 2019-04-10 VITALS
OXYGEN SATURATION: 97 % | TEMPERATURE: 97.6 F | HEART RATE: 71 BPM | DIASTOLIC BLOOD PRESSURE: 60 MMHG | SYSTOLIC BLOOD PRESSURE: 134 MMHG

## 2019-04-10 NOTE — TELEPHONE ENCOUNTER
Pt needs 30 minute new pt OV and hospital f/u for ataxia. He was admitted from 3/11/19-3/16/19 at [facility] for ataxia.

## 2019-04-10 NOTE — LETTER
4/16/2019 11:18 AM 
 
Mr. [phone] [id number] Dear Keyona Dixon: 
 
I hope this letter finds you well. I am a Licensed Practical Nurse with [facility] and I have attempted to contact you by phone, but was unsuccessful. Your good health is important to us. As always, our goal is to be your partner in life-long wellness. Please contact our office at your earliest convenience. If you have any questions, please do not hesitate to give us a call at the number listed above. Sincerely, Jovita Vasquez LPN

## 2019-04-11 ENCOUNTER — HOME CARE VISIT (OUTPATIENT)
Dept: SCHEDULING | Facility: HOME HEALTH | Age: 34
End: 2019-04-11
Payer: MEDICAID

## 2019-04-11 VITALS
TEMPERATURE: 98 F | OXYGEN SATURATION: 97 % | HEART RATE: 60 BPM | DIASTOLIC BLOOD PRESSURE: 70 MMHG | SYSTOLIC BLOOD PRESSURE: 120 MMHG

## 2019-04-11 VITALS
SYSTOLIC BLOOD PRESSURE: 118 MMHG | HEART RATE: 58 BPM | DIASTOLIC BLOOD PRESSURE: 64 MMHG | OXYGEN SATURATION: 99 % | TEMPERATURE: 97.9 F

## 2019-04-11 PROCEDURE — G0157 HHC PT ASSISTANT EA 15: HCPCS

## 2019-04-11 PROCEDURE — G0152 HHCP-SERV OF OT,EA 15 MIN: HCPCS

## 2019-04-12 ENCOUNTER — HOME CARE VISIT (OUTPATIENT)
Dept: SCHEDULING | Facility: HOME HEALTH | Age: 34
End: 2019-04-12
Payer: MEDICAID

## 2019-04-12 VITALS
OXYGEN SATURATION: 99 % | SYSTOLIC BLOOD PRESSURE: 116 MMHG | HEART RATE: 81 BPM | TEMPERATURE: 98.4 F | DIASTOLIC BLOOD PRESSURE: 60 MMHG

## 2019-04-12 PROCEDURE — G0157 HHC PT ASSISTANT EA 15: HCPCS

## 2019-04-12 NOTE — TELEPHONE ENCOUNTER
Attempted to contact patient at  number, no answer. Lvm for patient to return call to office at [phone]. Will continue to try to contact patient.

## 2019-04-15 ENCOUNTER — HOME CARE VISIT (OUTPATIENT)
Dept: SCHEDULING | Facility: HOME HEALTH | Age: 34
End: 2019-04-15
Payer: MEDICAID

## 2019-04-15 VITALS
HEART RATE: 80 BPM | TEMPERATURE: 97.8 F | DIASTOLIC BLOOD PRESSURE: 70 MMHG | OXYGEN SATURATION: 99 % | SYSTOLIC BLOOD PRESSURE: 118 MMHG

## 2019-04-15 PROCEDURE — G0157 HHC PT ASSISTANT EA 15: HCPCS

## 2019-04-16 ENCOUNTER — HOME CARE VISIT (OUTPATIENT)
Dept: HOME HEALTH SERVICES | Facility: HOME HEALTH | Age: 34
End: 2019-04-16
Payer: MEDICAID

## 2019-04-16 NOTE — TELEPHONE ENCOUNTER
Attempted to contact patient at  number, no answer. Lvm for patient to return call to office at [phone]. I have been unable to reach this patient by phone. A letter is being sent to the last known home address.

## 2019-04-17 ENCOUNTER — HOME CARE VISIT (OUTPATIENT)
Dept: SCHEDULING | Facility: HOME HEALTH | Age: 34
End: 2019-04-17
Payer: MEDICAID

## 2019-04-17 VITALS
SYSTOLIC BLOOD PRESSURE: 110 MMHG | DIASTOLIC BLOOD PRESSURE: 64 MMHG | TEMPERATURE: 98.1 F | HEART RATE: 69 BPM | OXYGEN SATURATION: 100 %

## 2019-04-17 PROCEDURE — G0157 HHC PT ASSISTANT EA 15: HCPCS

## 2019-04-18 ENCOUNTER — HOME CARE VISIT (OUTPATIENT)
Dept: SCHEDULING | Facility: HOME HEALTH | Age: 34
End: 2019-04-18
Payer: MEDICAID

## 2019-04-18 VITALS
TEMPERATURE: 98 F | DIASTOLIC BLOOD PRESSURE: 83 MMHG | SYSTOLIC BLOOD PRESSURE: 128 MMHG | HEART RATE: 73 BPM | OXYGEN SATURATION: 99 %

## 2019-04-18 VITALS
OXYGEN SATURATION: 99 % | HEART RATE: 73 BPM | SYSTOLIC BLOOD PRESSURE: 128 MMHG | DIASTOLIC BLOOD PRESSURE: 83 MMHG | TEMPERATURE: 98 F

## 2019-04-18 PROCEDURE — G0157 HHC PT ASSISTANT EA 15: HCPCS

## 2019-04-18 PROCEDURE — G0158 HHC OT ASSISTANT EA 15: HCPCS

## 2019-04-19 ENCOUNTER — HOME CARE VISIT (OUTPATIENT)
Dept: SCHEDULING | Facility: HOME HEALTH | Age: 34
End: 2019-04-19
Payer: MEDICAID

## 2019-04-19 VITALS
TEMPERATURE: 98.2 F | OXYGEN SATURATION: 99 % | DIASTOLIC BLOOD PRESSURE: 79 MMHG | HEART RATE: 65 BPM | SYSTOLIC BLOOD PRESSURE: 126 MMHG

## 2019-04-19 PROCEDURE — G0158 HHC OT ASSISTANT EA 15: HCPCS

## 2019-04-22 ENCOUNTER — HOME CARE VISIT (OUTPATIENT)
Dept: HOME HEALTH SERVICES | Facility: HOME HEALTH | Age: 34
End: 2019-04-22
Payer: MEDICAID

## 2019-04-22 ENCOUNTER — HOSPITAL ENCOUNTER (OUTPATIENT)
Dept: MRI IMAGING | Age: 34
Discharge: HOME | End: 2019-04-22
Attending: PSYCHIATRY & NEUROLOGY
Payer: MEDICAID

## 2019-04-22 ENCOUNTER — HOME CARE VISIT (OUTPATIENT)
Dept: SCHEDULING | Facility: HOME HEALTH | Age: 34
End: 2019-04-22
Payer: MEDICAID

## 2019-04-22 VITALS
DIASTOLIC BLOOD PRESSURE: 70 MMHG | TEMPERATURE: 97.7 F | OXYGEN SATURATION: 100 % | HEART RATE: 60 BPM | SYSTOLIC BLOOD PRESSURE: 125 MMHG

## 2019-04-22 VITALS
SYSTOLIC BLOOD PRESSURE: 129 MMHG | TEMPERATURE: 98.1 F | HEART RATE: 72 BPM | OXYGEN SATURATION: 98 % | DIASTOLIC BLOOD PRESSURE: 83 MMHG

## 2019-04-22 VITALS — WEIGHT: 145 LBS | BODY MASS INDEX: 19.67 KG/M2

## 2019-04-22 DIAGNOSIS — G35 MULTIPLE SCLEROSIS (HCC): ICD-10-CM

## 2019-04-22 PROCEDURE — 74011636320 HC RX REV CODE- 636/320

## 2019-04-22 PROCEDURE — G0157 HHC PT ASSISTANT EA 15: HCPCS

## 2019-04-22 PROCEDURE — G0152 HHCP-SERV OF OT,EA 15 MIN: HCPCS

## 2019-04-22 PROCEDURE — 70553 MRI BRAIN STEM W/O & W/DYE: CPT

## 2019-04-22 PROCEDURE — A9575 INJ GADOTERATE MEGLUMI 0.1ML: HCPCS

## 2019-04-22 RX ADMIN — GADOTERATE MEGLUMINE 15 ML: 376.9 INJECTION INTRAVENOUS at 19:40

## 2019-04-23 ENCOUNTER — HOME CARE VISIT (OUTPATIENT)
Dept: SCHEDULING | Facility: HOME HEALTH | Age: 34
End: 2019-04-23
Payer: MEDICAID

## 2019-04-23 VITALS
SYSTOLIC BLOOD PRESSURE: 120 MMHG | DIASTOLIC BLOOD PRESSURE: 68 MMHG | HEART RATE: 69 BPM | TEMPERATURE: 98 F | OXYGEN SATURATION: 100 %

## 2019-04-23 PROCEDURE — G0157 HHC PT ASSISTANT EA 15: HCPCS

## 2019-04-25 ENCOUNTER — HOME CARE VISIT (OUTPATIENT)
Dept: SCHEDULING | Facility: HOME HEALTH | Age: 34
End: 2019-04-25
Payer: MEDICAID

## 2019-04-25 VITALS
DIASTOLIC BLOOD PRESSURE: 61 MMHG | HEART RATE: 69 BPM | SYSTOLIC BLOOD PRESSURE: 128 MMHG | OXYGEN SATURATION: 98 % | TEMPERATURE: 98.3 F

## 2019-04-25 VITALS
HEART RATE: 65 BPM | TEMPERATURE: 98.5 F | OXYGEN SATURATION: 98 % | SYSTOLIC BLOOD PRESSURE: 130 MMHG | DIASTOLIC BLOOD PRESSURE: 74 MMHG

## 2019-04-25 PROCEDURE — G0152 HHCP-SERV OF OT,EA 15 MIN: HCPCS

## 2019-04-25 PROCEDURE — G0153 HHCP-SVS OF S/L PATH,EA 15MN: HCPCS

## 2019-04-26 ENCOUNTER — HOME CARE VISIT (OUTPATIENT)
Dept: SCHEDULING | Facility: HOME HEALTH | Age: 34
End: 2019-04-26
Payer: MEDICAID

## 2019-04-26 VITALS
TEMPERATURE: 98.5 F | HEART RATE: 58 BPM | SYSTOLIC BLOOD PRESSURE: 105 MMHG | DIASTOLIC BLOOD PRESSURE: 62 MMHG | OXYGEN SATURATION: 98 %

## 2019-04-26 PROCEDURE — G0151 HHCP-SERV OF PT,EA 15 MIN: HCPCS

## 2019-04-29 ENCOUNTER — HOME CARE VISIT (OUTPATIENT)
Dept: SCHEDULING | Facility: HOME HEALTH | Age: 34
End: 2019-04-29
Payer: MEDICAID

## 2019-04-29 PROCEDURE — G0153 HHCP-SVS OF S/L PATH,EA 15MN: HCPCS

## 2019-04-30 VITALS — SYSTOLIC BLOOD PRESSURE: 123 MMHG | DIASTOLIC BLOOD PRESSURE: 71 MMHG | TEMPERATURE: 97.4 F | HEART RATE: 49 BPM

## 2019-05-01 ENCOUNTER — HOME CARE VISIT (OUTPATIENT)
Dept: SCHEDULING | Facility: HOME HEALTH | Age: 34
End: 2019-05-01
Payer: MEDICAID

## 2019-05-01 PROCEDURE — G0153 HHCP-SVS OF S/L PATH,EA 15MN: HCPCS

## 2019-05-02 VITALS
DIASTOLIC BLOOD PRESSURE: 71 MMHG | OXYGEN SATURATION: 96 % | HEART RATE: 59 BPM | SYSTOLIC BLOOD PRESSURE: 134 MMHG | TEMPERATURE: 97 F

## 2019-05-03 ENCOUNTER — HOME CARE VISIT (OUTPATIENT)
Dept: SCHEDULING | Facility: HOME HEALTH | Age: 34
End: 2019-05-03
Payer: MEDICAID

## 2019-05-03 PROCEDURE — G0153 HHCP-SVS OF S/L PATH,EA 15MN: HCPCS

## 2019-05-06 ENCOUNTER — HOME CARE VISIT (OUTPATIENT)
Dept: SCHEDULING | Facility: HOME HEALTH | Age: 34
End: 2019-05-06
Payer: MEDICAID

## 2019-05-06 VITALS
DIASTOLIC BLOOD PRESSURE: 76 MMHG | OXYGEN SATURATION: 99 % | SYSTOLIC BLOOD PRESSURE: 140 MMHG | TEMPERATURE: 97.7 F | HEART RATE: 93 BPM

## 2019-05-06 PROCEDURE — G0153 HHCP-SVS OF S/L PATH,EA 15MN: HCPCS

## 2019-05-08 ENCOUNTER — HOME CARE VISIT (OUTPATIENT)
Dept: SCHEDULING | Facility: HOME HEALTH | Age: 34
End: 2019-05-08
Payer: MEDICAID

## 2019-05-08 PROCEDURE — G0153 HHCP-SVS OF S/L PATH,EA 15MN: HCPCS

## 2019-05-10 ENCOUNTER — HOME CARE VISIT (OUTPATIENT)
Dept: SCHEDULING | Facility: HOME HEALTH | Age: 34
End: 2019-05-10
Payer: MEDICAID

## 2019-05-10 VITALS
TEMPERATURE: 98.8 F | DIASTOLIC BLOOD PRESSURE: 77 MMHG | SYSTOLIC BLOOD PRESSURE: 111 MMHG | OXYGEN SATURATION: 97 % | HEART RATE: 86 BPM

## 2019-05-10 PROCEDURE — G0153 HHCP-SVS OF S/L PATH,EA 15MN: HCPCS

## 2019-05-13 ENCOUNTER — HOME CARE VISIT (OUTPATIENT)
Dept: SCHEDULING | Facility: HOME HEALTH | Age: 34
End: 2019-05-13
Payer: MEDICAID

## 2019-05-13 VITALS
SYSTOLIC BLOOD PRESSURE: 110 MMHG | OXYGEN SATURATION: 99 % | DIASTOLIC BLOOD PRESSURE: 58 MMHG | HEART RATE: 90 BPM | TEMPERATURE: 98.7 F

## 2019-05-13 VITALS
OXYGEN SATURATION: 93 % | HEART RATE: 59 BPM | SYSTOLIC BLOOD PRESSURE: 112 MMHG | TEMPERATURE: 98.3 F | DIASTOLIC BLOOD PRESSURE: 70 MMHG

## 2019-05-13 PROCEDURE — G0153 HHCP-SVS OF S/L PATH,EA 15MN: HCPCS

## 2019-05-15 ENCOUNTER — HOME CARE VISIT (OUTPATIENT)
Dept: SCHEDULING | Facility: HOME HEALTH | Age: 34
End: 2019-05-15
Payer: MEDICAID

## 2019-05-15 PROCEDURE — G0153 HHCP-SVS OF S/L PATH,EA 15MN: HCPCS

## 2019-05-16 VITALS — SYSTOLIC BLOOD PRESSURE: 130 MMHG | DIASTOLIC BLOOD PRESSURE: 80 MMHG | TEMPERATURE: 97.8 F

## 2019-05-20 ENCOUNTER — HOME CARE VISIT (OUTPATIENT)
Dept: SCHEDULING | Facility: HOME HEALTH | Age: 34
End: 2019-05-20
Payer: MEDICAID

## 2019-05-20 PROCEDURE — 400014 HH F/U

## 2019-05-20 PROCEDURE — G0153 HHCP-SVS OF S/L PATH,EA 15MN: HCPCS

## 2019-05-21 VITALS
TEMPERATURE: 97.9 F | DIASTOLIC BLOOD PRESSURE: 70 MMHG | SYSTOLIC BLOOD PRESSURE: 124 MMHG | HEART RATE: 69 BPM | OXYGEN SATURATION: 98 %

## 2019-05-22 ENCOUNTER — HOME CARE VISIT (OUTPATIENT)
Dept: SCHEDULING | Facility: HOME HEALTH | Age: 34
End: 2019-05-22
Payer: MEDICAID

## 2019-05-22 ENCOUNTER — TELEPHONE (OUTPATIENT)
Dept: FAMILY MEDICINE CLINIC | Age: 34
End: 2019-05-22

## 2019-05-22 VITALS
SYSTOLIC BLOOD PRESSURE: 125 MMHG | DIASTOLIC BLOOD PRESSURE: 71 MMHG | TEMPERATURE: 98.9 F | HEART RATE: 71 BPM | OXYGEN SATURATION: 93 %

## 2019-05-22 PROCEDURE — G0153 HHCP-SVS OF S/L PATH,EA 15MN: HCPCS

## 2019-05-22 NOTE — TELEPHONE ENCOUNTER
Please call Ms. Robbie Connors from [address] [place] [address],[address] therapy for this pt and Home health order b/c the patient is falling.       [phone]

## 2019-05-22 NOTE — TELEPHONE ENCOUNTER
Called Nurse Jhon Murdock back from [facility]. She reported patient had a fall on 5-6-2019 and requesting for verbal order for physical therapy to do post fall assessment for advise.

## 2019-05-22 NOTE — TELEPHONE ENCOUNTER
Had a fall, going to have PT come out for post fall assessment. No injuries. Nicolette Lloyd reports his cognitive memory impairment is severe. She is going to fax me a copy of the tests.    Per office notes dr. Juhi Santos seems to be aware of his cognitive impairments

## 2019-05-26 ENCOUNTER — HOME CARE VISIT (OUTPATIENT)
Dept: SCHEDULING | Facility: HOME HEALTH | Age: 34
End: 2019-05-26
Payer: MEDICAID

## 2019-05-26 VITALS
DIASTOLIC BLOOD PRESSURE: 70 MMHG | HEART RATE: 71 BPM | TEMPERATURE: 97.8 F | RESPIRATION RATE: 17 BRPM | SYSTOLIC BLOOD PRESSURE: 130 MMHG

## 2019-05-26 PROCEDURE — G0151 HHCP-SERV OF PT,EA 15 MIN: HCPCS

## 2019-05-27 ENCOUNTER — TELEPHONE (OUTPATIENT)
Dept: INTERNAL MEDICINE CLINIC | Age: 34
End: 2019-05-27

## 2019-05-29 ENCOUNTER — HOME CARE VISIT (OUTPATIENT)
Dept: SCHEDULING | Facility: HOME HEALTH | Age: 34
End: 2019-05-29
Payer: MEDICAID

## 2019-05-29 PROCEDURE — G0153 HHCP-SVS OF S/L PATH,EA 15MN: HCPCS

## 2019-05-29 NOTE — TELEPHONE ENCOUNTER
Attempted to contact pt at  number, no answer. Lvm for pt to return call to office at [phone] . Will continue to try to contact pt. If patient calls back Lex Willett.

## 2019-05-30 VITALS
SYSTOLIC BLOOD PRESSURE: 128 MMHG | DIASTOLIC BLOOD PRESSURE: 64 MMHG | TEMPERATURE: 97.8 F | OXYGEN SATURATION: 99 % | HEART RATE: 73 BPM

## 2019-05-31 ENCOUNTER — HOME CARE VISIT (OUTPATIENT)
Dept: SCHEDULING | Facility: HOME HEALTH | Age: 34
End: 2019-05-31
Payer: MEDICAID

## 2019-05-31 PROCEDURE — G0153 HHCP-SVS OF S/L PATH,EA 15MN: HCPCS

## 2019-06-03 ENCOUNTER — HOME CARE VISIT (OUTPATIENT)
Dept: SCHEDULING | Facility: HOME HEALTH | Age: 34
End: 2019-06-03
Payer: MEDICAID

## 2019-06-03 VITALS
OXYGEN SATURATION: 98 % | HEART RATE: 75 BPM | SYSTOLIC BLOOD PRESSURE: 132 MMHG | DIASTOLIC BLOOD PRESSURE: 70 MMHG | TEMPERATURE: 98 F

## 2019-06-03 VITALS
TEMPERATURE: 98.2 F | OXYGEN SATURATION: 98 % | HEART RATE: 74 BPM | SYSTOLIC BLOOD PRESSURE: 142 MMHG | DIASTOLIC BLOOD PRESSURE: 60 MMHG

## 2019-06-03 PROCEDURE — G0153 HHCP-SVS OF S/L PATH,EA 15MN: HCPCS

## 2019-06-05 ENCOUNTER — HOME CARE VISIT (OUTPATIENT)
Dept: SCHEDULING | Facility: HOME HEALTH | Age: 34
End: 2019-06-05
Payer: MEDICAID

## 2019-06-05 PROCEDURE — G0153 HHCP-SVS OF S/L PATH,EA 15MN: HCPCS

## 2019-06-06 VITALS — SYSTOLIC BLOOD PRESSURE: 124 MMHG | TEMPERATURE: 98.5 F | DIASTOLIC BLOOD PRESSURE: 60 MMHG

## 2019-10-08 ENCOUNTER — HOSPITAL ENCOUNTER (OUTPATIENT)
Dept: MRI IMAGING | Age: 34
Discharge: HOME | End: 2019-10-08
Attending: PHYSICIAN ASSISTANT
Payer: MEDICAID

## 2019-10-08 VITALS — BODY MASS INDEX: 18.99 KG/M2 | WEIGHT: 140 LBS

## 2019-10-08 DIAGNOSIS — G35 MULTIPLE SCLEROSIS (HCC): ICD-10-CM

## 2019-10-08 PROCEDURE — 74011636320 HC RX REV CODE- 636/320

## 2019-10-08 PROCEDURE — A9575 INJ GADOTERATE MEGLUMI 0.1ML: HCPCS

## 2019-10-08 PROCEDURE — 70553 MRI BRAIN STEM W/O & W/DYE: CPT

## 2019-10-08 RX ADMIN — GADOTERATE MEGLUMINE 13 ML: 376.9 INJECTION INTRAVENOUS at 20:43

## 2019-12-13 ENCOUNTER — HOSPITAL ENCOUNTER (OUTPATIENT)
Dept: ULTRASOUND IMAGING | Age: 34
Discharge: HOME | End: 2019-12-13
Attending: UROLOGY
Payer: MEDICAID

## 2019-12-13 DIAGNOSIS — N31.9 NEUROGENIC BLADDER: ICD-10-CM

## 2019-12-13 PROCEDURE — 76770 US EXAM ABDO BACK WALL COMP: CPT

## 2020-04-23 ENCOUNTER — HOSPITAL ENCOUNTER (OUTPATIENT)
Dept: MRI IMAGING | Age: 35
Discharge: HOME | End: 2020-04-23
Attending: PSYCHIATRY & NEUROLOGY
Payer: MEDICAID

## 2020-04-23 DIAGNOSIS — G35 MULTIPLE SCLEROSIS (HCC): ICD-10-CM

## 2020-04-23 PROCEDURE — 70551 MRI BRAIN STEM W/O DYE: CPT

## 2020-10-12 ENCOUNTER — TRANSCRIBE ORDER (OUTPATIENT)
Dept: SCHEDULING | Age: 35
End: 2020-10-12

## 2020-10-12 DIAGNOSIS — G35 MULTIPLE SCLEROSIS (HCC): Primary | ICD-10-CM

## 2020-10-29 ENCOUNTER — HOSPITAL ENCOUNTER (OUTPATIENT)
Dept: MRI IMAGING | Age: 35
Discharge: HOME | End: 2020-10-29
Attending: PSYCHIATRY & NEUROLOGY
Payer: MEDICAID

## 2020-10-29 VITALS — WEIGHT: 164 LBS | BODY MASS INDEX: 22.24 KG/M2

## 2020-10-29 DIAGNOSIS — G35 MULTIPLE SCLEROSIS (HCC): ICD-10-CM

## 2020-10-29 PROCEDURE — 70553 MRI BRAIN STEM W/O & W/DYE: CPT

## 2020-10-29 PROCEDURE — 74011636320 HC RX REV CODE- 636/320

## 2020-10-29 PROCEDURE — A9575 INJ GADOTERATE MEGLUMI 0.1ML: HCPCS

## 2020-10-29 RX ADMIN — GADOTERATE MEGLUMINE 15 ML: 376.9 INJECTION INTRAVENOUS at 09:17

## 2020-12-21 ENCOUNTER — TRANSCRIBE ORDER (OUTPATIENT)
Dept: SCHEDULING | Age: 35
End: 2020-12-21

## 2020-12-21 DIAGNOSIS — G35 MULTIPLE SCLEROSIS (HCC): Primary | ICD-10-CM

## 2021-01-09 ENCOUNTER — HOSPITAL ENCOUNTER (OUTPATIENT)
Dept: MRI IMAGING | Age: 36
Discharge: HOME | End: 2021-01-09
Attending: NURSE PRACTITIONER
Payer: MEDICAID

## 2021-01-09 VITALS — WEIGHT: 156 LBS | BODY MASS INDEX: 21.16 KG/M2

## 2021-01-09 DIAGNOSIS — G35 MULTIPLE SCLEROSIS (HCC): ICD-10-CM

## 2021-01-09 PROCEDURE — 74011636320 HC RX REV CODE- 636/320

## 2021-01-09 PROCEDURE — 72156 MRI NECK SPINE W/O & W/DYE: CPT

## 2021-01-09 PROCEDURE — A9575 INJ GADOTERATE MEGLUMI 0.1ML: HCPCS

## 2021-01-09 PROCEDURE — 70553 MRI BRAIN STEM W/O & W/DYE: CPT

## 2021-01-09 RX ADMIN — GADOTERATE MEGLUMINE 15 ML: 376.9 INJECTION INTRAVENOUS at 11:41

## 2021-05-05 ENCOUNTER — TRANSCRIBE ORDER (OUTPATIENT)
Dept: SCHEDULING | Age: 36
End: 2021-05-05

## 2021-05-05 DIAGNOSIS — G35 MULTIPLE SCLEROSIS (HCC): Primary | ICD-10-CM

## 2022-03-01 ENCOUNTER — HOSPITAL ENCOUNTER (OUTPATIENT)
Dept: PHYSICAL THERAPY | Age: 37
Discharge: HOME | End: 2022-03-01
Payer: MEDICARE

## 2022-03-01 PROCEDURE — 97162 PT EVAL MOD COMPLEX 30 MIN: CPT

## 2022-03-01 NOTE — PROGRESS NOTES
[address] [place] [address] PHYSICAL THERAPY  [address] [place] [address] Angeles Bryson, [address] - Phone: [phone]  Fax: [phone] / [facility]  Patient Name: Paul Ledezma : 1985   Medical   Diagnosis: Neck pain [M54.2]  Gait instability [R26.81]  Multiple sclerosis [G35] Treatment Diagnosis: Neck pain [M54.2]  Gait instability [R26.81]  Multiple sclerosis [G35]   Onset Date:      Referral Source: Josiane Chung NP Start of Care [facility] [place]): 3/1/2022   Prior Hospitalization: See medical history Provider #: [id number]   Prior Level of Function: Diagnosed with MS , CVA , prior PT; ambulating with walking stick or without assistive device   Comorbidities: CVA 2019   Medications: Verified on Patient Summary List   The Plan of Care and following information is based on the information from the initial evaluation.   ===========================================================================================  Assessment / key information:  Patient is a 43 y.o. male who presents with complaints of neck pain, back pain, right LE pain, dizziness/vertigo, imbalance and difficulty with functional mobility/gait. Patient presents ambulating with walking stick with wide-based ataxic gait and increased B hip/knee flexion. Patient demonstrates mild postural impairments, decreased C/S ROM, decreased right LE strength, impaired saccades/smooth pursuit/VOR, impaired motor control B LE, decreased activity tolerance and impaired functional mobility/gait. Tinetti = 6/28, indicating high risk of falling. Patient would benefit from skilled PT services to address these issues and improve function.   Thank you for this referral.    FOTO: 53/100  ==========================================================================================  Eval Complexity: History: HIGH Complexity :3+ comorbidities / personal factors will impact the outcome/ POC Exam:HIGH Complexity : 4+ Standardized tests and measures addressing body structure, function, activity limitation and / or participation in recreation  Presentation: MEDIUM Complexity : Evolving with changing characteristics  Clinical Decision Making:MEDIUM Complexity : FOTO score of 26-74Overall Complexity:MEDIUM    Problem List: pain affecting function, decrease ROM, decrease strength, impaired gait/ balance, decrease ADL/ functional abilitiies, decrease activity tolerance, decrease flexibility/ joint mobility and decrease transfer abilities   Treatment Plan may include any combination of the following: Therapeutic exercise, Therapeutic activities, Neuromuscular re-education, Physical agent/modality, Gait/balance training, Manual therapy, Patient education, Self Care training, Functional mobility training, Home safety training and Stair training  Patient / Family readiness to learn indicated by: asking questions, trying to perform skills and interest  Persons(s) to be included in education: patient (P) and family support person (FSP);list spouse  Barriers to Learning/Limitations: yes;  cognitive  Measures taken:    Patient Goal (s): \"Less pain and dizziness. \"   Patient self reported health status: fair  Rehabilitation Potential: good   Short Term Goals: To be accomplished in  2-4  weeks:  1. Patient will demonstrate compliance with HEP. 2. Patient will score greater than or equal to 8/28 on Tinetti to indicate improved balance/decreased fall risk. 3. Patient will maintain stance eyes open 30\" without UE support to increase safety with ADLs.  Long Term Goals: To be accomplished in  4-8  weeks:  1. Patient will demonstrate independence with HEP. 2. Patient will score greater than or equal to 10/28 on Tinetti to indicate improved balance/decreased fall risk. 3. Patient will demonstrate stance eyes closed 30\" without UE support to increase safety with ADLs.   4. Patient will score greater than or equal to 56/100 on FOTO to indicate improved function. Frequency / Duration:   Patient to be seen  2  times per week for 4-8  weeks: (Patient requesting transfer to [facility] clinic due to schedule.)  Patient / Caregiver education and instruction: activity modification    Therapist Signature: Elijah Hicks PT Date: 1/1/6283   Certification Period: 3/1/2022-5/31/2022 Time: 5:07 PM   ===========================================================================================    To ensure your patient receives the highest quality care and to avoid disruption in therapy please sign and return this plan of care within 21 days. Per Medicaid guidelines if the plan of care is not received within 21 days the patient's care must be put on hold until signed. Per Medicaid guidelines, plan of care must be signed by physician. I certify that the above Physical Therapy Services are being furnished while the patient is under my care. I agree with the treatment plan and certify that this therapy is necessary. Physician Signature:        Date:       Time:     Larry Gowers, NP  Please sign and return to [facility] [employer] or you may fax the signed copy to [phone]. Thank you.

## 2022-03-01 NOTE — PROGRESS NOTES
PHYSICAL THERAPY - DAILY TREATMENT NOTE    Patient Name: Kathy Cornejo        Date: 3/1/2022  : 1985   YES Patient  Verified  Visit #:   1     Insurance: Payor: Vito Aldridges / Plan: [facility] [facility] / Product Type: Managed Care Medicare /      In time: 5:05 Out time: 5:50   Total Treatment Time: 50     Medicare/BCBS [place] Time Tracking (below)   Total Timed Codes (min):  0 1:1 Treatment Time:  0     TREATMENT AREA =  MS - ataxia, dizziness, neck pain    SUBJECTIVE    Pain Level (on 0 to 10 scale):  5  / 10 right LE   Medication Changes/New allergies or changes in medical history, any new surgeries or procedures? NO    If yes, update Summary List   Subjective Functional Status/Changes:  []  No changes reported     Pt reports neck and back pain as well as right LE pain, difficulty with balance and walking. Pt reports that he was diagnosed with MS about 3 years ago. Pt reports B LE weakness. Pt reports that he uses walking stick when he is having pain, but doesn't always use it. Pt reports that he also has a RW and a rollator walker, but he does not usually use them. Pt denies falls. Pt reports intermittent dizziness/vertigo, which occurs with standing up quickly or moving quickly. Pt reports that he lives in 1-story duplex with 4 CHRISTOPHER with B HR. Pt reports that he has a tub shower with shower chair and grab bar. Pt reports that he had PT ~ 1 year ago for same issues, but reports that he did not have dizziness at that time.             OBJECTIVE    Physical Therapy Evaluation - Neurologic    Posture: [] Poor    [x] Fair    [] Good    Describe: Minimally protracted head/shoulders, increased thoracic kyphosis    Gait: [] Normal    [x] Abnormal    Device: Walking stick     Describe: Wide-based ataxic gait pattern with increased B hip/knee flexion    ROM:                             AROM    PROM   Shoulder Left Right Left Right   Flex [facility] WFL     Ext [facility] WFL     ABD [facility] ER [facility] WFL     IR [facility] WFL              AROM    PROM   Knee Left Right Left Right   Ext [facility]     Flex [facility] WFL               AROM                           PROM  Hip Left Right Left Right   Flex [facility] WFL     Ext [facility] WFL     ABD [facility]     ER WFL WFL     IR WFL WFL                                              AROM      PROM   Ankle Left Right Left Right   Ext [facility]     Flex [facility] WFL       Strength (MMT):  Shoulder L (1-5) R (1-5)   Shoulder Flexion 5 5   Shoulder Ext NT NT   Shoulder ABD 5 5   Shoulder ADD NT NT   Shoulder IR 5 5   Shoulder ER 5 5                                              B elbow/wrist/hand strength grossly 5/5    Hip L (1-5) R (1-5)   Hip Flexion 5 4   Hip Ext 5 4   Hip ABD 5 4   Hip ADD 5 4   Hip ER NT NT   Hip IR NT NT     Knee L (1-5) R (1-5)   Knee Flexion 5 4   Knee Extension 5 4   Ankle PF 5 4   Ankle DF 5 4   Other       Tone: WNL    Motor Control: Impaired B LE    Sensation: NT    Reflexes: [x] Not Tested   Left Right   Biceps (C5)     Brachioradiais (C6)     Triceps (C7)     Knee Jerk (L4)     Ankle Jerk (SI)       Balance/ Equilibrium:              Left            Right  Tracks Across Midline YES  YES   Reaches Across Midline YES YES         Sitting Balance: Static:  [] Good    [x] Fair    [] Poor     Dynamic:   [] Good    [x] Fair    [] Poor        Standing Balance: Static:   [] Good    [] Fair    [x] Poor     Dynamic:   [] Good    [] Fair    [x] Poor        Protective Extension:  [x] Present    [] Delayed    [] Absent        Single Leg Stance: NT         Eyes Open  Eyes Closed   L  L    R  R        Functional Mobility      Bed Mobility:      Scooting: NT       Rolling: NT       Sit-Supine: NT      Transfers:       Sit-Stand: min A       Floor-Stand: NT      Gait:       Tandem: NT       Backwards: NT       Braiding: NT      Elevations:       Curbs: NT       Ramps: NT       Stairs: NT    Behavior: [x] Cooperative    [] Impulsive    [] Agitated    [] Perseverative    [] Confused   Oriented x: 4    Cognition: [x] One Step Commands   [] Multiple Commands   [] Displays Neglect [] R  [] L    Other:       Impaired Judgement: [x] Y    [] N      Impaired Vision:  [] Y    [x] N      Safety Awareness Deficits  [x] Y    [] N      Impaired Hearing  [] Y    [x] N      Able to Express Needs [x] Y    [] N    Optional Tests:       Dynamic Gait Index (24pt scale): Functional Gait Assessment (30pt scale):       Bishop Balance Scale (56pt scale): Other test /comments:  C/S flex = 60, ext = 30 (+ dizziness), right SB = 45, left SB = 45, right rot = 55, left rot = 45 (+ dizziness)    Oculomotor Tests: (Fixation Not Blocked)       Ocular ROM:   [x] WFL    [] Limited    Describe:       Spontaneous Nystag. [x] Neg     [] Pos    [] Left    [] Right       Gaze Holding Nystag.  [x] Neg     [] Pos    [] Left    [] Right        Smooth Pursuit  [] Neg     [x] Pos    [] Left    [] Right        Saccades   [] Neg     [x] Pos    [] Left    [] Right        VOR - Slow Head Mvmt [] Neg     [x] Pos    [] Left    [] Right        VOR - Fast Head Mvmt [] Neg     [] Pos    [] Left    [] Right        Head Thrust  [] Neg     [] Pos    [] Left    [] Right        Static Visual Acuity [] Neg     [] Pos    [] Left    [] Right        Dynamic Visual Acuity [] Neg     [] Pos    [] Left    [] Right     Tinetti = 6/28, high risk of falling    During eval min Patient Education:  NO  Reviewed HEP   []  Progressed/Changed HEP based on:   Discussed POC     Other Objective/Functional Measures:    See eval         Post Treatment Pain Level (on 0 to 10) scale:   5  / 10     ASSESSMENT    Assessment/Changes in Function:     See plan of care    Justification for Eval Code Complexity:  Patient History : HIGH - CVA 2019, diagnosed with MS 2019  Examination HIGH - see objective  Clinical Presentation: MEDIUM  Clinical Decision Making : MEDIUM - FOTO 53/100       []  See Progress Note/Recertification   Patient will continue to benefit from skilled PT services: see plan of care   Progress toward goals / Updated goals:    See plan of care     PLAN    [x]  Upgrade activities as tolerated YES Continue plan of care   []  Discharge due to :    []  Other:      Therapist: Gris Easley PT    Date: 3/1/2022 Time: 5:07 PM

## 2022-03-09 ENCOUNTER — HOSPITAL ENCOUNTER (OUTPATIENT)
Dept: PHYSICAL THERAPY | Age: 37
Discharge: HOME | End: 2022-03-09
Payer: MEDICARE

## 2022-03-09 PROCEDURE — 97110 THERAPEUTIC EXERCISES: CPT

## 2022-03-09 PROCEDURE — 97116 GAIT TRAINING THERAPY: CPT

## 2022-03-09 PROCEDURE — 97112 NEUROMUSCULAR REEDUCATION: CPT

## 2022-03-09 NOTE — PROGRESS NOTES
PT DAILY TREATMENT NOTE     Patient Name: Sulma Gonzales  Date:3/9/2022  : 1985  [x]  Patient  Verified  Payor: Hetal Lehman / Plan: [employer]ICARE [employer]VANTAGE / Product Type: Managed Care Medicare /    In time: 5:08 pm         Out time: 5:52 pm  Total Treatment Time (min): 44  Total Timed Codes (min): 44  1:1 Treatment Time (min): 39  Visit #: 2 of      Treatment Area: Neck pain [M54.2]  Gait instability [R26.81]  Multiple sclerosis [G35]    SUBJECTIVE  Pain Level (0-10 scale): 6  Any medication changes, allergies to medications, adverse drug reactions, diagnosis change, or new procedure performed?: [x] No    [] Yes (see summary sheet for update)  Subjective functional status/changes:   [] No changes reported  Patient reports no difficulty with current HEP as he has been doing a little bit from the exercises provided at last session and from previous PT over the years.     OBJECTIVE  Modality rationale: PD     Min Type Additional Details    [] Estim: []Att   []Unatt                  []IFC  []Premod                                            []NMES    []Other:  []w/ice   []w/heat  Position:  Location:    []  Ice     []  heat  []  Ice massage Position:  Location:   [] Skin assessment post-treatment:  []intact    []redness- no adverse reaction                                                                                 []redness - adverse reaction:     5 min Therapeutic Exercise:  [x] See flow sheet: warmup   Rationale: increase ROM to improve the patients ability to participate in therapeutic interventions    18 min Therapeutic Activity:  [x]  See flow sheet: initiated tap ups, step ups, bridges, resisted minisquats   Rationale: increase ROM, increase strength, improve coordination, improve balance and increase proprioception  to improve the patients ability to perform transfers, negotiate stairs, perform bed mobility     12 min Neuromuscular Re-education:  [x]  See flow sheet: initiated static balance, VOR in sitting   Rationale: improve coordination, improve balance and increase proprioception  to improve the patients ability to ambulate and balance in standing with reduced fall risk    9 min Gait Training:  Pre-gait; 200 feet with walking stick device on level surfaces with SBA level of assist with cueing for heel-toe patterning and maintenance of upright posture   Rationale: increase proprioception, improve balance strategies, increase strength to improve the patient's ability to ambulate with reduced fall risk          with TE min Patient Education: [x] Review HEP from IE: added VOR in sitting with card provided to patient      Other Objective/Functional Measures:   STG met. Standing balance: regular stance EO/EC 30 seconds both    Pain Level (0-10 scale) post treatment: 0    ASSESSMENT/Changes in Function:   Good tolerance to treatment today with patient req 100% verbal/tactile cueing and demo for proper form/technique with all newly introduced therex. Patient does benefit from several rest breaks to decrease intermittent c/o dizziness with completion of therapeutic interventions. Noted difficulty with motor planning and coordination for LE placement during step ups / tap ups and heel-toe patterning for gait. Patient will continue to benefit from skilled PT services to modify and progress therapeutic interventions, address functional mobility deficits, address ROM deficits, address strength deficits, analyze and address soft tissue restrictions, analyze and cue movement patterns, analyze and modify body mechanics/ergonomics, assess and modify postural abnormalities, address imbalance/dizziness and instruct in home and community integration to attain remaining goals. [x]  See Plan of Care  []  See progress note/recertification  []  See Discharge Summary         Progress towards goals / Updated goals -:  · Short Term Goals: To be accomplished in  2-4  weeks:  1.  Patient will demonstrate compliance with HEP. -Goal met; pt notes establishing HEP (3/9/22)  2. Patient will score greater than or equal to 8/28 on Tinetti to indicate improved balance/decreased fall risk. 3. Patient will maintain stance eyes open 30\" without UE support to increase safety with ADLs. -Goal met; 30\" EO without UE support, but with c/o dizziness following alleviated with seated rest break (3/9/22)  · Long Term Goals: To be accomplished in  4-8  weeks:  1. Patient will demonstrate independence with HEP. 2. Patient will score greater than or equal to 10/28 on Tinetti to indicate improved balance/decreased fall risk. 3. Patient will demonstrate stance eyes closed 30\" without UE support to increase safety with ADLs. -Goal met; patient demos stance eyes closed 30 without UE support, but with c/o dizziness following requiring seated rest break to alleviate (3/9/22)  4. Patient will score greater than or equal to 56/100 on FOTO to indicate improved function.   (Progress Note due by 4/1/22)    PLAN  [x]  Upgrade activities as tolerated     [x]  Continue plan of care  []  Update interventions per flow sheet       []  Discharge due to:_  [x]  Other: assess response to treatment     Qian Singh, PTA 3/9/2022

## 2022-03-15 ENCOUNTER — HOSPITAL ENCOUNTER (OUTPATIENT)
Dept: PHYSICAL THERAPY | Age: 37
Discharge: HOME | End: 2022-03-15
Payer: MEDICARE

## 2022-03-15 PROCEDURE — 97112 NEUROMUSCULAR REEDUCATION: CPT

## 2022-03-15 PROCEDURE — 97116 GAIT TRAINING THERAPY: CPT

## 2022-03-15 NOTE — PROGRESS NOTES
PT DAILY TREATMENT NOTE     Patient Name: Yassine Cespedes  Date:3/15/2022  : 1985  [x]  Patient  Verified  Payor: Halina Miles / Plan: [employer] / Product Type: Managed Care Medicare /    In time:2:45  Out time:3:27  Total Treatment Time (min): 36 (+6 min bathroom break)  Visit #: 3 of 8-16    Medicare/BCBS Only   Total Timed Codes (min):  36 1:1 Treatment Time:  31       Treatment Area: Neck pain [M54.2]  Gait instability [R26.81]  Multiple sclerosis [G35]    SUBJECTIVE  Pain Level (0-10 scale): 0  Any medication changes, allergies to medications, adverse drug reactions, diagnosis change, or new procedure performed?: [x] No    [] Yes (see summary sheet for update)  Subjective functional status/changes:   [] No changes reported  \"I was tired after last session but not too bad. Do you need to see my home program?\"    OBJECTIVE    5 min Therapeutic Exercise:  [x] See flow sheet :   Rationale: increase ROM and increase strength to improve the patients ability to perform gait and transfers with improved LE strength and mobility. 14 min Neuromuscular Re-education:  [x]  See flow sheet :  - Graeme step overs with tactile cues at right knee to promote controlled knee extension during single leg stance with left UE providing balance support  - Stride stance weight shifts anterior/posterior with continued tactile cues/block at right knee to prevent buckling   Rationale: increase strength, improve coordination, improve balance and increase proprioception  to improve the patients ability to perform stair negotiation and functional mobility in the home/community with improved quadriceps control and decreased falls risk. 17 min Gait Training:  3x100 feet with left walking stick  over level surfaces with CGA. Cuing for increased left step length, increased step through gait pattern, improved heel strike. Rationale: improve safety with household/community ambulation.             With [] TE   [] TA   [x] neuro   [] other: Patient Education: [x] Review HEP    [] Progressed/Changed HEP based on:   [] positioning   [] body mechanics   [] transfers   [] heat/ice application    [] other:      Other Objective/Functional Measures: -Pt notes fatigue at end of session while noting increased right quadriceps tone so ended interventions slightly early     Pain Level (0-10 scale) post treatment: 0    ASSESSMENT/Changes in Function: Moderate carryover  observed during gait training following NM Maryjane pre gait intervention in parallel bars. Patient demonstrating improved left step length which improved step through quality of gait; he continues to require verbal cues > 50% of the time during gait training to continue this increased step length. Will assess carryover at next session. Pt's session ended slightly early as he started to fatigue quickly and reported increased tone in right LE. Patient will continue to benefit from skilled PT services to modify and progress therapeutic interventions, address functional mobility deficits, address ROM deficits, address strength deficits, analyze and address soft tissue restrictions, analyze and cue movement patterns, analyze and modify body mechanics/ergonomics, assess and modify postural abnormalities and address imbalance/dizziness to attain remaining goals. []  See Plan of Care  []  See progress note/recertification  []  See Discharge Summary         Progress towards goals / Updated goals: · Short Term Goals: To be accomplished in  2-4  weeks:  1. Patient will demonstrate compliance with HEP. -Goal met; pt notes establishing HEP (3/9/22)  2. Patient will score greater than or equal to 8/28 on Tinetti to indicate improved balance/decreased fall risk. 3. Patient will maintain stance eyes open 30\" without UE support to increase safety with ADLs.  -Goal met; 30\" EO without UE support, but with c/o dizziness following alleviated with seated rest break (3/9/22)  · Long Term Goals: To be accomplished in  4-8  weeks:  1. Patient will demonstrate independence with HEP. 2. Patient will score greater than or equal to 10/28 on Tinetti to indicate improved balance/decreased fall risk. 3. Patient will demonstrate stance eyes closed 30\" without UE support to increase safety with ADLs. -Goal met; patient demos stance eyes closed 30 without UE support, but with c/o dizziness following requiring seated rest break to alleviate (3/9/22)  4. Patient will score greater than or equal to 56/100 on FOTO to indicate improved function.   (Progress Note due by 4/1/22)    PLAN  [x]  Upgrade activities as tolerated     [x]  Continue plan of care  []  Update interventions per flow sheet       []  Discharge due to:_  []  Other:_      Pilar Pals 3/15/2022  10:38 AM    Future Appointments   Date Time Provider Gabriella Benavidez   3/15/2022  2:45 PM Josette Rachel [facility]   3/16/2022  2:00 PM Josette Rachel [facility]   3/22/2022  5:00 PM Josette Rachel [facility]   3/24/2022 12:15 PM [facility] PT GHENT 2 [facility]   3/25/2022  9:00 AM Antonio Steele, [facility] SHELLY SCHED   3/29/2022  5:00 PM Josette Rachel [facility]   3/31/2022  2:00 PM Maria Fernanda Olvera, PT [facility]

## 2022-03-16 ENCOUNTER — HOSPITAL ENCOUNTER (OUTPATIENT)
Dept: PHYSICAL THERAPY | Age: 37
Discharge: HOME | End: 2022-03-16
Payer: MEDICARE

## 2022-03-16 PROCEDURE — 97116 GAIT TRAINING THERAPY: CPT

## 2022-03-16 PROCEDURE — 97112 NEUROMUSCULAR REEDUCATION: CPT

## 2022-03-16 NOTE — PROGRESS NOTES
PT DAILY TREATMENT NOTE     Patient Name: Eric Baptiste  Date:3/16/2022  : 1985  [x]  Patient  Verified  Payor: Zenia Goodman / Plan: [employer] / Product Type: Managed Care Medicare /    In time:2:01  Out time:2:48  Total Treatment Time (min): 47  Visit #: 4 of     Medicare/BCBS Only   Total Timed Codes (min):  47 1:1 Treatment Time:  47       Treatment Area: Neck pain [M54.2]  Gait instability [R26.81]  Multiple sclerosis [G35]    SUBJECTIVE  Pain Level (0-10 scale): 0  Any medication changes, allergies to medications, adverse drug reactions, diagnosis change, or new procedure performed?: [x] No    [] Yes (see summary sheet for update)  Subjective functional status/changes:   [] No changes reported  \"My right leg was really aching after last session, it felt very tight. \"    OBJECTIVE    35 min Neuromuscular Re-education:  [x]  See flow sheet :  -TKE right knee 10 x 5\" hold, 2 sets  - Stand marches with left hip flexion progressing to left LE iveth step overs with tactile cues at right knee to promote controlled knee extension. Initiated with band behind right knee to promote TKE awareness   - Stride stance weight shifts anterior/posterior with continued tactile cues/block at right knee to prevent buckling. Had patient use dowel isrrael to push into therapy resistance to promote improved weight shift to forward leg and the initiated without resistance   Rationale: increase strength, improve coordination, improve balance and increase proprioception  to improve the patients ability to perform stair negotiation and functional mobility in the home/community with improved quadriceps control and decreased falls risk. 12 min Gait Training:  4x100 feet with walking stick in left hand over level surfaces with CGA.  Cuing for increased left step length, increased step through gait pattern, improved heel strike, increased right arm swing   Rationale: improve safety with household/community ambulation. With   [] TE   [] TA   [x] neuro   [] other: Patient Education: [x] Review HEP    [] Progressed/Changed HEP based on:   [] positioning   [] body mechanics   [] transfers   [] heat/ice application    [] other:      Other Objective/Functional Measures: -Sustained rest break required following standing NM Re-Ed due to increased fatigue/dizziness     Pain Level (0-10 scale) post treatment: 0    ASSESSMENT/Changes in Function: Patient continues to be quite hesitant in placing increased weight through right LE in stride stance position, especially with anterior weight shift to right LE. He demonstrated improved ability to transfer more weight to right LE with use of pushing strategy into therapist resistance. Goal of NM Maryjane to promote improved terminal knee extension awareness. Patient will continue to benefit from skilled PT services to modify and progress therapeutic interventions, address functional mobility deficits, address ROM deficits, address strength deficits, analyze and address soft tissue restrictions, analyze and cue movement patterns, analyze and modify body mechanics/ergonomics, assess and modify postural abnormalities and address imbalance/dizziness to attain remaining goals. []  See Plan of Care  []  See progress note/recertification  []  See Discharge Summary         Progress towards goals / Updated goals: · Short Term Goals: To be accomplished in  2-4  weeks:  1. Patient will demonstrate compliance with HEP. -Goal met; pt notes establishing HEP (3/9/22)  2. Patient will score greater than or equal to 8/28 on Tinetti to indicate improved balance/decreased fall risk. 3. Patient will maintain stance eyes open 30\" without UE support to increase safety with ADLs. -Goal met; 30\" EO without UE support, but with c/o dizziness following alleviated with seated rest break (3/9/22)  · Long Term Goals: To be accomplished in  4-8  weeks:  1.  Patient will demonstrate independence with HEP. 2. Patient will score greater than or equal to 10/28 on Tinetti to indicate improved balance/decreased fall risk. Current: progressing, addressing with standing dynamic balance interventions (3/16/22)  3. Patient will demonstrate stance eyes closed 30\" without UE support to increase safety with ADLs. -Goal met; patient demos stance eyes closed 30 without UE support, but with c/o dizziness following requiring seated rest break to alleviate (3/9/22)  4. Patient will score greater than or equal to 56/100 on FOTO to indicate improved function.   (Progress Note due by 4/1/22)       PLAN  [x]  Upgrade activities as tolerated     [x]  Continue plan of care  []  Update interventions per flow sheet       []  Discharge due to:_  []  Other:_      Harper Zhao 3/16/2022  9:07 AM    Future Appointments   Date Time Provider Gabriella Benavidez   3/16/2022  2:00 PM Jaki Moniqueu [facility]   3/22/2022  5:00 PM Jaki Moniqueu [facility]   3/24/2022 12:15 PM [facility] PT GHENT 2 [facility]   3/25/2022  9:00 AM Britta Steele, NP [facility] SHELLY HUANG   3/29/2022  5:00 PM Jaki Rose [facility]   3/31/2022  2:00 PM Gris Olvera, PT [facility]

## 2022-03-19 PROBLEM — R27.0 ATAXIA: Status: ACTIVE | Noted: 2019-03-11

## 2022-03-19 PROBLEM — G45.9 TIA (TRANSIENT ISCHEMIC ATTACK): Status: ACTIVE | Noted: 2019-03-11

## 2022-03-20 PROBLEM — G35 MULTIPLE SCLEROSIS (HCC): Status: ACTIVE | Noted: 2019-03-12

## 2022-03-22 ENCOUNTER — HOSPITAL ENCOUNTER (OUTPATIENT)
Dept: PHYSICAL THERAPY | Age: 37
Discharge: HOME | End: 2022-03-22
Payer: MEDICARE

## 2022-03-22 PROCEDURE — 97110 THERAPEUTIC EXERCISES: CPT

## 2022-03-22 PROCEDURE — 97112 NEUROMUSCULAR REEDUCATION: CPT

## 2022-03-22 NOTE — PROGRESS NOTES
PT DAILY TREATMENT NOTE     Patient Name: Marielena Butler  Date:3/22/2022  : 1985  [x]  Patient  Verified  Payor: Yovanny Tineo / Plan: [employer] / Product Type: Managed Care Medicare /    In time:5:00  Out time:5:43  Total Treatment Time (min): 43  Visit #: 5 of 8-16    Medicare/BCBS Only   Total Timed Codes (min):  43 1:1 Treatment Time:  39       Treatment Area: Neck pain [M54.2]  Gait instability [R26.81]  Multiple sclerosis [G35]    SUBJECTIVE  Pain Level (0-10 scale): 0  Any medication changes, allergies to medications, adverse drug reactions, diagnosis change, or new procedure performed?: [x] No    [] Yes (see summary sheet for update)  Subjective functional status/changes:   [x] No changes reported      OBJECTIVE      13 min Therapeutic Exercise:  [x] See flow sheet : manual stretching program for right LE into increased knee flexion, LE PNF D1 pattern, hamstring stretch, adductor stretch; each stretch performed slowly to avoid increased muscle tone with added focus on increased repetitions   Rationale: increase ROM and increase strength to improve the patients ability to perform gait and transfers with improved LE strength and mobility. 30 min Neuromuscular Re-education:  [x]  See flow sheet :    -TKE right knee 10 x 5\" hold, 2 sets, foam under left LE to shift weight to right LE  -Mini Squats 2x10, foam under left LE to shift weight to right LE  - Stand marches with left hip flexion progressing to left LE iveth step overs with tactile cues at right knee to promote controlled knee extension. Initiated with band behind right knee to promote TKE awareness   Strict avoidance of using UE for support to build trust in right LE strength/stability      Not today- Stride stance weight shifts anterior/posterior with continued tactile cues/block at right knee to prevent buckling.  Had patient use dowel isrrael to push into therapy resistance to promote improved weight shift to forward leg and the initiated without resistance   Rationale: increase strength, improve coordination, improve balance and increase proprioception  to improve the patients ability to perform stair negotiation and functional mobility in the home/community with improved quadriceps control and decreased falls risk. With   [] TE   [] TA   [] neuro   [] other: Patient Education: [x] Review HEP    [] Progressed/Changed HEP based on:   [] positioning   [] body mechanics   [] transfers   [] heat/ice application    [] other:      Other Objective/Functional Measures: -Held gait training today secondary to patient's reports of excessive tone/fatigue in right LE after NM Maryjane     Pain Level (0-10 scale) post treatment: 0    ASSESSMENT/Changes in Function: Patient demonstrates absent carryover at beginning of today's session in relation to increased left step length noted after previous therapy sessions. Continued focus on strengthening right LE and improving pt's ability to consciously shift weight to right LE with use of squats/TKE with left LE on foam. Pt better able to initiate left LE march without reliance on UE for support today. Patient will continue to benefit from skilled PT services to modify and progress therapeutic interventions, address functional mobility deficits, address ROM deficits, address strength deficits, analyze and address soft tissue restrictions, analyze and cue movement patterns, analyze and modify body mechanics/ergonomics, assess and modify postural abnormalities and address imbalance/dizziness to attain remaining goals. []  See Plan of Care  []  See progress note/recertification  []  See Discharge Summary         Progress towards goals / Updated goals: · Short Term Goals: To be accomplished in  2-4  weeks:  1. Patient will demonstrate compliance with HEP. -Goal met; pt notes establishing HEP (3/9/22)  2.  Patient will score greater than or equal to 8/28 on Tinetti to indicate improved balance/decreased fall risk. 3. Patient will maintain stance eyes open 30\" without UE support to increase safety with ADLs. -Goal met; 30\" EO without UE support, but with c/o dizziness following alleviated with seated rest break (3/9/22)  · Long Term Goals: To be accomplished in  4-8  weeks:  1. Patient will demonstrate independence with HEP. 2. Patient will score greater than or equal to 10/28 on Tinetti to indicate improved balance/decreased fall risk. Current: progressing, addressing with standing dynamic balance interventions (3/16/22)  3. Patient will demonstrate stance eyes closed 30\" without UE support to increase safety with ADLs. -Goal met; patient demos stance eyes closed 30 without UE support, but with c/o dizziness following requiring seated rest break to alleviate (3/9/22)  4. Patient will score greater than or equal to 56/100 on FOTO to indicate improved function.   (Progress Note due by 4/1/22)    PLAN  [x]  Upgrade activities as tolerated     [x]  Continue plan of care  []  Update interventions per flow sheet       []  Discharge due to:_  []  Other:_      Radha Deleon 3/22/2022  10:44 AM    Future Appointments   Date Time Provider Gabriella Benavidez   3/22/2022  5:00 PM Ulisses Anthony [facility]   3/24/2022 12:15 PM [facility] PT QUITAENT 2 [facility]   3/25/2022  9:00 AM Robert Steele, NP [facility] SHELLY SCHED   3/29/2022  5:00 PM Ulisses Anthony [facility]   3/31/2022  2:00 PM Elias Olvera, PT [facility]

## 2022-03-24 ENCOUNTER — HOSPITAL ENCOUNTER (OUTPATIENT)
Dept: PHYSICAL THERAPY | Age: 37
Discharge: HOME | End: 2022-03-24
Payer: MEDICARE

## 2022-03-24 PROCEDURE — 97112 NEUROMUSCULAR REEDUCATION: CPT

## 2022-03-24 PROCEDURE — 97110 THERAPEUTIC EXERCISES: CPT

## 2022-03-24 NOTE — PROGRESS NOTES
PT DAILY TREATMENT NOTE     Patient Name: Aneta Grey  Date:3/24/2022  : 1985  [x]  Patient  Verified  Payor: Humberto Allen / Plan: [employer] / Product Type: Managed Care Medicare /    In time: 6069  Out time:1305   Total Treatment Time (min): 46  Visit #: 6 of     Medicare/BCBS Only   Total Timed Codes (min):  46  1:1 Treatment Time:  46        Treatment Area: Neck pain [M54.2]  Gait instability [R26.81]  Multiple sclerosis [G35]    SUBJECTIVE  Pain Level (0-10 scale):3/10  Any medication changes, allergies to medications, adverse drug reactions, diagnosis change, or new procedure performed?: [x] No    [] Yes (see summary sheet for update)  Subjective functional status/changes:   [] No changes reported  Pt denied any adverse responses to last tx session. Reports that he brought his oral baclofen today to take in the event of inc pain or tone. Presents amb w/ a cane. OBJECTIVE      16 min Therapeutic Exercise:  [x] See flow sheet : manual stretching program for right LE into increased knee flexion, LE PNF D1/D2 pattern, hamstring stretch, adductor stretch; each stretch performed slowly to avoid increased muscle tone with added focus on increased repetitions   Rationale: increase ROM and increase strength to improve the patients ability to perform gait and transfers with improved LE strength and mobility. 30 min Neuromuscular Re-education:  [x]  See flow sheet :    - tap ups to 6 inch step  - step ups to 6 inch step  - iveth step overs fwd/side  - emphasis on weight shifts over the RLE during stance phase of movement patterns, tactile input provided to pt R knee to improve knee ext during SLS phase of activity. Pt w/ cues on placement, postural corrections.     Rationale: increase strength, improve coordination, improve balance and increase proprioception  to improve the patients ability to perform stair negotiation and functional mobility in the home/community with improved quadriceps control and decreased falls risk. With   [] TE   [] TA   [] neuro   [] other: Patient Education: [x] Review HEP , intent of activity  [] Progressed/Changed HEP based on:   [] positioning   [] body mechanics   [] transfers   [] heat/ice application    [] other:      Other Objective/Functional Measures:  + pt requested to work on stretching and activities in // bars today     Pain Level (0-10 scale) post treatment:   0/10    ASSESSMENT/Changes in Function:   Pt noted to have good carry over today re: PT verbal cues for weight shift and step length on the R, during session. Will assess at next visit re: carry over between sessions. Emphasis has been on improving mobility of the RLE in prep for standing/gait tasks, and emphasis on weight shifts over the RLE to improve safety of ambulation. Patient will continue to benefit from skilled PT services to modify and progress therapeutic interventions, address functional mobility deficits, address ROM deficits, address strength deficits, analyze and address soft tissue restrictions, analyze and cue movement patterns, analyze and modify body mechanics/ergonomics, assess and modify postural abnormalities and address imbalance/dizziness to attain remaining goals. []  See Plan of Care  []  See progress note/recertification  []  See Discharge Summary         Progress towards goals / Updated goals: · Short Term Goals: To be accomplished in  2-4  weeks:  1. Patient will demonstrate compliance with HEP. -Goal met; pt notes establishing HEP (3/9/22)  2. Patient will score greater than or equal to 8/28 on Tinetti to indicate improved balance/decreased fall risk. 3. Patient will maintain stance eyes open 30\" without UE support to increase safety with ADLs. -Goal met; 30\" EO without UE support, but with c/o dizziness following alleviated with seated rest break (3/9/22)  · Long Term Goals: To be accomplished in  4-8  weeks:  1.  Patient will demonstrate independence with HEP. 2. Patient will score greater than or equal to 10/28 on Tinetti to indicate improved balance/decreased fall risk. Current: progressing, addressing with standing dynamic balance interventions (3/16/22)  3. Patient will demonstrate stance eyes closed 30\" without UE support to increase safety with ADLs. -Goal met; patient demos stance eyes closed 30 without UE support, but with c/o dizziness following requiring seated rest break to alleviate (3/9/22)  4. Patient will score greater than or equal to 56/100 on FOTO to indicate improved function.   (Progress Note due by 4/1/22)    PLAN  [x]  Upgrade activities as tolerated     [x]  Continue plan of care  []  Update interventions per flow sheet       []  Discharge due to:_  []  Other:_      The Robert, PT 3/24/2022  1305    Future Appointments   Date Time Provider Gabriella Benavidez   3/24/2022 12:15 PM [facility]   3/25/2022  9:00 AM Robert Steele, NP [facility] SHELLY SCHED   3/29/2022  5:00 PM Ulisses Anthony [facility]   3/31/2022  2:00 PM Elias Olvera, PT [facility]

## 2022-03-29 ENCOUNTER — HOSPITAL ENCOUNTER (OUTPATIENT)
Dept: PHYSICAL THERAPY | Age: 37
Discharge: HOME | End: 2022-03-29
Payer: MEDICARE

## 2022-03-29 PROCEDURE — 97530 THERAPEUTIC ACTIVITIES: CPT

## 2022-03-29 PROCEDURE — 97112 NEUROMUSCULAR REEDUCATION: CPT

## 2022-03-29 PROCEDURE — 97116 GAIT TRAINING THERAPY: CPT

## 2022-03-29 PROCEDURE — 97110 THERAPEUTIC EXERCISES: CPT

## 2022-03-29 NOTE — PROGRESS NOTES
PT DAILY TREATMENT NOTE     Patient Name: Sangeeta Gomez  Date:3/29/2022  : 1985  [x]  Patient  Verified  Payor: Zoila Dave / Plan: [employer] / Product Type: Managed Care Medicare /    In time:5:05  Out time:6:06  Total Treatment Time (min): 61  Visit #: 7 of 8-16    Medicare/BCBS Only   Total Timed Codes (min):  61 1:1 Treatment Time:  61       Treatment Area: Neck pain [M54.2]  Gait instability [R26.81]  Multiple sclerosis [G35]    SUBJECTIVE  Pain Level (0-10 scale): 6  Any medication changes, allergies to medications, adverse drug reactions, diagnosis change, or new procedure performed?: [x] No    [] Yes (see summary sheet for update)  Subjective functional status/changes:   [] No changes reported  \"My leg is really hurting, it is very tight and sore today. \"    OBJECTIVE    11 min Therapeutic Exercise:  [x] See flow sheet : manual stretching program for right LE into increased knee flexion, LE PNF D1 pattern, hamstring stretch, adductor stretch, supine hip flexor stretch; each stretch performed slowly to avoid increased muscle tone with added focus on increased repetitions   Rationale: increase ROM and increase strength to improve the patients ability to perform gait and transfers with improved LE strength and mobility. 25 min Therapeutic Activity:  [x]  See flow sheet :   Rationale: increase strength, improve coordination, improve balance, and increase proprioception  to improve the patients ability to perform transfers, stair negotiation, and floor <> waist lifts. Patient education: Meryl Best, progress toward goals     10 min Neuromuscular Re-education:  [x]  See flow sheet : balance reassessment included on firm/foam surfaces for mCTSIB    - Stand marches with left hip flexion progressing to left LE iveth step overs with tactile cues at right knee to promote controlled knee extension.     Rationale: increase strength, improve coordination, improve balance and increase proprioception  to improve the patients ability to perform stair negotiation and functional mobility in the home/community with improved quadriceps control and decreased falls risk. 15 min Gait Trainin, 150 feet with walking stick over level surfaces with distant SBA. Cuing for increased left step length, energy conservation, visual scanning, upright posture, increased beatriz. 40 feet x 2 of following-  [] March            [] Lateral                   [] Horizontal HT  [] Tandem         [] Banded Lateral     [] Vertical HT  [] Retrograde    [] Banded Monster   [] Dual Task  [] Hurdles          [] Liliana Stain                    [] Ladder Drills  [] Heel Walk      [] Toe Walk   Rationale: improve safety with household/community ambulation. With   [] TE   [] TA   [] neuro   [] other: Patient Education: [x] Review HEP    [] Progressed/Changed HEP based on:   [] positioning   [] body mechanics   [] transfers   [] heat/ice application    [] other:      Other Objective/Functional Measures:   LE Strength:   Right (/5) Left (/5)   Hip     Flexion 4 5             Abduction 4 5             Adduction               Extension 3+ 4   Knee   Extension 5 5              Flexion 5 5     Gait: continues to demonstrate decreased left step length. Step to pattern with walking stick in right hand. Little reciprocity of gait observed with absent left heel strike even with extensive cuing to initiate. Wide base of support with LE externally rotated  2MWT - 280'  6MWT - 440' with 2 standing rest breaks  Balance:   mCTSIB  EO Firm 30\"  EC Firm 18\"  EO Foam 4\"  EC Foam Not attempted       Pain Level (0-10 scale) post treatment: 0    ASSESSMENT/Changes in Function: Patient has attended PT for 7 sessions for the treatment of impaired gait/mobility. The patient demonstrates small but significant indicators of progress at this time, likely due to prolonged history of MS diagnosis and progressive nature of this condition.  The pt demonstrates improving ability to confidently weight bear through right LE during marches and short bouts of gait; however, this improvement has not contributed to improved left step length at this time, leading to poor efficiency with gait and lack of true step through pattern. He endorses improvements in ambulation tolerance since [facility] although 6MWT continues to be limited by dizziness and fatigue. Increased right LE tone is a source of pain during the day. The patient will continue to benefit from skilled therapy to address continued restrictions impaired ambulation independence/endurance. Additional assessment includes:  Subjective Gains: decreased reliance on cane for walking, improved walking pattern, improved overall endurance, improved ambulation distances, use of rollator with chair  Subjective Deficits: increased muscle tone and spasms in right thigh (chiefly adductors/quadriceps), continued dizziness with exertion, using shower chair most of the time  % improvement: 50%  Pain   Average: 3-4/10, varies greatly throughout day based on fatigue/activity  Patient Goal: \"better with everything, make lower body strong\"        Patient will continue to benefit from skilled PT services to modify and progress therapeutic interventions, address functional mobility deficits, address ROM deficits, address strength deficits, analyze and address soft tissue restrictions, analyze and cue movement patterns, analyze and modify body mechanics/ergonomics, assess and modify postural abnormalities and address imbalance/dizziness to attain remaining goals. []  See Plan of Care  []  See progress note/recertification  []  See Discharge Summary         Progress towards goals / Updated goals: · Short Term Goals: To be accomplished in  2-4  weeks:  1. Patient will demonstrate compliance with HEP.   Current: -Goal met; pt notes establishing HEP (3/9/22)  2.  Patient will score greater than or equal to 8/28 on Tinetti to indicate improved balance/decreased fall risk. Current:   3. Patient will maintain stance eyes open 30\" without UE support to increase safety with ADLs. -Goal met; 30\" EO without UE support, but with c/o dizziness following alleviated with seated rest break (3/9/22)  · Long Term Goals: To be accomplished in  4-8  weeks:  1. Patient will demonstrate independence with HEP. 2. Patient will score greater than or equal to 10/28 on Tinetti to indicate improved balance/decreased fall risk. Current: progressing, addressing with standing dynamic balance interventions (3/16/22)  3. Patient will demonstrate stance eyes closed 30\" without UE support to increase safety with ADLs. -Goal met; patient demos stance eyes closed 30 without UE support, but with c/o dizziness following requiring seated rest break to alleviate (3/9/22)  4. Patient will score greater than or equal to 56/100 on FOTO to indicate improved function. Current: met, FOTO 62 pts (3/29/22)  (Progress Note due by 4/1/22)    PLAN  [x]  Upgrade activities as tolerated     [x]  Continue plan of care  []  Update interventions per flow sheet       []  Discharge due to:_  [x]  Other: PN due next session; please complete Tinetti and use assessment from today's session. Pt would benefit from continued PT.       Quang Berrios 3/29/2022  10:47 AM    Future Appointments   Date Time Provider Gabriella Benavidez   3/29/2022  5:00 PM Skip [facility]   3/31/2022  2:00 PM Krunal Salvador., PT [facility]   6/24/2022  9:00 AM Kiara Steele, NP [address] [facility]

## 2022-03-31 ENCOUNTER — HOSPITAL ENCOUNTER (OUTPATIENT)
Dept: PHYSICAL THERAPY | Age: 37
Discharge: HOME | End: 2022-03-31
Payer: MEDICARE

## 2022-03-31 PROCEDURE — 97112 NEUROMUSCULAR REEDUCATION: CPT

## 2022-03-31 PROCEDURE — 97110 THERAPEUTIC EXERCISES: CPT

## 2022-03-31 NOTE — PROGRESS NOTES
PT DAILY TREATMENT NOTE     Patient Name: Quincy Marie  Date:3/31/2022  : 1985  [x]  Patient  Verified  Payor: Ju Lorenzo / Plan: [employer] / Product Type: Managed Care Medicare /    In time:2:00  Out time:2:44  Total Treatment Time (min): 44  Visit #: 8 of     Medicare/BCBS Only   Total Timed Codes (min):  44 1:1 Treatment Time:  44       Treatment Area: Neck pain [M54.2]  Gait instability [R26.81]  Multiple sclerosis [G35]    SUBJECTIVE  Pain Level (0-10 scale): 0  Any medication changes, allergies to medications, adverse drug reactions, diagnosis change, or new procedure performed?: [x] No    [] Yes (see summary sheet for update)  Subjective functional status/changes:   [] No changes reported  \"No pain today, I am just having some muscle cramps in my right thigh like usual.\"    OBJECTIVE    18 min Therapeutic Exercise:  [x] See flow sheet : manual stretching program for right LE into increased knee flexion, LE PNF D1 pattern, hamstring stretch, adductor stretch, supine hip flexor stretch; each stretch performed slowly to avoid increased muscle tone with added focus on increased repetitions   Rationale: increase ROM and increase strength to improve the patients ability to perform gait and transfers with improved LE strength and mobility. 26 min Neuromuscular Re-education:  [x]  See flow sheet :  - Stand marches with left hip flexion progressing to left LE iveth step overs with tactile cues at right knee to promote controlled knee extension.   -Iveth step overs 10x left, 5x right outside of parallel bars     Rationale: increase strength, improve coordination, improve balance and increase proprioception  to improve the patients ability to perform stair negotiation and functional mobility in the home/community with improved quadriceps control and decreased falls risk.         With   [] TE   [x] TA   [] neuro   [] other: Patient Education: [x] Review HEP    [] Progressed/Changed HEP based on:   [] positioning   [] body mechanics   [] transfers   [] heat/ice application    [] other:      Other Objective/Functional Measures:   -TUG Time without AD- 32\"   -Tinetti - 10/28 pts     Pain Level (0-10 scale) post treatment: 0    ASSESSMENT/Changes in Function: Patient experienced near loss of balance when performing iveth step overs with right LE requiring modA x 1 from therapist to correct. He continues to be at increased risk for falls as assessed by TUG Test and Tinetti test. Will plan to continue with skilled therapy to address impaired gait/mobility. Patient will continue to benefit from skilled PT services to modify and progress therapeutic interventions, address functional mobility deficits, address ROM deficits, address strength deficits, analyze and address soft tissue restrictions, analyze and cue movement patterns, analyze and modify body mechanics/ergonomics, assess and modify postural abnormalities and address imbalance/dizziness to attain remaining goals. []  See Plan of Care  []  See progress note/recertification  []  See Discharge Summary         Progress towards goals / Updated goals: · Short Term Goals: To be accomplished in  2-4  weeks:  1. Patient will demonstrate compliance with HEP.   Current: -Goal met; pt notes establishing HEP (3/9/22)  2. Patient will score greater than or equal to 8/28 on Tinetti to indicate improved balance/decreased fall risk. Current: met, Tinetti 10/28 (3/31/22)   3. Patient will maintain stance eyes open 30\" without UE support to increase safety with ADLs. -Goal met; 30\" EO without UE support, but with c/o dizziness following alleviated with seated rest break (3/9/22)  · Long Term Goals: To be accomplished in  4-8  weeks:  1. Patient will demonstrate independence with HEP. 2. Patient will score greater than or equal to 10/28 on Tinetti to indicate improved balance/decreased fall risk.   Current: met, Tinetti 10/28 pts (3/31/22)  3. Patient will demonstrate stance eyes closed 30\" without UE support to increase safety with ADLs. -Goal met; patient demos stance eyes closed 30 without UE support, but with c/o dizziness following requiring seated rest break to alleviate (3/9/22)  4. Patient will score greater than or equal to 56/100 on FOTO to indicate improved function.   Current: met, FOTO 62 pts (3/29/22)  (Progress Note due by 4/1/22)    PLAN  [x]  Upgrade activities as tolerated     [x]  Continue plan of care  []  Update interventions per flow sheet       []  Discharge due to:_  []  Other:_      Liberty Morgan 3/31/2022  7:42 AM    Future Appointments   Date Time Provider Gabriella Benavidez   3/31/2022  2:00 PM Mathew [facility]   4/4/2022  5:45 PM Eluterio Nails, PTA [facility]   4/8/2022  2:15 PM [facility] [place] [facility]   4/12/2022 11:30 AM [facility] [place] [facility]   4/14/2022 11:30 AM [facility] [place] [facility]   4/21/2022  1:15 PM Zara De La Torre, PT [facility]   4/22/2022 12:45 PM Karsten Nunes [facility]   4/26/2022  1:00 PM [facility] PT [place] [facility]   4/28/2022 12:15 PM Jackson Cameron, PT [facility]   6/24/2022  9:00 AM Nina Steele, NP [address] [facility]

## 2022-04-01 NOTE — PROGRESS NOTES
[facility] AT [address]  Phone: [phone] Fax: [phone]  PROGRESS NOTE  Patient Name: Camille Lagunas : 1985   Treatment/Medical Diagnosis: Neck pain [M54.2]  Gait instability [R26.81]  Multiple sclerosis [G35]   Referral Source: Blanquita Vicente NP     Date of Initial Visit: 3/1/22 Attended Visits: 8 Missed Visits: 0     SUMMARY OF TREATMENT  Patient is a 43 y.o. male who presents with complaints of neck pain, back pain, right LE pain, dizziness/vertigo, imbalance and difficulty with functional mobility/gait. Treatment has consisted of: therapeutic exercise to improve LE/lumbar strength/mobility; therapeutic activities to improve transfer/lift/carry ability; neuromuscular re-education to improve balance, core stability, neuromuscular control with lifting; physical agent/modality for symptom management; manual therapy for symptom relief and muscle flexibility; patient education to improve symptom management; self Care training; home safety training; stair training, and functional mobility training. CURRENT STATUS  Patient has attended PT for 8 sessions for the treatment of impaired gait/mobility. The patient demonstrates small but significant indicators of progress at this time, likely due to prolonged history of MS diagnosis and progressive nature of this condition. The pt demonstrates improving ability to confidently weight bear through right LE during marches and short bouts of gait; however, this improvement has not contributed to improved left step length at this time, leading to poor efficiency with gait and lack of true step through pattern. He endorses improvements in ambulation tolerance since [facility] although 6MWT continues to be limited by dizziness and fatigue. Increased right LE tone is a source of pain during the day.  The patient will continue to benefit from skilled therapy to address continued restrictions impaired ambulation independence/endurance. Additional assessment includes:  Subjective Gains: decreased reliance on cane for walking, improved walking pattern, improved overall endurance, improved ambulation distances, use of rollator with chair  Subjective Deficits: increased muscle tone and spasms in right thigh (chiefly adductors/quadriceps), continued dizziness with exertion, using shower chair most of the time  % improvement: 50%  Pain   Average: 3-4/10, varies greatly throughout day based on fatigue/activity  Patient Goal: \"better with everything, make lower body strong\"  Objective Measures:   LE Strength:    Right (/5) Left (/5)   Hip     Flexion 4 5             Abduction 4 5             Adduction                 Extension 3+ 4   Knee   Extension 5 5              Flexion 5 5      Gait: continues to demonstrate decreased left step length. Step to pattern with walking stick in right hand. Little reciprocity of gait observed with absent left heel strike even with extensive cuing to initiate. Wide base of support with LE externally rotated  2MWT - 280'  6MWT - 440' with 2 standing rest breaks  Balance:   mCTSIB  EO Firm 30\"  EC Firm 18\"  EO Foam 4\"  EC Foam Not attempted    -TUG Time without AD- 32\"    -Tinetti - 10/28 pts       Current Goals: · Short Term Goals: To be accomplished in  2-4  weeks:  1. Patient will demonstrate compliance with HEP.   Current: -Goal met; pt notes establishing HEP (3/9/22)  2. Patient will score greater than or equal to 8/28 on Tinetti to indicate improved balance/decreased fall risk. Current: met, Tinetti 10/28 (3/31/22)   3. Patient will maintain stance eyes open 30\" without UE support to increase safety with ADLs. -Goal met; 30\" EO without UE support, but with c/o dizziness following alleviated with seated rest break (3/9/22)  · Long Term Goals: To be accomplished in  4-8  weeks:  1. Patient will demonstrate independence with HEP.   2. Patient will score greater than or equal to 10/28 on Tinetti to indicate improved balance/decreased fall risk. Current: met, Tinetti 10/28 pts (3/31/22)  3. Patient will demonstrate stance eyes closed 30\" without UE support to increase safety with ADLs. -Goal met; patient demos stance eyes closed 30 without UE support, but with c/o dizziness following requiring seated rest break to alleviate (3/9/22)  4. Patient will score greater than or equal to 56/100 on FOTO to indicate improved function. Current: met, FOTO 62 pts (3/29/22)        New Goals to be achieved in __10__  treatments:  1. Patient will demonstrate independence with updated HEP. PN: requires future progression  2. Patient will score greater than or equal to 16/28 on Tinetti to indicate improved balance/decreased fall risk. PN: Tinetti 10/28 pts  3. Patient will demonstrate stance eyes closed 30\" without UE support to increase safety with ADLs.    PN: 18\"  4. Patient will demo 6MWT of at least 500 feet with LRAD without dizziness to improve ease with community ambulation. Cristobal Frank PN: 440', significant dizziness due to fatigue  5. Patient will perform TUG test in 20 seconds or less without AD to improve safety with household mobility. Cristobal Frank PN: 32\"    RECOMMENDATIONS  Pt to continue with skilled therapy for 2x/week for 10 sessions to improve static/dynamic standing balance, increase independence with ambulation, decrease future burden for caregiver, and improve ease with household chores. If you have any questions/comments please contact us directly at [phone]   Thank you for allowing us to assist in the care of your patient. Therapist Signature: Veronica Hector Date: 4/1/2022   Reporting Period  3/1/22-3/31/22 Time: 10:26 AM   NOTE TO PHYSICIAN:  PLEASE COMPLETE THE ORDERS BELOW AND FAX TO   [facility] at [facility]: [phone].   If you are unable to process this request in 24 hours please contact our office: [phone]  ___ I have read the above report and request that my patient continue as recommended.   ___ I have read the above report and request that my patient continue therapy with the following changes/special instructions:_________________________________________________________   ___ I have read the above report and request that my patient be discharged from therapy.      Physician Signature:        Date:       Time:                                                        Mikki Nevarez NP.

## 2022-04-04 ENCOUNTER — HOSPITAL ENCOUNTER (OUTPATIENT)
Dept: PHYSICAL THERAPY | Age: 37
Discharge: HOME | End: 2022-04-04
Payer: MEDICARE

## 2022-04-04 PROCEDURE — 97110 THERAPEUTIC EXERCISES: CPT

## 2022-04-04 PROCEDURE — 97112 NEUROMUSCULAR REEDUCATION: CPT

## 2022-04-04 NOTE — PROGRESS NOTES
PT DAILY TREATMENT NOTE     Patient Name: Quincy Marie  Date:2022  : 1985  [x]  Patient  Verified  Payor: Ju Lorenzo / Plan: [employer] / Product Type: Managed Care Medicare /    In time: 5:45 pm         Out time: 6:37 pm  Total Treatment Time (min): 52  Visit #: 1 of 10    Medicare/BCBS Only   Total Timed Codes (min):  42 1:1 Treatment Time:  39       Treatment Area: Neck pain [M54.2]  Gait instability [R26.81]  Multiple sclerosis [G35]    SUBJECTIVE  Pain Level (0-10 scale): 0  Any medication changes, allergies to medications, adverse drug reactions, diagnosis change, or new procedure performed?: [x] No    [] Yes (see summary sheet for update)  Subjective functional status/changes:   [] No changes reported  \"I get dizzy so quickly. \"    OBJECTIVE  Ice, 10 minutes, to (R) anterior thigh with patient in supine to decrease mm spasm to improve the patient's ability to perform ADLs    14 min Therapeutic Exercise:  [x] See flow sheet: LE PNF D1 pattern, and supine march with therapist-assisted pressure through the tibial and femur to decrease spasticity   Rationale: increase ROM and increase strength to improve the patients ability to perform gait and transfers with improved LE strength and mobility. 28 min Neuromuscular Re-education:  [x]  See flow sheet:  - Stand marches with left hip flexion progressing to left LE iveth step overs with tactile cues at right knee to promote controlled knee extension.   -Iveth step overs 10x left, 5x right outside of parallel bars     Rationale: increase strength, improve coordination, improve balance and increase proprioception  to improve the patients ability to perform stair negotiation and functional mobility in the home/community with improved quadriceps control and decreased falls risk.         With   [] TE   [x] TA   [] neuro   [] other: Patient Education: [x] Review HEP     Other Objective/Functional Measures:       Pain Level (0-10 scale) post treatment: 0    ASSESSMENT/Changes in Function:  Patient with hamstring compensation during bridging, causing mm cramping, therefore limited to seven repetitions. Noted delayed motor planning with right LE placement for weight shifts and step ups, although patient does well with repetition/practice. Req sitting rest breaks intermittently during treatment to relieve episodes of dizziness which occur with each task. Patient will continue to benefit from skilled PT services to modify and progress therapeutic interventions, address functional mobility deficits, address ROM deficits, address strength deficits, analyze and address soft tissue restrictions, analyze and cue movement patterns, analyze and modify body mechanics/ergonomics, assess and modify postural abnormalities and address imbalance/dizziness to attain remaining goals. []  See Plan of Care  [x]  See progress note/recertification (5/97/01)  []  See Discharge Summary         Progress towards goals / Updated goals:  1. Patient will demonstrate independence with updated HEP. PN: requires future progression  2. Patient will score greater than or equal to 16/28 on Tinetti to indicate improved balance/decreased fall risk. PN: Tinetti 10/28 pts  3. Patient will demonstrate stance eyes closed 30\" without UE support to increase safety with ADLs.    PN: 18\"  4. Patient will demo 6MWT of at least 500 feet with LRAD without dizziness to improve ease with community ambulation. [place] PN: 440', significant dizziness due to fatigue  5. Patient will perform TUG test in 20 seconds or less without AD to improve safety with household mobility. [place]   PN: 32\"    PLAN  [x]  Upgrade activities as tolerated     [x]  Continue plan of care  []  Update interventions per flow sheet       []  Discharge due to:_  []  Other:_      Wilian Ambrose PTA 4/4/2022    Future Appointments   Date Time Provider Gabriella Benavidez   4/4/2022  5:45 PM Mazin Sanchez PTA [facility]   4/8/2022  2:15 PM [facility] [place] [facility]   4/12/2022 11:30 AM [facility] [place] [facility]   4/14/2022 11:30 AM [facility] [place] [facility]   4/21/2022  1:15 PM Shelli Watson, PT [facility]   4/22/2022 12:45 PM Jessica Cooper [facility]   4/26/2022  1:00 PM [facility] PT [place] [facility]   4/28/2022 12:15 PM Venecia Trujillo, PT [facility]   6/20/2022  9:40 AM Jonas Foster, NP [facility]

## 2022-04-07 ENCOUNTER — HOSPITAL ENCOUNTER (OUTPATIENT)
Dept: PHYSICAL THERAPY | Age: 37
Discharge: HOME | End: 2022-04-07
Payer: MEDICARE

## 2022-04-07 PROCEDURE — 97112 NEUROMUSCULAR REEDUCATION: CPT

## 2022-04-07 PROCEDURE — 97110 THERAPEUTIC EXERCISES: CPT

## 2022-04-07 NOTE — PROGRESS NOTES
PT DAILY TREATMENT NOTE     Patient Name: Huber Norris  Date:2022  : 1985  [x]  Patient  Verified  Payor: Mounika Hernandez / Plan: [employer] / Product Type: Managed Care Medicare /    In time: 6523       Out time: 1130  Total Treatment Time (min): 40   Visit #: 2 of 10    Medicare/BCBS Only   Total Timed Codes (min):  40 1:1 Treatment Time:  40        Treatment Area: Neck pain [M54.2]  Gait instability [R26.81]  Multiple sclerosis [G35]    SUBJECTIVE  Pain Level (0-10 scale): 0  Any medication changes, allergies to medications, adverse drug reactions, diagnosis change, or new procedure performed?: [x] No    [] Yes (see summary sheet for update)  Subjective functional status/changes:   [] No changes reported  Pt reports that he is doing well. \" I realized that if I take it easy at home on days of therapy, I have less increased tone and discomfort in the my R leg\". Pt reports the last took Baclofen yesterday. OBJECTIVE      15 min Therapeutic Exercise:  [x] See flow sheet: LE PNF D1/D2 pattern, hip IR/ER, supine hip flex stretch to tolerance, YOAN, all to the RLE. Rationale: increase ROM and increase strength to improve the patients ability to perform gait and transfers with improved LE strength and mobility. 25 min Neuromuscular Re-education:  [x]  See flow sheet     Rationale: increase strength, improve coordination, improve balance and increase proprioception  to improve the patients ability to perform stair negotiation and functional mobility in the home/community with improved quadriceps control and decreased falls risk.         With   [] TE   [x] TA   [] neuro   [] other: Patient Education: [x] Review HEP     Other Objective/Functional Measures:   + noted dizziness after supine>sit transition and therefore took [address] (pt keeps his meds in his pocket)  + added mediolateral weight shifts  + BP : 130/78 mmHg, L UE, brachial artery, pt seated    Pain Level (0-10 scale) post treatment: 0/10    ASSESSMENT/Changes in Function:  Pt limited this session 2/2 dizziness. Seated rest and standing rest, deep breathing performed to reduce dizziness. However, pt w/ difficulty performing tasks 2/2 dizziness, despite taking appropriate precautions and pt taking his medication. Pt denied any overheating or needing any cold packs. Pt requested to stop PT session 2/2 the dizziness. BP normal. Pt able to walk to lobby without any complications. No pain noted during/post tx. Patient will continue to benefit from skilled PT services to modify and progress therapeutic interventions, address functional mobility deficits, address ROM deficits, address strength deficits, analyze and address soft tissue restrictions, analyze and cue movement patterns, analyze and modify body mechanics/ergonomics, assess and modify postural abnormalities and address imbalance/dizziness to attain remaining goals. []  See Plan of Care  []  See progress note/recertification (5/24/52)  []  See Discharge Summary         Progress towards goals / Updated goals:  1. Patient will demonstrate independence with updated HEP. PN: requires future progression  2. Patient will score greater than or equal to 16/28 on Tinetti to indicate improved balance/decreased fall risk. PN: Tinetti 10/28 pts  3. Patient will demonstrate stance eyes closed 30\" without UE support to increase safety with ADLs.    PN: 18\"  4. Patient will demo 6MWT of at least 500 feet with LRAD without dizziness to improve ease with community ambulation. [place] PN: 440', significant dizziness due to fatigue  5. Patient will perform TUG test in 20 seconds or less without AD to improve safety with household mobility. [place]   PN: 32\"    PLAN  [x]  Upgrade activities as tolerated     [x]  Continue plan of care  []  Update interventions per flow sheet       []  Discharge due to:_  []  Other:_      [facility] 4/7/2022    Future Appointments   Date Time Provider Gabriella Benavidez   4/7/2022 10:45 AM [facility] PT [place] [facility]   4/12/2022 11:30 AM [facility] [place] [facility]   4/14/2022 11:30 AM [facility] [place] [facility]   4/21/2022  1:15 PM Megan Edwards, PT [facility]   4/22/2022 12:45 PM Raymond Perry [facility]   4/26/2022  1:00 PM [facility] PT [place] [facility]   4/28/2022 12:15 PM Abbey Roche, PT [facility]   6/20/2022  9:40 AM Michael Jackson, NP [address]

## 2022-04-08 ENCOUNTER — APPOINTMENT (OUTPATIENT)
Dept: PHYSICAL THERAPY | Age: 37
End: 2022-04-08
Payer: MEDICARE

## 2022-04-12 ENCOUNTER — HOSPITAL ENCOUNTER (OUTPATIENT)
Dept: PHYSICAL THERAPY | Age: 37
Discharge: HOME | End: 2022-04-12
Payer: MEDICARE

## 2022-04-12 PROCEDURE — 97112 NEUROMUSCULAR REEDUCATION: CPT | Performed by: PHYSICAL THERAPIST

## 2022-04-12 PROCEDURE — 97110 THERAPEUTIC EXERCISES: CPT | Performed by: PHYSICAL THERAPIST

## 2022-04-12 NOTE — PROGRESS NOTES
PT DAILY TREATMENT NOTE     Patient Name: Gavin Soriano  Date:2022  : 1985 [x]  Patient  Verified  Payor: Jeovany Giles / [facility] / Product Type: Managed Care Medicare /    In time: 1130am       Out time: 1225pm  Total Treatment Time (min): 55min   Visit #: 3 of 10    Medicare/BCBS Only   Total Timed Codes (min):  55 1:1 Treatment Time:  45        Treatment Area: Neck pain [M54.2]  Gait instability [R26.81]  Multiple sclerosis [G35]    SUBJECTIVE  Pain Level (0-10 scale): 0  Any medication changes, allergies to medications, adverse drug reactions, diagnosis change, or new procedure performed?: [x] No    [] Yes (see summary sheet for update)  Subjective functional status/changes:   [] No changes reported  Pt reports no dizziness when entering clinic. OBJECTIVE      30/25 min Therapeutic Exercise:  [x] See flow sheet: LE PNF D1/D2 pattern(TC), hip IR/ER, supine hip flex stretch to tolerance, YOAN, all to the R LE, pre gait drills, WS, step ups   Rationale: increase ROM and increase strength to improve the patients ability to perform gait and transfers with improved LE strength and mobility. 25/20 min Neuromuscular Re-education:  [x]  See flow sheet     Rationale: increase strength, improve coordination, improve balance and increase proprioception  to improve the patients ability to perform stair negotiation and functional mobility in the home/community with improved quadriceps control and decreased falls risk. With   [] TE   [x] TA   [] neuro   [] other: Patient Education: [x] Review HEP     Other Objective/Functional Measures:   Pt requires TC for WS with stride heel toe pre-gait activities. Frequent RB taken throughout session  Cue at hips for fwd WS with iveth step overs.  And with step ups on 4in step, reduced from 6in step up due to excessive use of UE and less LE use  SLR with min A from therapist initially  Added SAQ with 3# to help with TKE, as limited in standing therex of TKEs today    Pain Level (0-10 scale) post treatment: 0/10    ASSESSMENT/Changes in Function:  Patient continues to present with WBOS gait and decreased step length and use of walking stick in R hand. Mod cues throughout session for correct WS of hip over ankle with TKE. Pt continues to require frequent RB during treatment and reports dizziness when looking down for prolonged periods, so added mirror to treatment in II bars to allow more neutral c/s posture  Patient will continue to benefit from skilled PT services to modify and progress therapeutic interventions, address functional mobility deficits, address ROM deficits, address strength deficits, analyze and address soft tissue restrictions, analyze and cue movement patterns, analyze and modify body mechanics/ergonomeics, assess and modify postural abnormalities and address imbalance/dizziness to attain remaining goals. []  See Plan of Care  []  See progress note/recertification (4/63/37)  []  See Discharge Summary         Progress towards goals / Updated goals:  1. Patient will demonstrate independence with updated HEP. PN: requires future progression  2. Patient will score greater than or equal to 16/28 on Tinetti to indicate improved balance/decreased fall risk. PN: Tinetti 10/28 pts  3. Patient will demonstrate stance eyes closed 30\" without UE support to increase safety with ADLs.    PN: 18\"  4. Patient will demo 6MWT of at least 500 feet with LRAD without dizziness to improve ease with community ambulation. Marquis Amor PN: 440', significant dizziness due to fatigue  5. Patient will perform TUG test in 20 seconds or less without AD to improve safety with household mobility.   PN: 32\"    PLAN  [x]  Upgrade activities as tolerated     [x]  Continue plan of care  []  Update interventions per flow sheet       []  Discharge due to:_  []  Other:_      Sterling Cordero, PT 4/12/2022    Future Appointments   Date Time Provider Department [place]   4/12/2022 11:30 AM [facility] [place] [facility]   4/14/2022 11:30 AM [facility] [place] [facility]   4/21/2022  1:15 PM Eleno Javier, PT [facility]   4/22/2022 12:45 PM Latasha Hicks [facility]   4/26/2022  1:00 PM [facility] PT [place] [facility]   4/28/2022 12:15 PM Ritu Jernigan, PT [facility]   6/20/2022  9:40 AM Jefferson Bateman, NP [id number]

## 2022-04-14 ENCOUNTER — HOSPITAL ENCOUNTER (OUTPATIENT)
Dept: PHYSICAL THERAPY | Age: 37
Discharge: HOME | End: 2022-04-14
Payer: MEDICARE

## 2022-04-14 PROCEDURE — 97110 THERAPEUTIC EXERCISES: CPT | Performed by: PHYSICAL THERAPIST

## 2022-04-14 PROCEDURE — 97112 NEUROMUSCULAR REEDUCATION: CPT | Performed by: PHYSICAL THERAPIST

## 2022-04-14 NOTE — PROGRESS NOTES
PT DAILY TREATMENT NOTE     Patient Name: Jennifer Johnson  Date:2022  : 1985 [x]  Patient  Verified  Payor: Beatriz Lopez / Plan: [employer] / Product Type: Managed Care Medicare /    In time: 3266 am       Out time: 1220 pm  Total Treatment Time (min): 45min min   Visit #: 4 of 10    Medicare/BCBS Only   Total Timed Codes (min):  45min 1:1 Treatment Time: 40min        Treatment Area: Neck pain [M54.2]  Gait instability [R26.81]  Multiple sclerosis [G35]    SUBJECTIVE  Pain Level (0-10 scale): 0 tightness in R LE  Any medication changes, allergies to medications, adverse drug reactions, diagnosis change, or new procedure performed?: [x] No    [] Yes (see summary sheet for update)  Subjective functional status/changes:   [] No changes reported  Pt reports tightness in R LE after exercise. OBJECTIVE      20/15 min Therapeutic Exercise:  [x] See flow sheet: LE PNF D1/D2 pattern(TC), hip IR/ER, supine hip flex stretch to tolerance, YOAN, all to the R LE, pre gait drills, WS: NBOS, step ups/tap ups   Rationale: increase ROM and increase strength to improve the patients ability to perform gait and transfers with improved LE strength and mobility. 25 min Neuromuscular Re-education:  [x]  See flow sheet     Rationale: increase strength, improve coordination, improve balance and increase proprioception  to improve the patients ability to perform stair negotiation and functional mobility in the home/community with improved quadriceps control and decreased falls risk.         With   [] TE   [x] TA   [] neuro   [] other: Patient Education: [x] Review HEP     Other Objective/Functional Measures:   Step taps: 8in steps  Standing TKE: with BTB  Balance NBOS: with quad sets Craig , glut sets at edge of mat with no AD used    Pain Level (0-10 scale) post treatment: 0/10    ASSESSMENT/Changes in Function:  Patient with increased R LE tone/spasms today and deferred half of standing activities and mat activities. Pt took baclofen by mouth during session. Pt was able to perform TKE activities in standing today with BTB and cues for quad and glut activation in standing. Therapist performed TC with iveth step overs at hip (ant shift over knee with step through)  and TC at ant knee for improved confidence with SLS. Pt continues with Craig knee flexion gait and decreased step through on L. Patient will continue to benefit from skilled PT services to modify and progress therapeutic interventions, address functional mobility deficits, address ROM deficits, address strength deficits, analyze and address soft tissue restrictions, analyze and cue movement patterns, analyze and modify body mechanics/ergonomeics, assess and modify postural abnormalities and address imbalance/dizziness to attain remaining goals. []  See Plan of Care  []  See progress note/recertification (6/85/82)  []  See Discharge Summary         Progress towards goals / Updated goals:  1. Patient will demonstrate independence with updated HEP. PN: requires future progression  2. Patient will score greater than or equal to 16/28 on Tinetti to indicate improved balance/decreased fall risk. PN: Tinetti 10/28 pts  3. Patient will demonstrate stance eyes closed 30\" without UE support to increase safety with ADLs.    PN: 18\",   Current: normal ROSIE (feet hips width) with no AD or UE support today 30\" EO,   4. Patient will demo 6MWT of at least 500 feet with LRAD without dizziness to improve ease with community ambulation. Ericka Reyes PN: 440', significant dizziness due to fatigue  5. Patient will perform TUG test in 20 seconds or less without AD to improve safety with household mobility.   PN: 32\"    PLAN  [x]  Upgrade activities as tolerated     [x]  Continue plan of care  []  Update interventions per flow sheet       []  Discharge due to:_  []  Other:_      Babatunde Farah, PT 4/14/2022    Future Appointments   Date Time Provider Gabriella Benavidez 4/14/2022 11:30 AM [facility] [place] [facility]   4/21/2022  1:15 PM Krunal Davis, PT [facility]   4/22/2022 12:45 PM Manny Leon, PTA [facility]   4/26/2022  1:00 PM [facility] PT [place] 2 [facility]   4/28/2022 12:15 PM Moses Euceda, PT [facility]   6/20/2022  9:40 AM TERRELL Yousif

## 2022-04-21 ENCOUNTER — HOSPITAL ENCOUNTER (OUTPATIENT)
Dept: PHYSICAL THERAPY | Age: 37
Discharge: HOME | End: 2022-04-21
Payer: MEDICARE

## 2022-04-21 PROCEDURE — 97110 THERAPEUTIC EXERCISES: CPT

## 2022-04-21 PROCEDURE — 97112 NEUROMUSCULAR REEDUCATION: CPT

## 2022-04-21 NOTE — PROGRESS NOTES
PT DAILY TREATMENT NOTE     Patient Name: Patricio Vega  Date:2022  : 1985 [x]  Patient  Verified  Payor: Yasir Milling / [facility] [place] / Product Type: Managed Care Medicare /    In time: 116       Out time:215  Total Treatment Time (min): 59  Visit #: 5 of 10    Medicare/BCBS Only   Total Timed Codes (min):  59 1:1 Treatment Time: 59       Treatment Area: Neck pain [M54.2]  Gait instability [R26.81]  Multiple sclerosis [G35]    SUBJECTIVE  Pain Level (0-10 scale): 0 tightness in R LE  Any medication changes, allergies to medications, adverse drug reactions, diagnosis change, or new procedure performed?: [x] No    [] Yes (see summary sheet for update)  Subjective functional status/changes:   [] No changes reported  Pt reports no stiffness or tightness yet today but has his medication if needed. OBJECTIVE      20 min Therapeutic Exercise:  [x] See flow sheet: 6 min walk test , bed exercises    Rationale: increase ROM and increase strength to improve the patients ability to perform gait and transfers with improved LE strength and mobility. 39 min Neuromuscular Re-education:  [x]  See flow sheet: PNF patterns , step over with wt shifts      Rationale: increase strength, improve coordination, improve balance and increase proprioception  to improve the patients ability to perform stair negotiation and functional mobility in the home/community with improved quadriceps control and decreased falls risk. With rx  Patient Education: [x] Review HEP     Other Objective/Functional Measures:   6MWT : 564 ft with holding cane in hands but never utilizing it.      Hurdles with wt shifting - B UE needed to return form lunge position maury on R LE     HEP - alternating between home bike, \"APY\" exercise program, UE strengthening     Bridge - limited tolerance in knee flexion     Pain Level (0-10 scale) post treatment: 0/10    ASSESSMENT/Changes in Function:  Patient anamaria mg breaks during treatment today. VCing for encouragement during 6MWT to keep moving. Patient able to complete without sitting , only standing rest breaks. 4 rep max for SLR then one ecc assisted SLR . Sequencing with standing PNF patterns harder on the right requiring tactile cueing. Patient educated on 3 more visits scheduled then reassessment required to get more visits aut or DC to HEP - encourage patient think about benefits of continued PT vs DC to home program as patient feels he is self motivated. .       Patient will continue to benefit from skilled PT services to modify and progress therapeutic interventions, address functional mobility deficits, address ROM deficits, address strength deficits, analyze and address soft tissue restrictions, analyze and cue movement patterns, analyze and modify body mechanics/ergonomeics, assess and modify postural abnormalities and address imbalance/dizziness to attain remaining goals. []  See Plan of Care  [x]  See progress note/recertification (7/20/98)  []  See Discharge Summary         Progress towards goals / Updated goals: PN due 4/30  1. Patient will demonstrate independence with updated HEP. PN: requires future progression    2. Patient will score greater than or equal to 16/28 on Tinetti to indicate improved balance/decreased fall risk. PN: Tinetti 10/28 pts    3. Patient will demonstrate stance eyes closed 30\" without UE support to increase safety with ADLs.    PN: 18\"  Current: normal ROSIE (feet hips width) with no AD or UE support today 30\" EO    4. Patient will demo 6MWT of at least 500 feet with LRAD without dizziness to improve ease with community ambulation. Qing Shay PN: 440', significant dizziness due to fatigue  Current: 564 ft without AD , normalized gait deviations 4/21/2022    5. Patient will perform TUG test in 20 seconds or less without AD to improve safety with household mobility.   PN: 32\"    PLAN  [x]  Upgrade activities as tolerated     [x]  Continue plan of care  []  Update interventions per flow sheet       []  Discharge due to:_  [x]  Other:_ reassess in 3 visits      Briana Rios, PT 4/21/2022    Future Appointments   Date Time Provider Gabriella Benavidez   4/21/2022  1:15 PM Clary Melara, PT [facility]   4/22/2022 12:45 PM Candy Lo, PTA [facility]   4/26/2022  1:00 PM [facility] PT MANSI 2 [facility]   4/28/2022 12:15 PM Gerardo Cortez, PT [facility]   6/20/2022  9:40 AM Lindsay Gonzalez, NP [facility] [facility]

## 2022-04-22 ENCOUNTER — HOSPITAL ENCOUNTER (OUTPATIENT)
Dept: PHYSICAL THERAPY | Age: 37
Discharge: HOME | End: 2022-04-22
Payer: MEDICARE

## 2022-04-22 PROCEDURE — 97110 THERAPEUTIC EXERCISES: CPT

## 2022-04-22 PROCEDURE — 97112 NEUROMUSCULAR REEDUCATION: CPT

## 2022-04-22 NOTE — PROGRESS NOTES
PT DAILY TREATMENT NOTE     Patient Name: Coretta Nolen  Date:2022  : 1985 [x]  Patient  Verified  Payor: Morgandaniel Abbasiward / Plan: [employer] / Product Type: Managed Care Medicare /    In time: 12:45 pm        Out time: 1:32 pm  Total Treatment Time (min): 47  Visit #: 6 of 10    Medicare/BCBS Only   Total Timed Codes (min):  47 1:1 Treatment Time:  45       Treatment Area: Neck pain [M54.2]  Gait instability [R26.81]  Multiple sclerosis [G35]     SUBJECTIVE  Pain Level (0-10 scale): 0 tightness in R LE  Any medication changes, allergies to medications, adverse drug reactions, diagnosis change, or new procedure performed?: [x] No    [] Yes (see summary sheet for update)  Subjective functional status/changes:   [] No changes reported  \"I am just feeling stiff. I still get dizzy occasionally throughout the day, but no falls. \"    OBJECTIVE  23 min Therapeutic Exercise:  [x] See flow sheet:    Rationale: increase ROM and increase strength to improve the patients ability to perform gait and transfers with improved LE strength and mobility. 24 min Neuromuscular Re-education:  [x]  See flow sheet: weight shifting onto weight scale to ensure proper weight shift (as opposed to UE assistance) - patient able to maintain up to 150#; cone turning in tonio pattern     Rationale: increase strength, improve coordination, improve balance and increase proprioception  to improve the patients ability to perform stair negotiation and functional mobility in the home/community with improved quadriceps control and decreased falls risk. with rx  Patient Education: [x] Review HEP     Other Objective/Functional Measures:     Pain Level (0-10 scale) post treatment: 0    ASSESSMENT/Changes in Function:  Patient continues to demonstrate shorter stride length and poor heel-toe patterning with gait, although demonstrates safety when walking with a slower beatriz.  Most challenged with turning using walking stick, requiring multiple steps to perform turn in both directions. Patient will continue to benefit from skilled PT services to modify and progress therapeutic interventions, address functional mobility deficits, address ROM deficits, address strength deficits, analyze and address soft tissue restrictions, analyze and cue movement patterns, analyze and modify body mechanics/ergonomeics, assess and modify postural abnormalities and address imbalance/dizziness to attain remaining goals. []  See Plan of Care  [x]  See progress note/recertification (9/77/26)  []  See Discharge Summary         Progress towards goals / Updated goals: PN due 4/30  1. Patient will demonstrate independence with updated HEP. PN: requires future progression    2. Patient will score greater than or equal to 16/28 on Tinetti to indicate improved balance/decreased fall risk. PN: Tinetti 10/28 pts    3. Patient will demonstrate stance eyes closed 30\" without UE support to increase safety with ADLs.    PN: 18\"  Current: normal ROSIE (feet hips width) with no AD or UE support today 30\" EO    4. Patient will demo 6MWT of at least 500 feet with LRAD without dizziness to improve ease with community ambulation. PN: 440', significant dizziness due to fatigue  Current: 564 ft without AD , normalized gait deviations 4/21/2022    5. Patient will perform TUG test in 20 seconds or less without AD to improve safety with household mobility.   PN: 32\"   Current: goal progressing; initiate turning using cones today to progress to goals (4/22/22)    PLAN  [x]  Upgrade activities as tolerated     [x]  Continue plan of care  []  Update interventions per flow sheet       []  Discharge due to:_  [x]  Other: reassess in 2 visits    Mariluz Alicia PTA 4/22/2022    Future Appointments   Date Time Provider Gabriella Benavidez   4/26/2022  1:00 PM [facility]   4/28/2022 12:15 PM Bonita Whitehead PT [facility]   6/20/2022  9:40 AM TERRELL Montgomery

## 2022-04-26 ENCOUNTER — HOSPITAL ENCOUNTER (OUTPATIENT)
Dept: PHYSICAL THERAPY | Age: 37
Discharge: HOME | End: 2022-04-26
Payer: MEDICARE

## 2022-04-26 PROCEDURE — 97112 NEUROMUSCULAR REEDUCATION: CPT

## 2022-04-26 PROCEDURE — 97110 THERAPEUTIC EXERCISES: CPT

## 2022-04-26 NOTE — PROGRESS NOTES
PT DAILY TREATMENT NOTE     Patient Name: Quincy Marie  Date:2022  : 1985 [x]  Patient  Verified  Payor: Randychapitoyung Lorenzo / [facility]: [facility] [place] / Product Type: Managed Care Medicare /    In time: 9173   Out time: 55  Total Treatment Time (min): 57  Visit #: 7 of 10    Medicare/BCBS Only   Total Timed Codes (min):  57  1:1 Treatment Time:  57        Treatment Area: Neck pain [M54.2]  Gait instability [R26.81]  Multiple sclerosis [G35]     SUBJECTIVE  Pain Level (0-10 scale):\"just stiff\"  Any medication changes, allergies to medications, adverse drug reactions, diagnosis change, or new procedure performed?: [x] No    [] Yes (see summary sheet for update)  Subjective functional status/changes:   [] No changes reported  Pt denies any falls or any adverse responses to the last session. Notes \" I am really stiff today\". Pt requested to stretch prior to session today. Pt reports that he has been thinking of therapy and states \" I would like more therapy to improve in my walking more\". Last took medication for mm spasms \"early morning\", for dizziness, last took medication yesterday. OBJECTIVE  17 min Therapeutic Exercise:  [x] See flow sheet: assess while on nu-step, standing hip flex/knee flex at steps, supine PNF pattern AAROM of BLE w/ tactile pressure through LEs to reduce potential for mm spams , in prep for functional mobility. Rationale: increase ROM and increase strength to improve the patients ability to perform gait and transfers with improved LE strength and mobility.     40 min Neuromuscular Re-education:  [x]  See flow sheet: initiated quadruped activity: weight shifts anterioposterior into child's pose, crawling fwd/back for reciprocating movement of UE/LE in a weight bearing position, quadruped to tall kneel, tall kneel holds w/ pertubations all directions (no UE assist), tall kneel to 1/2 kneel position (unable to perform therefore did partial simulated pull throughs on either LE to facilitate hip flex strength, weight shifts, hip ROM, hands supported on stable object)  -reciprocating LE taps to 8 inch step  -steps x 2 laps, bilateral rails  -bridge: hip mobility/core strength/stabilization   Rationale: increase strength, improve coordination, improve balance and increase proprioception  to improve the patients ability to perform stair negotiation and functional mobility in the home/community with improved quadriceps control and decreased falls risk. with rx  Patient Education: [x] Review HEP, intent of weight bearing ROM activity in quadruped for reciprocating movement patterns (simular to walking), reduce mm spasm, promoting hip ROM. Pt verbalized understanding and denied any concerns for PT post tx. Other Objective/Functional Measures:   + increased to L2 on the nu-step for greater feedback into BLE for ROM   + added standing hip/knee flex stretch on 2nd step for ROM in weight bearing  + pt took mm relaxer and dizziness medication 15 min into session  + pt has not had to use cooling vest at this time despite 80-90 d weather outside and deferred need for ice during session today, intermittent rests taken as needed to prevent excess fatigue/heat  + initiated quadruped activity    Pain Level (0-10 scale) post treatment:0/10 \"I feel less stiff\"    ASSESSMENT/Changes in Function:  Pt noted to have 2 spasms in the RLE during assisted PNF stretching, despite manual pressure through the LE to reduce potential for spasm. Initiated quadruped activity as noted above to address ROM in the weight bearing position, reducing potential for muscle spasms. Pt w/ difficulty w/ coordinated movement patterns in the quadruped position during fwd/backward crawling w/ the RLE. Demo good ability to transition from sit>s/l>quadruped and back. Required hand on rail for rising into tall kneel position.  Once in tall kneeling, able to maintain position w/out external assist, however pt reported decreased confidence in ability to hold this position 2/2 weakness in the hips. Pt was challenged w/ attempt to perform tall to 1/2 kneel in prep for further hip ROM/floor transfer training, and w/ tall kneel perturbations all planes. Ultimately pt was unable to perform tall to 1/2 kneel, only demo ability to do partial pull through of either LE. Intermittent v.c. needed throughout session for pt to breathe w/ more difficult tasks as pt noted to have intermittent dizziness, consistent w/ breathhold during activity that required more effort. Pt denied any pain or discomfort post tx. Patient will continue to benefit from skilled PT services to modify and progress therapeutic interventions, address functional mobility deficits, address ROM deficits, address strength deficits, analyze and address soft tissue restrictions, analyze and cue movement patterns, analyze and modify body mechanics/ergonomeics, assess and modify postural abnormalities and address imbalance/dizziness to attain remaining goals. []  See Plan of Care  [x]  See progress note/recertification (2/32/09)  []  See Discharge Summary         Progress towards goals / Updated goals: PN due 4/30  1. Patient will demonstrate independence with updated HEP. PN: requires future progression  Current: 4/26/22: encouraged quadruped activity on days w/ inc dizziness or inc mm spasms of the LE to allow for safe movement    2. Patient will score greater than or equal to 16/28 on Tinetti to indicate improved balance/decreased fall risk. PN: Tinetti 10/28 pts    3. Patient will demonstrate stance eyes closed 30\" without UE support to increase safety with ADLs.    PN: 18\"  Current: normal ROSIE (feet hips width) with no AD or UE support today 30\" EO    4. Patient will demo 6MWT of at least 500 feet with LRAD without dizziness to improve ease with community ambulation.    PN: 440', significant dizziness due to fatigue  Current: 564 ft without AD , normalized gait deviations 4/21/2022    5. Patient will perform TUG test in 20 seconds or less without AD to improve safety with household mobility.   PN: 32\"   Current: goal progressing; initiate turning using cones today to progress to goals (4/22/22)    PLAN  [x]  Upgrade activities as tolerated     [x]  Continue plan of care  []  Update interventions per flow sheet       []  Discharge due to:_  [x]  Other: reassess NV    [facility] Robert PT 4/26/2022    Future Appointments   Date Time Provider Gabriella Benavidez   4/26/2022  1:00 PM [facility] PT E[place] 2 [facility]   4/28/2022 12:15 PM Mechelle Lancaster, PT [facility]   6/20/2022  9:40 AM Nga Spencer, NP [facility]

## 2022-04-28 ENCOUNTER — HOSPITAL ENCOUNTER (OUTPATIENT)
Dept: PHYSICAL THERAPY | Age: 37
Discharge: HOME | End: 2022-04-28
Payer: MEDICARE

## 2022-04-28 PROCEDURE — 97110 THERAPEUTIC EXERCISES: CPT

## 2022-04-28 PROCEDURE — 97530 THERAPEUTIC ACTIVITIES: CPT

## 2022-04-28 PROCEDURE — 97116 GAIT TRAINING THERAPY: CPT

## 2022-04-28 PROCEDURE — 97112 NEUROMUSCULAR REEDUCATION: CPT

## 2022-04-28 NOTE — PROGRESS NOTES
PT DAILY TREATMENT NOTE 8-14    Patient Name: Perry Mcadams  Date:2022  : 1985  [x]  Patient  Verified  Payor: Alexsandra Sanchez / Plan: [place] / Product Type: Managed Care Medicare /    In time:12:25  Out time:1:24   Total Treatment Time (min): 59  Visit #: 8 of 10     Medicare/BCBS Only   Total Timed Codes (min): 54 1:1 Treatment Time:   54      Treatment Area: Neck pain [M54.2]  Gait instability [R26.81]  Multiple sclerosis [G35]    SUBJECTIVE  Pain Level (0-10 scale): 0  Any medication changes, allergies to medications, adverse drug reactions, diagnosis change, or new procedure performed?: [x] No    [] Yes (see summary sheet for update)  Subjective functional status/changes:   [] No changes reported  See below for subjective. Notes today that he's having mm spasms before he came in. Pt was 7 min late for treatment and then went to the restroom at start of treatment. OBJECTIVE    10 min Therapeutic Exercise:  [] See flow sheet :  NuStep up to Level 2.5  Reassessment started while on NuStep and con't throughout session with rest breaks as needed     Rationale: increase ROM and increase strength to improve the patients ability to stand, walk, stairs, ADLs       16 min Therapeutic Activity:  [x]  See flow sheet :  Rest breaks taken throughout session to avoid fatigue and overheating; Intermittent dizziness noted and pt was positioned to avoid safety risk from falls. Rationale: for t/f, safety, improve ease of stairs, squatting, lunging.         18 min Neuromuscular Re-education:  [x]  See flow sheet :    Perturbations in standing  Perturbations (quick and sustained) in QP position  Tall knee with perturbations to trunk and pelvis - attempted lifts in this position but pt notes new onset of dizziness      Rationale: improve coordination, improve balance and increase proprioception  to improve the patients ability to improve safety in home and community     10 min Gait Trainin' with SPC over level surafaces with cues for step through on the L. SbAx1. Practiced in the parallel bars as well for proper step through patterning and weight shift    Rationale: improve safety with household/community ambulation. x min Patient Education: [x] Review HEP    [] Progressed/Changed HEP based on:   Step through patterning on the L   Discussed possible t/f to Post rehab exercise program (PRE) if pt not approved for more sessions via insurance. Reviewed pt's current progress vs needs for improvement  Pt would like more sessions of PT if approved     [] positioning   [] body mechanics   [] transfers   [] heat/ice application        Other Objective/Functional Measures:     Subjective Gains: decreased reliance on cane for walking, improved walking pattern, improved overall endurance, improved ambulation distances, use of rollator with chair for very long distances; balance and coordination (But still needs improvement)   Subjective Deficits: increased muscle tone and spasms in right thigh (chiefly adductors/quadriceps), dizziness provoked with certain activities (sitting down washing dishes; quick sit to stand t/f); using shower chair most of the time; balance/coordination not at goal level yet.   % improvement: 60%  Pain   Average: 0/10 ave; max 3/10 pain described as mm spasms and tightness in the LE's, intermittent needles/parestheias feeling   Patient Goal: \"better with everything, make lower body strong\"    Objective Measures:   LE Strength:    Right (/5) Left (/5)   Hip     Flexion 4 5             Abduction 4 5             Adduction  NT NT              Extension 3+ 4   Knee   Extension 4 5              Flexion 4 5      Gait: continues to demonstrate decreased left step length L > R.  Step-to pattern on the L with walking stick in right hand. Little reciprocity of gait observed with absent (B) heel strike even with extensive cuing to initiate.  Wide base of support with R > L LE externally rotated . Initiates gait with R Foot and step to with L LE. Unable to coordinate gait initiation with the L LE. Demo's ability to step through on the L (approx 1 foot length past the R) with extensive cuing and R UE Assist.   Stairs:  (B) UE assist, ER of the R LE; non-reciprocal. Ascends wit the R preferred (can ascending with the L but 2x difficulty noted; increased UE assist and momentum/vaulting  Bed mobility: rolling with min increased time; supine to sit: increased time required but I  Transitional positions: achieves QP position and can tolerate rhythmic stabs; achieves tall kneeling with UE assist on Thighs; 1/2 kneeling: unable to achieve position    Sit to stand t/f: able to perform without UE but prefers UE assist;  wide ROSIE    2MWT - NT (dizziness limited)   6MWT - 564', holding SPC in hands but not utilizing it; 2 rest breaks (standing)  Balance:   mCTSIB  EO Firm 30\" normal sway  EC Firm 30\" normal sway   EO Foam: NT today (time constraints)  EC Foam: NT today (time)     -TUG Time without AD- 22\" no   UE assist required for sit to stand;   30 second  6\" step test:  1 UE assist on railing (L); 13x   -Tinetti - 14/28 pts     Pain Level (0-10 scale) post treatment: 0    ASSESSMENT/Changes in Function: see PN     Patient will continue to benefit from skilled PT services to modify and progress therapeutic interventions, address functional mobility deficits, address ROM deficits, address strength deficits, analyze and address soft tissue restrictions, analyze and cue movement patterns, analyze and modify body mechanics/ergonomics, assess and modify postural abnormalities, address imbalance/dizziness and instruct in home and community integration to attain remaining goals. []  See Plan of Care  []  See progress note/recertification  []  See Discharge Summary         Progress towards goals / Updated goals: PN due 4/30  1. Patient will demonstrate independence with updated HEP.   PN: requires future progression  Current: 4/26/22: encouraged quadruped activity on days w/ inc dizziness or inc mm spasms of the LE to allow for safe movement     2. Patient will score greater than or equal to 16/28 on Tinetti to indicate improved balance/decreased fall risk. PN: Tinetti 10/28 pts  Current: goal progressing but not met; 14/28 (4/28/22)     3. Patient will demonstrate stance eyes closed 30\" without UE support to increase safety with ADLs.    PN: 18\"  Current: normal ROSIE (feet hips width) with no AD or UE support today 30\" EC (4/28/22)     4. Patient will demo 6MWT of at least 500 feet with LRAD without dizziness to improve ease with community ambulation. PN: 440', significant dizziness due to fatigue  Current: 564 ft without AD , normalized gait deviations 4/21/2022     5. Patient will perform TUG test in 20 seconds or less without AD to improve safety with household mobility.   PN: 32\"   Current: goal progressing at 22\" no AD; gait deviations as noted above (4/28/22)    PLAN  [x]  Upgrade activities as tolerated     [x]  Continue plan of care  []  Update interventions per flow sheet       []  Discharge due to:_  [x]  Other:_  Attempt to con't  PT if approved by insurance;  recommend 1x/week with increased HEP compliance    Leonard Mohr, PT 4/28/2022  10:31 AM    Future Appointments   Date Time Provider Gabriella Benavidez   4/28/2022 12:15 PM Jesus Manuel Go, PT [facility]   6/20/2022  9:40 AM Ernesto Cano, NP [address]

## 2022-05-12 ENCOUNTER — HOSPITAL ENCOUNTER (OUTPATIENT)
Dept: PHYSICAL THERAPY | Age: 37
Discharge: HOME | End: 2022-05-12
Payer: MEDICARE

## 2022-05-12 PROCEDURE — 97116 GAIT TRAINING THERAPY: CPT

## 2022-05-12 PROCEDURE — 97110 THERAPEUTIC EXERCISES: CPT

## 2022-05-12 NOTE — PROGRESS NOTES
PT DAILY TREATMENT NOTE     Patient Name: Fermin Méndez  Date:2022  : 1985  [x]  Patient  Verified  Payor: Rajwinder Callaway / Plan: [employer] / Product Type: Managed Care Medicare /    In time:1:19  Out time:2:03  Total Treatment Time (min): 44  Visit #: 1 of 4-8    Medicare/BCBS Only   Total Timed Codes (min):  44 1:1 Treatment Time:  44       Treatment Area: Neck pain [M54.2]  Gait instability [R26.81]  Multiple sclerosis [G35]    SUBJECTIVE  Pain Level (0-10 scale): 0  Any medication changes, allergies to medications, adverse drug reactions, diagnosis change, or new procedure performed?: [x] No    [] Yes (see summary sheet for update)  Subjective functional status/changes:   [] No changes reported  \"I am having more tightness in my right leg today. I am running low on my medicine. \"    OBJECTIVE    16 min Therapeutic Exercise:  [x] See flow sheet : right HS, calf, adductor stretching   Rationale: increase ROM and increase strength to improve the patients ability to perform gait and transfers with improved LE strength and mobility. 28 min Gait Trainin feet x 2 without AD over level surfaces with CGA. Cuing for increased left step length, increased reciprocity of gait, visual scanning, activity modification. Tactile cues at B hips for hip flexor activation  Ladder Drill Gait Training  Each performed 2 laps through the ladder without AD  [x] FWD March (2 Foot/Square)  [x] FWD March (1 Foot/Square)  [x] LAT March (1 Foot/Square) [] LAT March (Skip A Square)   Rationale: improve safety with household/community ambulation.             With   [x] TE   [] TA   [] neuro   [] other: Patient Education: [x] Review HEP    [] Progressed/Changed HEP based on:   [] positioning   [] body mechanics   [] transfers   [] heat/ice application    [] other:      Other Objective/Functional Measures: -provided pt with updated HEP for right LE stretching in primarily weight bearing positions for tone management     Pain Level (0-10 scale) post treatment: 0    ASSESSMENT/Changes in Function: Patient continues to demonstrate inconsistent carryover in left LE step length despite frequent verbal cues throughout session and ladder drill training designed to encourage left step length. Pt demonstrating some difficulty with right LE \"freezing\" today. Gait training performed without AD with close CGA to encourage improved dynamic balance. Patient will continue to benefit from skilled PT services to modify and progress therapeutic interventions, address functional mobility deficits, address ROM deficits, address strength deficits, analyze and address soft tissue restrictions, analyze and cue movement patterns, analyze and modify body mechanics/ergonomics, assess and modify postural abnormalities and address imbalance/dizziness to attain remaining goals. []  See Plan of Care  []  See progress note/recertification  []  See Discharge Summary         Progress towards goals / Updated goals:  1. Patient will demonstrate independence with updated HEP. PN: encouraged quadruped activity on days w/ inc dizziness or inc mm spasms of the LE to allow for safe movement     2. Patient will score greater than or equal to 16/28 on Tinetti to indicate improved balance/decreased fall risk. PN: Tinetti 14/28      3. Patient will demo 6MWT of at least 500 feet with LRAD without dizziness to improve ease with community ambulation.   PN:  564 ft without AD , gait deviations 4/21/2022  4. Patient will perform TUG test in 20 seconds or less without AD to improve safety with household mobility.   PN:  22\" no AD; gait deviations as noted above  Current: progressing, addressing with ladder drill training    PLAN  [x]  Upgrade activities as tolerated     [x]  Continue plan of care  []  Update interventions per flow sheet       []  Discharge due to:_  []  Other:_      Kendra Glover 5/12/2022  7:45 AM    Future Appointments   Date Time Provider Department Center   5/12/2022  1:15 PM Taos Bran [facility]   6/7/2022 12:15 PM Asim Escobar PT [facility]   6/13/2022 11:30 AM Junior Bran [facility]   6/20/2022  9:40 AM Dhruv Steele, [facility]   6/24/2022  1:30 PM Taos Bran [facility]   6/29/2022 11:30 AM Junior Bran [facility]

## 2022-05-26 ENCOUNTER — HOSPITAL ENCOUNTER (OUTPATIENT)
Dept: PHYSICAL THERAPY | Age: 37
Discharge: HOME | End: 2022-05-26
Payer: MEDICARE

## 2022-05-26 PROCEDURE — 97112 NEUROMUSCULAR REEDUCATION: CPT

## 2022-05-26 PROCEDURE — 97110 THERAPEUTIC EXERCISES: CPT

## 2022-05-26 PROCEDURE — 97116 GAIT TRAINING THERAPY: CPT

## 2022-05-26 NOTE — PROGRESS NOTES
PT DAILY TREATMENT NOTE     Patient Name: Lucy Lynch  Date:2022  : 1985  [x]  Patient  Verified  Payor: Michaelflora Reynolds / Plan: [address] [place] [address] / Product Type: Managed Care Medicare /    In time: 4:15 Out time:4:54  Total Treatment Time (min): 39'  Visit #: 2 of 4-8    Medicare/BCBS Only   Total Timed Codes (min): 39' 1:1 Treatment Time:  44'       Treatment Area: Neck pain [M54.2]  Gait instability [R26.81]  Multiple sclerosis [G35]    SUBJECTIVE  Pain Level (0-10 scale): 0  Any medication changes, allergies to medications, adverse drug reactions, diagnosis change, or new procedure performed?: [x] No    [] Yes (see summary sheet for update)  Subjective functional status/changes:   [] No changes reported  I want some exercises for R leg strength. Pt fatigues towards end of session, ending session early. OBJECTIVE    9 min Therapeutic Exercise:  [x] See flow sheet : right HS, calf, adductor stretching at start of treatment to modify tone    Rationale: increase ROM and increase strength to improve the patients ability to perform gait and transfers with improved LE strength and mobility. 15 min Neuromuscular re-education :  [x] See flow sheet :   1/2 kneeling position (B)  Tall kneeling lifts and chops with Yellow med ball   Tall kneeling with dynamic balance ball toss, no LOB. Added perturbations posterior and lateral to B hips. Rationale: increase ROM and increase strength to improve the patients ability to perform gait and transfers with improved LE strength and mobility. 15 min Gait Trainin feet x 2 without AD over level surfaces with CGA.    Ladder Drill Gait Training, requiring muscle tapping to R quad to initiate R LE advancement, SBA x2    [x] FWD March (2 Foot/Square)1 lap  [x] Step-to forward/retro 1/2 lap  [x] LAT March (1 Foot/Square) 1/2 lap [x] Step to diagonal across ladder 1/2 lap   Rationale: improve safety with household/community ambulation. With   [x] TE   [] TA   [] neuro   [] other: Patient Education: [x] Review HEP    [x] Progressed/Changed HEP based on:    -updated HEP for developmental positions for strength and core stab   Tall kneeling  Half kneeling  QP    [] positioning   [] body mechanics   [] transfers   [] heat/ice application    [] other:      Other Objective/Functional Measures:    Pain Level (0-10 scale) post treatment: 0    ASSESSMENT/Changes in Function:  Pt shows fair+/good balance during tall kneeling and 1/2 kneeling balance activities with ability to self correct during weight shift/postural changes with no LOB. Pt very fatigued during treatment and defers longer treatment session. Increased RPE to approx 8-9/10 during 1/2 kneeling balance tasks. Patient will continue to benefit from skilled PT services to increase static and dynamic balance during functional activities, increase strength in B LE for improved endurance during community ambulation. []  See Plan of Care  []  See progress note/recertification  []  See Discharge Summary         Progress towards goals / Updated goals:  1. Patient will demonstrate independence with updated HEP. PN: encouraged quadruped activity on days w/ inc dizziness or inc mm spasms of the LE to allow for safe movement  Current: Pt supplied with HEP, including 1/2 kneeling balance, tall kneeling with diagonal lift with weight ball, and bird dog 5/26/2022     2. Patient will score greater than or equal to 16/28 on Tinetti to indicate improved balance/decreased fall risk. PN: Tinetti 14/28   Current: Pt shows ability to complete 1/2 kneeling balance exercises with ability to self correct and no instance of LOB. 5/26/2022     3. Patient will demo 6MWT of at least 500 feet with LRAD without dizziness to improve ease with community ambulation.   PN:  564 ft without AD , gait deviations 4/21/2022  Current: Pt ambulates with out AD for approx.  13' with gait deviations 5/26/2022    4. Patient will perform TUG test in 20 seconds or less without AD to improve safety with household mobility.   PN:  22\" no AD; gait deviations as noted above  Current: progressing, addressing with ladder drill training 5/26/2022     PLAN  [x]  Upgrade activities as tolerated     [x]  Continue plan of care  []  Update interventions per flow sheet       []  Discharge due to:_  [x]  Other:_con't to progress program and stress HEP compliance for long-term carry over      Robby Brito, PT 5/26/2022  7:45 AM    Future Appointments   Date Time Provider Gabriella Benavidez   5/26/2022  4:15 PM Radha Kirk, PT [facility]   6/7/2022 12:15 PM Radha Kirk PT [facility]   6/13/2022 11:30 AM Neluz Osbornech [facility]   6/20/2022  9:40 AM Leann Steele, NP [facility] [address]   6/24/2022  1:30 PM Nelta Indiach [facility]   6/29/2022 11:30 AM Nea Indiach [facility]

## 2022-06-07 ENCOUNTER — HOSPITAL ENCOUNTER (OUTPATIENT)
Dept: PHYSICAL THERAPY | Age: 37
Discharge: HOME | End: 2022-06-07
Payer: MEDICARE

## 2022-06-07 PROCEDURE — 97112 NEUROMUSCULAR REEDUCATION: CPT

## 2022-06-07 PROCEDURE — 97116 GAIT TRAINING THERAPY: CPT

## 2022-06-07 NOTE — PROGRESS NOTES
[address] [facility] [address] [facility] AT [address]  Phone: [phone] Fax: [phone] OF CARE/RECERTIFICATION FOR PHYSICAL THERAPY          Patient Name: Luis Mccann : 1985   Treatment/Medical Diagnosis: Neck pain [M54.2]  Gait instability [R26.81]  Multiple sclerosis [G35]   Onset Date:     Referral Source: Jose Angel Barnes NP Start of Care [facility]): 3/1/2022   Prior Hospitalization: See Medical History Provider #: [id number]   Prior Level of Function: MS 2019, CVA 2019, prior PT; ambulation with walking stick or without AD   Comorbidities: CVA 2019, MS    Medications: Verified on Patient Summary List   Vis[facility] from [facility]: 18 Missed Visits: 0       Key Functional Changes/Progress: Pt making slower gains in therapy recently due to time between sessions and decreased full HEP compliance. Pt does demo improved ease of transitional movements, improved gait speed, less use of SPC and some increased LE strength. He would benefit from improved HEP compliance several times / week to improve neurological control, balance, safety and strength.       Subjective Gains: very intermittent use of cane/walking stick for walking, improved walking pattern, improved overall endurance, improved ambulation distances, con't use of rollator with chair for very long distances; balance and coordination (But still needs improvement); decreased mm spasms in right thigh (chiefly adductors/quadriceps)  Subjective Deficits: dizziness provoked with certain activities (sitting down washing dishes; quick sit to stand t/f); using shower chair most of the time; balance/coordination not at goal level yet.   % improvement: 65%  Pain   Average: 0/10 ave; max 3/10 pain described as mm spasms and tightness in the LE's, intermittent needles/parestheias feeling  Patient Goal: Continued \"better with everything, make lower body strong\"     Objective Measures:   LE Strength:    Right (/5) Left (/5)   Hip     Flexion 4+ 5             Abduction 4 5             Adduction  NT NT              Extension 3+ 4+   Knee   Extension 4+ 5              Flexion 4 5      Gait: continues to demonstrate decreased left step length.  Step-to pattern on the L with walking stick in right hand. Little reciprocity of gait observed with absent (B) heel strike even with extensive cuing to initiate. Wide base of support with R > L LE externally rotated . Initiates gait with R Foot and step to with L LE. Unable to coordinate gait initiation with the L LE.  Demo's ability to step through on the L (approx 1 foot length past the R) with extensive cuing and R UE Assist.   Stairs:  (B) UE assist, ER of the R LE; non-reciprocal. Ascends wit the R preferred (can ascending with the L but 2x difficulty noted; increased UE assist. Pt confident and safe with descending stairs with (B) UE assist.   Bed mobility: rolling with min increased time; supine to sit: increased time required but I  Transitional positions: achieves QP position and can tolerate rhythmic stabs; achieves tall kneeling Indendently; 1/2 kneeling: able to achieve intermittently (limited by hip tightness)   Sit to stand t/f: able to perform without UE but prefers UE assist; wide ROSIE; safe upon standing      2MWT - NT   6MWT - 211 feet (4.5 minutes) + 50 feet (1.5 min) on TM with 1 UE assist to mimic community gait; RPE noted at 3; at best pt performed 564', holding SPC in hands but not utilizing it; 2 rest breaks (standing)  Balance:   mCTSIB  EO Firm 30\" normal sway  EC Firm 30\" normal sway   EO Foam: 30\" increased sway  EC Foam: 10\"       -TUG Time without AD- 22\" no  UE assist required for sit to stand  30 second  6\" step test:  (B) UE assist on railing;  23x  -Tinetti - 14/28 pts  (tested at last PN)  30\" Sit to stand: 4x with UE       Problem List: pain affecting function, decrease ROM, decrease strength, impaired gait/ balance, decrease ADL/ functional abilitiies, decrease activity tolerance, decrease flexibility/ joint mobility and decrease transfer abilities   Treatment Plan may include any combination of the following: Therapeutic exercise, Therapeutic activities, Neuromuscular re-education, Physical agent/modality, Gait/balance training, Manual therapy, Patient education, Self Care training, Functional mobility training, Home safety training and Stair training  Patient Goal(s) has been updated and includes:       Goals for last period include:   1. Patient will demonstrate independence with updated HEP. PN: encouraged quadruped activity on days w/ inc dizziness or inc mm spasms of the LE to allow for safe movement  Current: Pt supplied with HEP, including 1/2 kneeling balance, tall kneeling with diagonal lift with weight ball, and bird dog (6/7/22)     2. Patient will score greater than or equal to 16/28 on Tinetti to indicate improved balance/decreased fall risk. PN: Tinetti 14/28   Current: Pt shows ability to complete 1/2 kneeling balance exercises with ability to self correct and no instance of LOB. 5/26/2022     3. Patient will demo 6MWT of at least 500 feet with LRAD without dizziness to improve ease with community ambulation.   PN:  564 ft without AD , gait deviations 4/21/2022  Current: pt with increased speed with gait; progressed to use of TM today for gym/HEP 6/7/22)     4. Patient will perform TUG test in 20 seconds or less without AD to improve safety with household mobility. PN:  22\" no AD; gait deviations as noted above  Current: maintains; 22\" with no AD; difficulty with turning (6/7/22)      Goals for this certification period include and are to be achieved in   4-12  treatments:   All from above    Frequency / Duration:   Patient to be seen   1   times per week for   4-12    treatments over 90 day certification period  Assessments/Recommendations: Pt recertification to include being see 1x/week for 4-12 sessions but anticipate pt will be D/C in 3 remaining scheduled session. Pt to progress to HEP and I with management of strength, balance, conditioning work. If you have any questions/comments please contact us directly at [phone]. Thank you for allowing us to assist in the care of your patient. Therapist Signature: Taryn De La Rosa DPT Date: 0/2/1311   Certification Period:  Reporting Period: 6/7/2022 to 9/5/2022 4/28/22 to 6/7/33 Time: 9:01 AM   NOTE TO PHYSICIAN:  PLEASE COMPLETE THE ORDERS BELOW AND FAX TO   [facility] [phone]  If you are unable to process this request in 24 hours please contact our office: [phone]    ___ I have read the above report and request that my patient continue as recommended.   ___ I have read the above report and request that my patient continue therapy with the following changes/special instructions: ________________________________________________   ___ I have read the above report and request that my patient be discharged from therapy.      Physician Signature:        Date:       Time:    Nithin Valencia NP

## 2022-06-07 NOTE — PROGRESS NOTES
PT DAILY TREATMENT NOTE     Patient Name: Camille Lagunas  Date:2022  : 1985  [x]  Patient  Verified  Payor: Marlee Rockwell / Plan: [address] [place] [address] / Product Type: Managed Care Medicare /    In time: 12:18 Out time:1:00  Total Treatment Time (min): 42  Visit #: 3 of 4-8    Medicare/BCBS Only   Total Timed Codes (min): 42 1:1 Treatment Time:  42       Treatment Area: Neck pain [M54.2]  Gait instability [R26.81]  Multiple sclerosis [G35]    SUBJECTIVE  Pain Level (0-10 scale): 0  Any medication changes, allergies to medications, adverse drug reactions, diagnosis change, or new procedure performed?: [x] No    [] Yes (see summary sheet for update)  Subjective functional status/changes:   [] No changes reported  Pt notes he's somewhat tired today. OBJECTIVE    0 min Therapeutic Exercise:  [x] See flow sheet : right HS, calf, adductor stretching at start of treatment to modify tone    Rationale: increase ROM and increase strength to improve the patients ability to perform gait and transfers with improved LE strength and mobility. 30 min Neuromuscular re-education :  [x] See flow sheet :   1/2 kneeling position (B) - unable to achieve today   Tall kneeling lifts and chops with Yellow med ball   Tall kneeling with dynamic balance ball toss, no LOB. Added perturbations posterior and lateral to B hips. Reassessment for PN/Recert     Rationale: increase ROM and increase strength to improve the patients ability to perform gait and transfers with improved LE strength and mobility. 12 min Gait Trainin min walk test on TM today with safety strap,  L UE assist and sbA x1 of PT. Pt only able to walk at 0.7mph. TUG testing        [] FWD March (2 Foot/Square)1 lap  [] Step-to forward/retro 1/2 lap  [] LAT March (1 Foot/Square) 1/2 lap [] Step to diagonal across ladder 1/2 lap   Rationale: improve safety with household/community ambulation.            With   [x] TE   [] TA   [] neuro   [] other: Patient Education: [x] Review HEP    [x] Progressed/Changed HEP based on:    -updated HEP for developmental positions for strength and core stab   Tall kneeling  Half kneeling  QP bird dogs UE and LE only  -Pt to con't for 3-4 sessions and then be D/C to HEP. Reviewed this information with Pt today and pt notes agreement  -advised that PTs are able to assist him with HEP management on the phone after D/C. -heat management       [] positioning   [] body mechanics   [] transfers   [] heat/ice application    [] other:      Other Objective/Functional Measures:    Subjective Gains: very intermittent use of cane/walking stick for walking, improved walking pattern, improved overall endurance, improved ambulation distances, con't use of rollator with chair for very long distances; balance and coordination (But still needs improvement); decreased mm spasms in right thigh (chiefly adductors/quadriceps)  Subjective Deficits: dizziness provoked with certain activities (sitting down washing dishes; quick sit to stand t/f); using shower chair most of the time; balance/coordination not at goal level yet.   % improvement: 65%  Pain   Average: 0/10 ave; max 3/10 pain described as mm spasms and tightness in the LE's, intermittent needles/parestheias feeling  Patient Goal: Continued \"better with everything, make lower body strong\"     Objective Measures:   LE Strength:    Right (/5) Left (/5)   Hip     Flexion 4+ 5             Abduction 4 5             Adduction  NT NT              Extension 3+ 4+   Knee   Extension 4+ 5              Flexion 4 5      Gait: continues to demonstrate decreased left step length.  Step-to pattern on the L with walking stick in right hand. Little reciprocity of gait observed with absent (B) heel strike even with extensive cuing to initiate.  Wide base of support with R > L LE externally rotated . Initiates gait with R Foot and step to with L LE. Unable to coordinate gait initiation with the L LE. Demo's ability to step through on the L (approx 1 foot length past the R) with extensive cuing and R UE Assist.   Stairs:  (B) UE assist, ER of the R LE; non-reciprocal. Ascends wit the R preferred (can ascending with the L but 2x difficulty noted; increased UE assist. Pt confident and safe with descending stairs with (B) UE assist.   Bed mobility: rolling with min increased time; supine to sit: increased time required but I  Transitional positions: achieves QP position and can tolerate rhythmic stabs; achieves tall kneeling Indendently; 1/2 kneeling: able to achieve intermittently (limited by hip tightness)   Sit to stand t/f: able to perform without UE but prefers UE assist; wide ROSIE; safe upon standing      2MWT - NT   6MWT - 211 feet (4.5 minutes) + 50 feet (1.5 min) on TM with 1 UE assist to mimic community gait; RPE noted at 3; at best pt performed 564', holding SPC in hands but not utilizing it; 2 rest breaks (standing)  Balance:   mCTSIB  EO Firm 30\" normal sway  EC Firm 30\" normal sway   EO Foam: 30\" increased sway  EC Foam: 10\"       -TUG Time without AD- 22\" no  UE assist required for sit to stand  30 second  6\" step test:  (B) UE assist on railing;  23x  -Tinetti - 14/28 pts  (tested at last PN)  30\" Sit to stand: 4x with UE     Pain Level (0-10 scale) post treatment: 0    ASSESSMENT/Changes in Function:  See PN/Recert       Patient will continue to benefit from skilled PT services to increase static and dynamic balance during functional activities, increase strength in B LE for improved endurance during community ambulation. []  See Plan of Care  [x]  See progress note/recertification  []  See Discharge Summary         Progress towards goals / Updated goals:  1. Patient will demonstrate independence with updated HEP.   PN: encouraged quadruped activity on days w/ inc dizziness or inc mm spasms of the LE to allow for safe movement  Current: Pt supplied with HEP, including 1/2 kneeling balance, tall kneeling with diagonal lift with weight ball, and bird dog (6/7/22)     2. Patient will score greater than or equal to 16/28 on Tinetti to indicate improved balance/decreased fall risk. PN: Tinetti 14/28   Current: Pt shows ability to complete 1/2 kneeling balance exercises with ability to self correct and no instance of LOB. 5/26/2022     3. Patient will demo 6MWT of at least 500 feet with LRAD without dizziness to improve ease with community ambulation.   PN:  564 ft without AD , gait deviations 4/21/2022  Current: pt with increased speed with gait; progressed to use of TM today for gym/HEP 6/7/22)    4. Patient will perform TUG test in 20 seconds or less without AD to improve safety with household mobility.   PN:  22\" no AD; gait deviations as noted above  Current: maintains; 22\" with no AD; difficulty with turning (6/7/22)     PLAN  [x]  Upgrade activities as tolerated     [x]  Continue plan of care  []  Update interventions per flow sheet       []  Discharge due to:_  [x]  Other:_con't with remaining scheduled sessions; please perform Tinetti assessment prior to D/C    Reema Bragg PT 6/7/2022  7:45 AM    Future Appointments   Date Time Provider Gabriella Benavidez   6/7/2022 12:15 PM Alissa Dempsey PT [facility]   6/13/2022 11:30 AM Bety Levo [facility]   6/20/2022  9:40 AM Julita Powers NP [id number]   6/24/2022  1:30 PM Bety Levo [facility]   6/29/2022 11:30 AM Bety Levo [facility]

## 2022-06-09 ENCOUNTER — APPOINTMENT (OUTPATIENT)
Dept: PHYSICAL THERAPY | Age: 37
End: 2022-06-09
Payer: MEDICARE

## 2022-06-13 ENCOUNTER — HOSPITAL ENCOUNTER (OUTPATIENT)
Dept: PHYSICAL THERAPY | Age: 37
Discharge: HOME | End: 2022-06-13
Payer: MEDICARE

## 2022-06-13 PROCEDURE — 97112 NEUROMUSCULAR REEDUCATION: CPT

## 2022-06-13 NOTE — PROGRESS NOTES
PT DAILY TREATMENT NOTE     Patient Name: Anjum Castillo  Date:2022  : 1985  [x]  Patient  Verified  Payor: Felipe Andersoneman / Plan: [employer] / Product Type: Managed Care Medicare /    In time:11:37  Out time:12:18  Total Treatment Time (min): 41  Visit #: 1 of 4    Medicare/BCBS Only   Total Timed Codes (min):  41 1:1 Treatment Time:  41       Treatment Area: Neck pain [M54.2]  Gait instability [R26.81]  Multiple sclerosis [G35]    SUBJECTIVE  Pain Level (0-10 scale): 0  Any medication changes, allergies to medications, adverse drug reactions, diagnosis change, or new procedure performed?: [x] No    [] Yes (see summary sheet for update)  Subjective functional status/changes:   [] No changes reported  \"I am feeling more tired and dizzy today than usual, I don't know how much I will be able to do. \"    OBJECTIVE     41 min Neuromuscular Re-education:  [x]  See flow sheet :  Quadruped UE lifts 10x B, LE lifts 10 x B     -Tactile cues for stance phase LE weight shift and abdominal stability  Tall Kneel Yellow Med Ball Push 15 repetitions, UE PNF Lifts 10x B     -Tactile cues for upright posture   Rationale: increase strength, improve coordination, improve balance and increase proprioception  to improve the patients ability to perform stair negotiation and functional mobility in the home/community with improved quadriceps control and decreased falls risk.             With   [] TE   [] TA   [x] neuro   [] other: Patient Education: [x] Review HEP    [] Progressed/Changed HEP based on:   [] positioning   [] body mechanics   [] transfers   [] heat/ice application    [] other:      Other Objective/Functional Measures: -Pt declined gait training on treadmill today secondary to fatigue     Pain Level (0-10 scale) post treatment: 0    ASSESSMENT/Changes in Function: Patient continue to requires moderate verbal/tactile cues for upright posture during tall kneel and core stabilization with quadruped interventions. Increased fatigue/dizziness likely 2/2 increased temperature outside and pt fatigue. Continue to develop independence with quadruped/tall kneel interventions to focus hip/core stability in safe environment. Patient will continue to benefit from skilled PT services to modify and progress therapeutic interventions, address functional mobility deficits, address ROM deficits, address strength deficits, analyze and address soft tissue restrictions, analyze and cue movement patterns, analyze and modify body mechanics/ergonomics, assess and modify postural abnormalities and address imbalance/dizziness to attain remaining goals. []  See Plan of Care  []  See progress note/recertification  []  See Discharge Summary         Progress towards goals / Updated goals:  1. Patient will demonstrate independence with updated HEP. PN: encouraged quadruped activity on days w/ inc dizziness or inc mm spasms of the LE to allow for safe movement; including 1/2 kneeling balance, tall kneeling with diagonal lift with weight ball, and bird dog  Current: progressing, continue to require moderate cues for safety (6/13/22)   2. Patient will score greater than or equal to 16/28 on Tinetti to indicate improved balance/decreased fall risk. PN: Tinetti 14/28   Current:   3. Patient will demo 6MWT of at least 500 feet with LRAD without dizziness to improve ease with community ambulation.   PN:  564 ft without ADpt with increased speed with gait; progressed to use of TM today for gym/HEP   Current:   4. Patient will perform TUG test in 20 seconds or less without AD to improve safety with household mobility.   PN:  22\" no AD; gait deviations as noted above  Current:     PLAN  [x]  Upgrade activities as tolerated     [x]  Continue plan of care  []  Update interventions per flow sheet       []  Discharge due to:_  []  Other:_      Beatriz Sims 6/13/2022  9:08 AM    Future Appointments   Date Time Provider Department [place]   6/13/2022 11:30 AM Lalo [facility]   6/20/2022  9:40 AM Savana Steele NP [facility]   6/24/2022  1:30 PM [facility]alo [facility]   6/29/2022 11:30 AM Lalo VELAZQUEZ [facility]

## 2022-06-15 ENCOUNTER — APPOINTMENT (OUTPATIENT)
Dept: PHYSICAL THERAPY | Age: 37
End: 2022-06-15
Payer: MEDICARE

## 2022-06-21 ENCOUNTER — APPOINTMENT (OUTPATIENT)
Dept: PHYSICAL THERAPY | Age: 37
End: 2022-06-21
Payer: MEDICARE

## 2022-06-24 ENCOUNTER — HOSPITAL ENCOUNTER (OUTPATIENT)
Dept: PHYSICAL THERAPY | Age: 37
Discharge: HOME | End: 2022-06-24
Payer: MEDICARE

## 2022-06-24 PROCEDURE — 97110 THERAPEUTIC EXERCISES: CPT

## 2022-06-24 PROCEDURE — 97116 GAIT TRAINING THERAPY: CPT

## 2022-06-24 PROCEDURE — 97112 NEUROMUSCULAR REEDUCATION: CPT

## 2022-06-24 NOTE — PROGRESS NOTES
PT DAILY TREATMENT NOTE     Patient Name: Nicolasa Price  Date:2022  : 1985  [x]  Patient  Verified  Payor: Sarah Shavera / Plan: [employer] / Product Type: Managed Care Medicare /    In time:1:32  Out time:2:20  Total Treatment Time (min): 45 (+3 min bathroom break)  Visit #: 2 of 4    Medicare/BCBS Only   Total Timed Codes (min):  45 1:1 Treatment Time:  40       Treatment Area: Neck pain [M54.2]  Gait instability [R26.81]  Multiple sclerosis [G35]    SUBJECTIVE  Pain Level (0-10 scale): 0  Any medication changes, allergies to medications, adverse drug reactions, diagnosis change, or new procedure performed?: [x] No    [] Yes (see summary sheet for update)  Subjective functional status/changes:   [] No changes reported  Pt reports increased dizziness/stiffness at this time. OBJECTIVE    15 min Therapeutic Exercise:  [x] See flow sheet : right HS, calf, adductor, buttock stretching   Rationale: increase ROM and increase strength to improve the patients ability to perform gait and transfers with improved LE strength and mobility. 20 min Neuromuscular Re-education:  [x]  See flow sheet :  Quadruped UE lifts 10x B,     -Minimal tactile cues for stance phase LE weight shift and abdominal stability  Glute bridge with UE Holding Yellow Med Ball, 11 repetitions, reaching with both UE to right and left to encourage activity over midline  Tall Kneel UE PNF Lifts 10x B     -Tactile cues for upright posture   Rationale: increase strength, improve coordination, improve balance and increase proprioception  to improve the patients ability to perform stair negotiation and functional mobility in the home/community with improved quadriceps control and decreased falls risk. 10 min Gait Training: Gait training on TM today with safety strap, B UE assist, and close SBA x 1.  Pt ambulates 1' at 0.7 mph for warm up and then progresses to 0.9 mph for 2 min before returning to 0.7 mph for 1.5 min. Requires stop after short duration due to fatigue. Verbal cues for increased left step length  TUG testing        Rationale: improve safety with household/community ambulation. With   [] TE   [] TA   [x] neuro   [] other: Patient Education: [x] Review HEP    [] Progressed/Changed HEP based on:   [] positioning   [] body mechanics   [] transfers   [] heat/ice application    [] other:      Other Objective/Functional Measures: -Achieved 0.9 mph during TM gait training  -TUG test 28\" without AD     Pain Level (0-10 scale) post treatment: 0    ASSESSMENT/Changes in Function: Improved core stability noted during quadruped UE lifts with patient better able to perform lateral weight shift to stance UE without need for as frequent tactile cuing. Pt's increased time during TUG test likely reflective of increased stiffness impairing ease with sit<>stand portion of test.    Patient will continue to benefit from skilled PT services to modify and progress therapeutic interventions, address functional mobility deficits, address ROM deficits, address strength deficits, analyze and address soft tissue restrictions, analyze and cue movement patterns, analyze and modify body mechanics/ergonomics, assess and modify postural abnormalities and address imbalance/dizziness to attain remaining goals. []  See Plan of Care  []  See progress note/recertification  []  See Discharge Summary         Progress towards goals / Updated goals:  1. Patient will demonstrate independence with updated HEP. PN: encouraged quadruped activity on days w/ inc dizziness or inc mm spasms of the LE to allow for safe movement; including 1/2 kneeling balance, tall kneeling with diagonal lift with weight ball, and bird dog  Current: progressing, continue to require moderate cues for safety (6/13/22)   2. Patient will score greater than or equal to 16/28 on Tinetti to indicate improved balance/decreased fall risk.   PN: Jesse 14/28   Current:   3. Patient will demo 6MWT of at least 500 feet with LRAD without dizziness to improve ease with community ambulation.   PN:  564 ft without ADpt with increased speed with gait; progressed to use of TM today for gym/HEP   Current:   4. Patient will perform TUG test in 20 seconds or less without AD to improve safety with household mobility.   PN:  22\" no AD; gait deviations as noted above  Current: not met, 28\" without AD, pt reports she is highly fatigued (6/24/22)    PLAN  [x]  Upgrade activities as tolerated     [x]  Continue plan of care  []  Update interventions per flow sheet       []  Discharge due to:_  []  Other:_      Prosper Driver 6/24/2022  7:12 AM    Future Appointments   Date Time Provider Gabriella Benavidez   6/24/2022  1:30 PM Sherice Rudolph [facility]   6/29/2022 11:30 [facility]   8/26/2022 10:00 AM [address] [facility]

## 2022-06-27 ENCOUNTER — APPOINTMENT (OUTPATIENT)
Dept: PHYSICAL THERAPY | Age: 37
End: 2022-06-27
Payer: MEDICARE

## 2022-06-29 ENCOUNTER — HOSPITAL ENCOUNTER (OUTPATIENT)
Dept: PHYSICAL THERAPY | Age: 37
Discharge: HOME | End: 2022-06-29
Payer: MEDICARE

## 2022-06-29 PROCEDURE — 97112 NEUROMUSCULAR REEDUCATION: CPT

## 2022-06-29 PROCEDURE — 97530 THERAPEUTIC ACTIVITIES: CPT

## 2022-06-29 NOTE — PROGRESS NOTES
PT DAILY TREATMENT NOTE     Patient Name: Atif Frazier  Date:2022  : 1985  [x]  Patient  Verified  Payor: Shelly Huff / Plan: [employer] [place] / Product Type: Managed Care Medicare /    In time:11:35  Out time:12:22  Total Treatment Time (min): 52  Visit #: 3 of 4    Medicare/BCBS Only   Total Timed Codes (min):  47 1:1 Treatment Time:  47       Treatment Area: Neck pain [M54.2]  Gait instability [R26.81]  Multiple sclerosis [G35]    SUBJECTIVE  Pain Level (0-10 scale): 0  Any medication changes, allergies to medications, adverse drug reactions, diagnosis change, or new procedure performed?: [x] No    [] Yes (see summary sheet for update)  Subjective functional status/changes:   [] No changes reported  \"I am feeling more tired today, I have been feeling a bit fatigued ever since I woke up today. \"    OBJECTIVE    35 min Therapeutic Activity:  [x]  See flow sheet :   Rationale: increase strength, improve coordination, improve balance, and increase proprioception  to improve the patients ability to perform transfers, stair negotiation, and floor <> waist lifts. Patient education: discharge planning, reassessment of balance/gait measures, review of HEP, explanation of improvement and continued limitations, activity pacing with MS (especially with extra fatigue and warmer temperatures)     12 min Neuromuscular Re-education:  [x]  See flow sheet :   Rationale: increase strength, improve coordination, improve balance and increase proprioception  to improve the patients ability to perform stair negotiation and functional mobility in the home/community with improved quadriceps control and decreased falls risk.             With   [] TE   [] TA   [] neuro   [] other: Patient Education: [x] Review HEP    [] Progressed/Changed HEP based on:   [] positioning   [] body mechanics   [] transfers   [] heat/ice application    [] other:      Other Objective/Functional Measures:     Gait: 6MWT = 400' without AD  TUG Test=  22\" without AD  Functional Squat: able to complete sit<>stand without AD, without UE assist  Stair Negotiation: ascends with right LE leading, descends with right LE leading  Balance:   mCTSIB  EO Firm 30\" normal sway  EC Firm 30\" normal sway   EO Foam: 30\" normal sway  EC Foam: 10\"    Tinetti Test- 19/28 pts     Pain Level (0-10 scale) post treatment: 0    ASSESSMENT/Changes in Function: Pt attended therapy consistently for 22 visits for the treatment of impaired gait/mobility secondary to MS. At this point, the patient reports 50% improvement since [facility] with specific improvements in the following areas: improved gait quality (increased stride length, increased toe clearance), slightly improved activity tolerance, and somewhat improved standing balance. He reports some decreased dizziness at this time due to medication changes in the past 2 weeks after neurology appt. He continues to demo ataxic gait with increased base of support but does demonstrate improved right step length and improved speed with gait, although gait/standing endurance remains limited based on fatigue levels. The patient is appropriate for discharge at this time with a comprehensive HEP and will follow up with our office about any questions that arise following discharge. Thank you for this referral.      Patient will continue to benefit from skilled PT services to modify and progress therapeutic interventions, address functional mobility deficits, address ROM deficits, address strength deficits, analyze and address soft tissue restrictions, analyze and cue movement patterns, analyze and modify body mechanics/ergonomics, assess and modify postural abnormalities and address imbalance/dizziness to attain remaining goals. []  See Plan of Care  []  See progress note/recertification  []  See Discharge Summary         Progress towards goals / Updated goals:  1.  Patient will demonstrate independence with updated HEP. PN: encouraged quadruped activity on days w/ inc dizziness or inc mm spasms of the LE to allow for safe movement; including 1/2 kneeling balance, tall kneeling with diagonal lift with weight ball, and bird dog  Current: progressing, continue to require moderate cues for safety (6/29/22)   2. Patient will score greater than or equal to 16/28 on Tinetti to indicate improved balance/decreased fall risk. PN: Tinetti 14/28   Current: met, 19/28 pts (6/29/22)  3. Patient will demo 6MWT of at least 500 feet with LRAD without dizziness to improve ease with community ambulation.   PN:  564 ft without ADpt with increased speed with gait; progressed to use of TM today for gym/HEP   Current: not met, 400 feet without AD, severely limited by fatigue and increased temperature outside (6/29/22)  4. Patient will perform TUG test in 20 seconds or less without AD to improve safety with household mobility.   PN:  22\" no AD; gait deviations as noted above  Current: not met, 22\" without AD, pt reports he is highly fatigued (6/29/22)    PLAN  []  Upgrade activities as tolerated     []  Continue plan of care  []  Update interventions per flow sheet       [x]  Discharge due to: lack of progress  []  Other:_      Gaudencio Gonzalez 6/29/2022  9:52 AM    Future Appointments   Date Time Provider Gabriella Benavidez   6/29/2022 11:30 AM Anderson Santiago [facility]   8/26/2022 10:00 AM [employer]

## 2022-06-29 NOTE — PROGRESS NOTES
[facility] AT [address]. [place], [place] [address]  Phone: [phone] Fax [phone] SUMMARY  Patient Name: Romero Raphael : 1985   Treatment/Medical Diagnosis: Neck pain [M54.2]  Gait instability [R26.81]  Multiple sclerosis [G35]   Referral Source: Theresa Bee NP     Date of Initial Visit: 3/1/22 Attended Visits: 22 Missed Visits: 0     SUMMARY OF TREATMENT  Patient is a 43 y. o. male who presents with complaints of neck pain, back pain, right LE pain, dizziness/vertigo, imbalance and difficulty with functional mobility/gait.  Treatment has consisted of: therapeutic exercise to improve LE/lumbar strength/mobility; therapeutic activities to improve transfer/lift/carry ability; neuromuscular re-education to improve balance, core stability, neuromuscular control with lifting; PNF patterns; physical agent/modality for symptom management; manual therapy for symptom relief and muscle flexibility; patient education to improve symptom management; self Care training; home safety training; stair training, and functional mobility training. CURRENT STATUS  Pt attended therapy consistently for 22 visits for the treatment of impaired gait/mobility secondary to MS. At this point, the patient reports 50% improvement since [facility] with specific improvements in the following areas: improved gait quality (increased stride length, increased toe clearance), slightly improved activity tolerance, and somewhat improved standing balance. He reports some decreased dizziness at this time due to medication changes in the past 2 weeks after neurology appt. He continues to demo ataxic gait with increased base of support but does demonstrate improved right step length and improved speed with gait, although gait/standing endurance remains limited based on fatigue levels.  The patient is appropriate for discharge at this time with a comprehensive HEP and will follow up with our office about any questions that arise following discharge. Thank you for this referral.    Gait: 6MWT = 400' without AD  TUG Test=  22\" without AD  Functional Squat: able to complete sit<>stand without AD, without UE assist  Stair Negotiation: ascends with right LE leading, descends with right LE leading  Balance:   mCTSIB  EO Firm 30\" normal sway  EC Firm 30\" normal sway   EO Foam: 30\" normal sway  EC Foam: 10\"    Tinetti Test- 19/28 pts    1. Patient will demonstrate independence with updated HEP. PN: encouraged quadruped activity on days w/ inc dizziness or inc mm spasms of the LE to allow for safe movement; including 1/2 kneeling balance, tall kneeling with diagonal lift with weight ball, and bird dog  Current: progressing, continue to require moderate cues for safety (6/29/22)   2. Patient will score greater than or equal to 16/28 on Tinetti to indicate improved balance/decreased fall risk. PN: Tinetti 14/28   Current: met, 19/28 pts (6/29/22)  3. Patient will demo 6MWT of at least 500 feet with LRAD without dizziness to improve ease with community ambulation.   PN:  564 ft without ADpt with increased speed with gait; progressed to use of TM today for gym/HEP   Current: not met, 400 feet without AD, severely limited by fatigue and increased temperature outside (6/29/22)  4. Patient will perform TUG test in 20 seconds or less without AD to improve safety with household mobility. PN:  22\" no AD; gait deviations as noted above  Current: not met, 22\" without AD, pt reports he is highly fatigued (6/29/22)    RECOMMENDATIONS  Discontinue therapy due to lack of appreciable progress towards goals. Pt has reached functional plateau. If you have any questions/comments please contact us directly at [phone]. Thank you for allowing us to assist in the care of your patient.   Therapist Signature: Parish Rangel Date: 6/29/22   Reporting Period: 6/8/22-6/29/22 Time: 5:26 PM

## 2022-06-29 NOTE — PROGRESS NOTES
Physical Therapy Discharge Instructions    [facility] - [address] [facility]  [phone] fax    Patient: Jaime Pan  : 1985    Continue Home Exercise Program 2-3 times per week. Continue with    [x] Ice  as needed to avoid increased muscle tone in right leg    [x] Heat           Follow up with MD:     [] Upon completion of therapy     [x] As needed    Recommendations:     [x]   Return to activity with home program    []   Return to activity with the following modifications:       []Post Rehab Program    []Join Independent aquatic program     []Return to/join local gym    Additional Comments: Low, it has been a pleasure working with you. Keep up the good work with your balance, stretching, and development exercises and please reach out in the future if you have any questions. Go Lalitha! Reed Hess PT, DPT 2022 12:15 PM

## 2023-04-26 ENCOUNTER — OFFICE VISIT (OUTPATIENT)
Facility: CLINIC | Age: 38
End: 2023-04-26
Payer: MEDICARE

## 2023-04-26 VITALS
RESPIRATION RATE: 20 BRPM | SYSTOLIC BLOOD PRESSURE: 143 MMHG | TEMPERATURE: 98.1 F | OXYGEN SATURATION: 97 % | WEIGHT: 197 LBS | HEIGHT: 70 IN | BODY MASS INDEX: 28.2 KG/M2 | HEART RATE: 70 BPM | DIASTOLIC BLOOD PRESSURE: 81 MMHG

## 2023-04-26 DIAGNOSIS — Z13.6 SCREENING, ISCHEMIC HEART DISEASE: ICD-10-CM

## 2023-04-26 DIAGNOSIS — R79.89 ELEVATED LFTS: ICD-10-CM

## 2023-04-26 DIAGNOSIS — G35 MULTIPLE SCLEROSIS, RELAPSING-REMITTING (HCC): ICD-10-CM

## 2023-04-26 DIAGNOSIS — Z13.1 SCREENING FOR DIABETES MELLITUS (DM): ICD-10-CM

## 2023-04-26 DIAGNOSIS — Z11.59 ENCOUNTER FOR HEPATITIS C SCREENING TEST FOR LOW RISK PATIENT: ICD-10-CM

## 2023-04-26 DIAGNOSIS — Z76.89 ESTABLISHING CARE WITH NEW DOCTOR, ENCOUNTER FOR: ICD-10-CM

## 2023-04-26 DIAGNOSIS — K40.90 HERNIA OF SCROTUM: Primary | ICD-10-CM

## 2023-04-26 PROCEDURE — 99204 OFFICE O/P NEW MOD 45 MIN: CPT | Performed by: STUDENT IN AN ORGANIZED HEALTH CARE EDUCATION/TRAINING PROGRAM

## 2023-04-26 RX ORDER — GLUCOSAMINE/CHONDR SU A SOD 750-600 MG
120 TABLET ORAL DAILY
COMMUNITY

## 2023-04-26 RX ORDER — GABAPENTIN 300 MG/1
CAPSULE ORAL
COMMUNITY
Start: 2023-04-13

## 2023-04-26 SDOH — ECONOMIC STABILITY: INCOME INSECURITY: HOW HARD IS IT FOR YOU TO PAY FOR THE VERY BASICS LIKE FOOD, HOUSING, MEDICAL CARE, AND HEATING?: SOMEWHAT HARD

## 2023-04-26 SDOH — ECONOMIC STABILITY: FOOD INSECURITY: WITHIN THE PAST 12 MONTHS, YOU WORRIED THAT YOUR FOOD WOULD RUN OUT BEFORE YOU GOT MONEY TO BUY MORE.: NEVER TRUE

## 2023-04-26 SDOH — ECONOMIC STABILITY: HOUSING INSECURITY
IN THE LAST 12 MONTHS, WAS THERE A TIME WHEN YOU DID NOT HAVE A STEADY PLACE TO SLEEP OR SLEPT IN A SHELTER (INCLUDING NOW)?: NO

## 2023-04-26 SDOH — ECONOMIC STABILITY: FOOD INSECURITY: WITHIN THE PAST 12 MONTHS, THE FOOD YOU BOUGHT JUST DIDN'T LAST AND YOU DIDN'T HAVE MONEY TO GET MORE.: NEVER TRUE

## 2023-04-26 ASSESSMENT — ENCOUNTER SYMPTOMS
SHORTNESS OF BREATH: 0
ABDOMINAL PAIN: 0

## 2023-04-26 ASSESSMENT — PATIENT HEALTH QUESTIONNAIRE - PHQ9
SUM OF ALL RESPONSES TO PHQ QUESTIONS 1-9: 0
SUM OF ALL RESPONSES TO PHQ9 QUESTIONS 1 & 2: 0
SUM OF ALL RESPONSES TO PHQ QUESTIONS 1-9: 0
2. FEELING DOWN, DEPRESSED OR HOPELESS: 0
1. LITTLE INTEREST OR PLEASURE IN DOING THINGS: 0

## 2023-04-26 NOTE — PROGRESS NOTES
HISTORY OF PRESENT ILLNESS  Nleia Dash is a 42 y.o. male presenting today for   Chief Complaint   [facility]    [facility]    Hernia     scrotum      Presents with his wife today who is helping with medical history    MS- Dx in 2019. Relapsing/Remitting. Currently managed by neurology at [facility]. Currently taking Gabapentin and Ocrevus twice a yr. Hernia- Noticed a few months ago. He normally works out and thinks its a result of this. Pain is intermittent. Currently in a lot of pain. Past Medical History:   Diagnosis Date    Erectile dysfunction     Multiple sclerosis (HCC)     Neurogenic bladder     Nocturia     Urinary frequency        Review of Systems   Eyes:  Negative for visual disturbance. Respiratory:  Negative for shortness of breath. Cardiovascular:  Negative for chest pain. Gastrointestinal:  Negative for abdominal pain. Genitourinary:  Positive for scrotal swelling. Neurological:  Negative for headaches. BP (!) 143/81   Pulse 70   Temp 98.1 °F (36.7 °C) (Skin)   Resp 20   Ht 5' 10\" (1.778 m)   Wt 197 lb (89.4 kg)   SpO2 97%   BMI 28.27 kg/m²     Physical Exam  Vitals reviewed. Exam conducted with a chaperone present. Constitutional:       Appearance: Normal appearance. HENT:      Mouth/Throat:      Comments: MASK  Cardiovascular:      Rate and Rhythm: Normal rate and regular rhythm. Pulses: Normal pulses. Pulmonary:      Effort: Pulmonary effort is normal.      Breath sounds: Normal breath sounds. Abdominal:      Hernia: A hernia is present. Hernia is present in the right inguinal area. Genitourinary:     Testes:         Right: Tenderness present. Lymphadenopathy:      Lower Body: No right inguinal adenopathy. Psychiatric:         Mood and Affect: Mood normal.       ASSESSMENT and PLAN    ICD-10-CM    1. Hernia of scrotum  K40.90 [address] - Lovell Paget, MD, General Surgery, [facility] [place], [place]      2.  Elevated LFTs

## 2023-04-26 NOTE — PROGRESS NOTES
Nelia Dash is a 42 y.o. male (: 1985) presenting to address:    Chief Complaint   Patient presents with    Establish Care    New Patient    Hernia     scrotum       BP (!) 143/81   Pulse 70   Temp 98.1 °F (36.7 °C) (Skin)   Resp 20   Ht 5' 10\" (1.778 m)   Wt 197 lb (89.4 kg)   SpO2 97%   BMI 28.27 kg/m²    No flowsheet data found. Hearing/Vision:   No results found. Learning Assessment:   No flowsheet data found. Depression Screening:   No flowsheet data found. Fall Risk Assessment:   No flowsheet data found. Abuse Screening:   No flowsheet data found. Coordination of Care Questionaire:     Advanced Directive:   1. Do you have an Advanced Directive? No    2. Would you like information on Advanced Directives? No    1. \"Have you been to the ER, urgent care clinic since your last visit? Hospitalized since your last visit? \" No    2. \"Have you seen or consulted any other health care providers outside of the [address] [place] [address] since your last visit? \" No    3. For patients aged 39-70: Has the patient had a colonoscopy? No    If the patient is female:    4. For patients aged 41-77: Has the patient had a mammogram within the past 2 years? No    5. For patients aged 21-65: Has the patient had a pap smear?  No

## 2023-06-05 PROBLEM — G60.9 HEREDITARY PERIPHERAL NEUROPATHY: Status: ACTIVE | Noted: 2023-06-05

## 2023-06-05 PROBLEM — R26.89 LOSS OF BALANCE: Status: ACTIVE | Noted: 2023-06-05

## 2023-06-29 ENCOUNTER — OFFICE VISIT (OUTPATIENT)
Facility: CLINIC | Age: 38
End: 2023-06-29
Payer: MEDICARE

## 2023-06-29 VITALS
HEIGHT: 70 IN | RESPIRATION RATE: 20 BRPM | WEIGHT: 190 LBS | HEART RATE: 68 BPM | BODY MASS INDEX: 27.2 KG/M2 | DIASTOLIC BLOOD PRESSURE: 78 MMHG | SYSTOLIC BLOOD PRESSURE: 138 MMHG | OXYGEN SATURATION: 97 %

## 2023-06-29 DIAGNOSIS — R79.89 ELEVATED LFTS: Primary | ICD-10-CM

## 2023-06-29 DIAGNOSIS — G35 MULTIPLE SCLEROSIS, RELAPSING-REMITTING (HCC): ICD-10-CM

## 2023-06-29 LAB
A/G RATIO: 2.1 RATIO (ref 1.1–2.6)
ALBUMIN SERPL-MCNC: 4.8 G/DL (ref 3.5–5)
ALP BLD-CCNC: 72 U/L (ref 25–115)
ALT SERPL-CCNC: 49 U/L (ref 5–40)
AST SERPL-CCNC: 21 U/L (ref 10–37)
AVERAGE GLUCOSE: 91 MG/DL (ref 91–123)
BILIRUB SERPL-MCNC: 0.7 MG/DL (ref 0.2–1.2)
BILIRUBIN DIRECT: <0.2 MG/DL (ref 0–0.3)
CHOLESTEROL/HDL RATIO: 6.8 (ref 0–5)
CHOLESTEROL: 271 MG/DL (ref 110–200)
GLOBULIN: 2.3 G/DL (ref 2–4)
HBA1C MFR BLD: 4.8 % (ref 4.8–5.6)
HDLC SERPL-MCNC: 40 MG/DL
HEPATITIS C ANTIBODY: NORMAL
LDL CHOLESTEROL CALCULATED: 203 MG/DL (ref 50–99)
LDL/HDL RATIO: 5.1
NON-HDL CHOLESTEROL: 231 MG/DL
TOTAL PROTEIN: 7.1 G/DL (ref 6.4–8.3)
TRIGL SERPL-MCNC: 139 MG/DL (ref 40–149)
VLDLC SERPL CALC-MCNC: 28 MG/DL (ref 8–30)

## 2023-06-29 PROCEDURE — 99213 OFFICE O/P EST LOW 20 MIN: CPT | Performed by: STUDENT IN AN ORGANIZED HEALTH CARE EDUCATION/TRAINING PROGRAM

## 2023-06-29 RX ORDER — OXYCODONE HYDROCHLORIDE 5 MG/1
TABLET ORAL
COMMUNITY
Start: 2023-06-14

## 2023-06-29 RX ORDER — FESOTERODINE FUMARATE 4 MG/1
TABLET, EXTENDED RELEASE ORAL DAILY
COMMUNITY

## 2023-06-29 ASSESSMENT — PATIENT HEALTH QUESTIONNAIRE - PHQ9
1. LITTLE INTEREST OR PLEASURE IN DOING THINGS: 0
2. FEELING DOWN, DEPRESSED OR HOPELESS: 0
SUM OF ALL RESPONSES TO PHQ9 QUESTIONS 1 & 2: 0
SUM OF ALL RESPONSES TO PHQ QUESTIONS 1-9: 0

## 2023-06-29 ASSESSMENT — ENCOUNTER SYMPTOMS
SHORTNESS OF BREATH: 0
ABDOMINAL PAIN: 0

## 2024-01-04 ENCOUNTER — TELEPHONE (OUTPATIENT)
Facility: CLINIC | Age: 39
End: 2024-01-04

## 2024-01-04 NOTE — TELEPHONE ENCOUNTER
----- Message from Josie Torres sent at 1/3/2024 12:14 PM EST -----  Subject: Message to Provider    QUESTIONS  Information for Provider? Moraima woke up not feeling well wife Joan is   wanting to know if he needs to take a Covid Test to see if he has Covid   And she needs to know how long the test are good for. Please advise pt  ---------------------------------------------------------------------------  --------------  CALL BACK INFO  [phone]; OK to leave message on voicemail  ---------------------------------------------------------------------------  --------------  SCRIPT ANSWERS  Relationship to Patient? Spouse/Partner  Representative Name? [employer]  Is the representative on the Communication Release of Information ([place])   form in Epic? Yes

## 2024-03-06 ENCOUNTER — OFFICE VISIT (OUTPATIENT)
Facility: CLINIC | Age: 39
End: 2024-03-06
Payer: MEDICARE

## 2024-03-06 VITALS
RESPIRATION RATE: 20 BRPM | BODY MASS INDEX: 26.48 KG/M2 | OXYGEN SATURATION: 94 % | HEART RATE: 83 BPM | SYSTOLIC BLOOD PRESSURE: 118 MMHG | HEIGHT: 70 IN | TEMPERATURE: 98.2 F | DIASTOLIC BLOOD PRESSURE: 74 MMHG | WEIGHT: 185 LBS

## 2024-03-06 DIAGNOSIS — E78.2 MIXED HYPERLIPIDEMIA: ICD-10-CM

## 2024-03-06 DIAGNOSIS — G35 MULTIPLE SCLEROSIS, RELAPSING-REMITTING (HCC): ICD-10-CM

## 2024-03-06 DIAGNOSIS — Z00.00 ROUTINE GENERAL MEDICAL EXAMINATION AT A HEALTH CARE FACILITY: Primary | ICD-10-CM

## 2024-03-06 DIAGNOSIS — Z13.31 DEPRESSION SCREENING NEGATIVE: ICD-10-CM

## 2024-03-06 PROCEDURE — 99396 PREV VISIT EST AGE 40-64: CPT | Performed by: STUDENT IN AN ORGANIZED HEALTH CARE EDUCATION/TRAINING PROGRAM

## 2024-03-06 PROCEDURE — 96127 BRIEF EMOTIONAL/BEHAV ASSMT: CPT | Performed by: STUDENT IN AN ORGANIZED HEALTH CARE EDUCATION/TRAINING PROGRAM

## 2024-03-06 ASSESSMENT — PATIENT HEALTH QUESTIONNAIRE - PHQ9
2. FEELING DOWN, DEPRESSED OR HOPELESS: 0
SUM OF ALL RESPONSES TO PHQ QUESTIONS 1-9: 0
SUM OF ALL RESPONSES TO PHQ QUESTIONS 1-9: 0
1. LITTLE INTEREST OR PLEASURE IN DOING THINGS: 0
SUM OF ALL RESPONSES TO PHQ QUESTIONS 1-9: 0
SUM OF ALL RESPONSES TO PHQ QUESTIONS 1-9: 0
SUM OF ALL RESPONSES TO PHQ9 QUESTIONS 1 & 2: 0

## 2024-03-06 ASSESSMENT — ENCOUNTER SYMPTOMS: SHORTNESS OF BREATH: 0

## 2024-03-06 NOTE — PROGRESS NOTES
\"Have you been to the ER, urgent care clinic since your last visit?  Hospitalized since your last visit?\"    NO    “Have you seen or consulted any other health care providers outside of [facility] since your last visit?”    NO           
screening negative  Z13.31       Annual Wellness Exam  -Counseled on implementing healthy lifestyle routines. Cutting back on refined sugars and grains and substituting for whole grains, eating fresh fruits and vegetables, nuts and lean protein.   -Important to get 150 min of exercise a week.  -Discussed the following vaccinations based on CDC recommendations: TDAP, COVID, Influenza  -Discussed recommended routine screenings for STIs  -PHQ-9 Total Score: 0 (3/6/2024  7:42 AM)    HLD-Chronic  -Currently diet controlled.   -Encouraged to continue implementing healthy diet and 150min of weekly physical activity.  -Ordered lipids today    Multiple sclerosis-Chronic  -Reviewed recent notes, labs and imaging  -Defer current treatment to neurology team         Personalized Preventive Plan   Current Health Maintenance Status  Immunization History   Administered Date(s) Administered    COVID-19, PFIZER, (2023-24 formula), (age 12y+), IM, 30mcg/0.3mL 10/14/2023        Health Maintenance   Topic Date Due    Hepatitis B vaccine (1 of 3 - 3-dose series) Never done    Varicella vaccine (1 of 2 - 2-dose childhood series) Never done    DTaP/Tdap/Td vaccine (1 - Tdap) Never done    Annual Wellness Visit (Medicare Advantage)  Never done    Depression Screen  06/29/2024    Diabetes screen  06/29/2026    Lipids  06/29/2028    Flu vaccine  Completed    COVID-19 Vaccine  Completed    Hepatitis C screen  Completed    HIV screen  Completed    Hepatitis A vaccine  Aged Out    Hib vaccine  Aged Out    HPV vaccine  Aged Out    Polio vaccine  Aged Out    Meningococcal (ACWY) vaccine  Aged Out    Pneumococcal 0-64 years Vaccine  Aged Out       Recommendations for Preventive Services Due: see orders and patient instructions/AVS.    No follow-ups on file.

## 2024-03-08 LAB
A/G RATIO: 1.4 RATIO (ref 1.1–2.6)
ALBUMIN SERPL-MCNC: 4 G/DL (ref 3.5–5)
ALP BLD-CCNC: 72 U/L (ref 25–115)
ANION GAP SERPL CALCULATED.3IONS-SCNC: 12 MMOL/L (ref 3–15)
AST SERPL-CCNC: 14 U/L (ref 10–37)
BUN BLDV-MCNC: 14 MG/DL (ref 6–22)
CALCIUM SERPL-MCNC: 9.3 MG/DL (ref 8.4–10.5)
CHLORIDE BLD-SCNC: 103 MMOL/L (ref 98–110)
CHOLESTEROL/HDL RATIO: 6.5 (ref 0–5)
CHOLESTEROL: 268 MG/DL (ref 110–200)
CO2: 27 MMOL/L (ref 20–32)
CREAT SERPL-MCNC: 0.9 MG/DL (ref 0.5–1.2)
GLOBULIN: 2.8 G/DL (ref 2–4)
GLUCOSE: 79 MG/DL (ref 70–99)
HBA1C MFR BLD: 4.8 % (ref 4.8–5.6)
LDL CHOLESTEROL CALCULATED: 192 MG/DL (ref 50–99)
LDL/HDL RATIO: 4.7
NON-HDL CHOLESTEROL: 227 MG/DL
POTASSIUM SERPL-SCNC: 4.1 MMOL/L (ref 3.5–5.5)
TOTAL PROTEIN: 6.8 G/DL (ref 6.4–8.3)
TRIGL SERPL-MCNC: 172 MG/DL (ref 40–149)
TSH SERPL DL<=0.05 MIU/L-ACNC: 0.76 MCU/ML (ref 0.27–4.2)
VLDLC SERPL CALC-MCNC: 34 MG/DL (ref 8–30)

## 2024-03-08 RX ORDER — ATORVASTATIN CALCIUM 20 MG/1
20 TABLET, FILM COATED ORAL DAILY
Qty: 90 TABLET | Refills: 2 | Status: SHIPPED | OUTPATIENT
Start: 2024-03-08

## 2024-03-08 NOTE — RESULT ENCOUNTER NOTE
Overall your blood work looks good. Your cholesterol is moderately elevated and your calculated risk score for heart attack and stroke places you at increased risk. It is strongly suggested that we start a medication called a statin to prevent this from occurring. I will start a medication called Atorvastatin 20mg daily. We will then recheck your numbers in 6mo to make sure they are responding to the medication.

## 2024-12-06 DIAGNOSIS — E78.2 MIXED HYPERLIPIDEMIA: ICD-10-CM

## 2024-12-06 NOTE — TELEPHONE ENCOUNTER
Requested Prescriptions     Pending Prescriptions Disp Refills    atorvastatin (LIPITOR) 20 MG tablet [Pharmacy Med Name: ATORVASTATIN 20 MG TABLET] 90 tablet 2     Sig: take 1 tablet by mouth daily     Chart reviewed. No appointment scheduled for return in about 6 months (around 9/6/2024) for Medicare wellness.    Called pt and left message. Call back number left and I myself or one of the other nurses will attempt to contact again.    The call was to inform pt a follow up appt is encouraged.    PCP notified.

## 2024-12-09 RX ORDER — ATORVASTATIN CALCIUM 20 MG/1
20 TABLET, FILM COATED ORAL DAILY
Qty: 90 TABLET | Refills: 2 | Status: SHIPPED | OUTPATIENT
Start: 2024-12-09